# Patient Record
Sex: FEMALE | Race: BLACK OR AFRICAN AMERICAN | NOT HISPANIC OR LATINO | Employment: OTHER | URBAN - METROPOLITAN AREA
[De-identification: names, ages, dates, MRNs, and addresses within clinical notes are randomized per-mention and may not be internally consistent; named-entity substitution may affect disease eponyms.]

---

## 2017-01-13 ENCOUNTER — PATIENT OUTREACH (OUTPATIENT)
Dept: OTHER | Facility: OTHER | Age: 57
End: 2017-01-13
Payer: MEDICARE

## 2017-01-13 NOTE — PROGRESS NOTES
Last 5 Patient Entered Readings                                                               Current 30 Day Average: 120/69     Recent Readings 1/3/2017 1/3/2017 12/26/2016 12/26/2016 12/12/2016    Systolic BP (mmHg) 129 129 112 112 118    Diastolic BP (mmHg) 68 68 71 71 68    Pulse - 72 - 70 -          Follow up with Ms. Omid Rascon completed. Patient is maintaining a low sodium diet and continuing her exercise regime. She reports that overall, she is doing well and feeling ok. She is having a biopsy done on Feb 1 for a mass that is in her head and because of that, her MD took her off of the Amlodipine for the time being (her readings shot up in the office at her last visit so they took her off the meds and since then she has been fine). She is also having labs done and then seeing her kidney MD on Feb 9 to have a check up. She is in [place] right now and having trouble getting her cuff to connect to her phone (she has a cuff that stays in [place] and one in [place] because she lives in both cities) so I suggested that she change the batteries in her cuff because her bluetooth is on but informed me that it was taking   a while for her cuff to even turn on. She is going to try that but if it still does not work, then she will just have to wait until she gets home and take her readings on the cuff in [place]. Will continue to monitor. Patient did not have any further questions or concerns. I will follow up in a few weeks to see how she is doing and progressing.

## 2017-01-17 ENCOUNTER — PATIENT MESSAGE (OUTPATIENT)
Dept: FAMILY MEDICINE | Facility: CLINIC | Age: 57
End: 2017-01-17

## 2017-02-02 ENCOUNTER — PATIENT MESSAGE (OUTPATIENT)
Dept: NEPHROLOGY | Facility: CLINIC | Age: 57
End: 2017-02-02

## 2017-02-03 DIAGNOSIS — N18.2 CHRONIC KIDNEY DISEASE, STAGE II (MILD): Primary | ICD-10-CM

## 2017-02-13 ENCOUNTER — PATIENT OUTREACH (OUTPATIENT)
Dept: OTHER | Facility: OTHER | Age: 57
End: 2017-02-13
Payer: MEDICARE

## 2017-02-15 ENCOUNTER — PATIENT OUTREACH (OUTPATIENT)
Dept: OTHER | Facility: OTHER | Age: 57
End: 2017-02-15
Payer: MEDICARE

## 2017-02-15 NOTE — PROGRESS NOTES
Last 5 Patient Entered Readings                                                               Current 30 Day Average: 116/71     Recent Readings 2/8/2017 2/8/2017 2/8/2017 2/8/2017 2/8/2017    Systolic BP (mmHg) 120 120 112 114 121    Diastolic BP (mmHg) 78 78 77 71 73    Pulse 68 - 71 70 -        LVM to follow up with Ms. Omid Meeksgarcía Rascon. Inquired about how her low sodium diet and exercise is going. Overall, her readings are looking good and she is back taking her readings weekly again. Advised pt to call or message back if she has any questions or concerns.

## 2017-02-16 DIAGNOSIS — D47.2 MGUS (MONOCLONAL GAMMOPATHY OF UNKNOWN SIGNIFICANCE): Primary | ICD-10-CM

## 2017-02-17 NOTE — PROGRESS NOTES
Ms. Omid Rascon is doing well. She hasn't taken amlodipine since December. She is using herbal supplements instead (garlic and eggplant).    Last 5 Patient Entered Readings                                                               Current 30 Day Average: 115/70     Recent Readings 2/15/2017 2/15/2017 2/8/2017 2/8/2017 2/8/2017    Systolic BP (mmHg) 113 113 120 120 112    Diastolic BP (mmHg) 70 70 78 78 77    Pulse - 69 68 - 71      BP at goal  Continue monitoring

## 2017-02-23 ENCOUNTER — TELEPHONE (OUTPATIENT)
Dept: NEUROSURGERY | Facility: CLINIC | Age: 57
End: 2017-02-23

## 2017-02-23 NOTE — TELEPHONE ENCOUNTER
I contacted the pt at both listed numbers and LM asking the pt to give our office a call regarding her scheduled appt with . Should the pt call back this is a [facility] pt and will need to f/u with her or a PA

## 2017-02-24 ENCOUNTER — TELEPHONE (OUTPATIENT)
Dept: NEUROSURGERY | Facility: CLINIC | Age: 57
End: 2017-02-24

## 2017-02-24 NOTE — TELEPHONE ENCOUNTER
Advised patient appointment with Dr. Alan on Monday 2/27/17 cancelled as she is a previous patient of Dr. Keith's and needs to follow-up with her, not Dr. Alan. Appointment with  scheduled Thursday 3/9/17 at 3:15pm. Appointment slip mailed to address on file. Understanding verbalized.

## 2017-02-24 NOTE — TELEPHONE ENCOUNTER
----- Message from Mk Roche sent at 2/24/2017  8:42 AM CST -----  Contact: PT  Returning [place] call, [phone]

## 2017-02-27 ENCOUNTER — OFFICE VISIT (OUTPATIENT)
Dept: HEMATOLOGY/ONCOLOGY | Facility: CLINIC | Age: 57
End: 2017-02-27
Payer: MEDICARE

## 2017-02-27 ENCOUNTER — LAB VISIT (OUTPATIENT)
Dept: LAB | Facility: HOSPITAL | Age: 57
End: 2017-02-27
Attending: INTERNAL MEDICINE
Payer: MEDICARE

## 2017-02-27 ENCOUNTER — PATIENT MESSAGE (OUTPATIENT)
Dept: NEPHROLOGY | Facility: CLINIC | Age: 57
End: 2017-02-27

## 2017-02-27 DIAGNOSIS — N28.1 RENAL CYST: ICD-10-CM

## 2017-02-27 DIAGNOSIS — I10 ESSENTIAL HYPERTENSION: ICD-10-CM

## 2017-02-27 DIAGNOSIS — D47.2 MGUS (MONOCLONAL GAMMOPATHY OF UNKNOWN SIGNIFICANCE): Primary | ICD-10-CM

## 2017-02-27 DIAGNOSIS — N18.2 CKD (CHRONIC KIDNEY DISEASE), STAGE II: ICD-10-CM

## 2017-02-27 DIAGNOSIS — D47.2 MONOCLONAL GAMMOPATHY: ICD-10-CM

## 2017-02-27 LAB
ALBUMIN SERPL BCP-MCNC: 3.9 G/DL
ANION GAP SERPL CALC-SCNC: 9 MMOL/L
BUN SERPL-MCNC: 21 MG/DL
CALCIUM SERPL-MCNC: 9 MG/DL
CHLORIDE SERPL-SCNC: 106 MMOL/L
CO2 SERPL-SCNC: 25 MMOL/L
CREAT SERPL-MCNC: 1.1 MG/DL
EST. GFR  (AFRICAN AMERICAN): >60 ML/MIN/1.73 M^2
EST. GFR  (NON AFRICAN AMERICAN): 55.9 ML/MIN/1.73 M^2
GLUCOSE SERPL-MCNC: 93 MG/DL
PHOSPHATE SERPL-MCNC: 4 MG/DL
POTASSIUM SERPL-SCNC: 3.6 MMOL/L
SODIUM SERPL-SCNC: 140 MMOL/L

## 2017-02-27 PROCEDURE — 99499 UNLISTED E&M SERVICE: CPT | Mod: S$GLB,,, | Performed by: INTERNAL MEDICINE

## 2017-02-27 NOTE — PROGRESS NOTES
Patient not seen due incomplete requested labs and need to reschedule appt.  Appointment rescheduled for 3/6/17.

## 2017-03-09 ENCOUNTER — OFFICE VISIT (OUTPATIENT)
Dept: HEMATOLOGY/ONCOLOGY | Facility: CLINIC | Age: 57
End: 2017-03-09
Payer: MEDICARE

## 2017-03-09 VITALS
WEIGHT: 167.56 LBS | BODY MASS INDEX: 27.88 KG/M2 | OXYGEN SATURATION: 100 % | SYSTOLIC BLOOD PRESSURE: 195 MMHG | RESPIRATION RATE: 15 BRPM | DIASTOLIC BLOOD PRESSURE: 104 MMHG | HEART RATE: 75 BPM | TEMPERATURE: 98 F

## 2017-03-09 DIAGNOSIS — M54.89 MIDLINE BACK PAIN, UNSPECIFIED BACK LOCATION, UNSPECIFIED CHRONICITY: ICD-10-CM

## 2017-03-09 DIAGNOSIS — D58.2 HEMOGLOBIN C TRAIT: ICD-10-CM

## 2017-03-09 DIAGNOSIS — G93.89 BRAIN MASS: Primary | ICD-10-CM

## 2017-03-09 PROCEDURE — 99215 OFFICE O/P EST HI 40 MIN: CPT | Mod: S$GLB,,, | Performed by: INTERNAL MEDICINE

## 2017-03-09 PROCEDURE — 1160F RVW MEDS BY RX/DR IN RCRD: CPT | Mod: S$GLB,,, | Performed by: INTERNAL MEDICINE

## 2017-03-09 PROCEDURE — 3080F DIAST BP >= 90 MM HG: CPT | Mod: S$GLB,,, | Performed by: INTERNAL MEDICINE

## 2017-03-09 PROCEDURE — 99999 PR PBB SHADOW E&M-EST. PATIENT-LVL III: CPT | Mod: PBBFAC,,, | Performed by: INTERNAL MEDICINE

## 2017-03-09 PROCEDURE — 3077F SYST BP >= 140 MM HG: CPT | Mod: S$GLB,,, | Performed by: INTERNAL MEDICINE

## 2017-03-09 NOTE — PROGRESS NOTES
Subjective:       Patient ID: Omid Rascon is a 56 y.o. female.    Chief Complaint: No chief complaint on file.    HPI Comments: Mrs. Rascon is a 56 year old black female from [place] with history fo hypertension, hgb C trait, hearing impairment, CKD2 and history of back injury who returns for follow up regarding her history of a right frontal head ba first detected January 2016 and later associated with an MGUS that was detected 11/10/16. Patient was last seen in clinic 12/15/17. Given the possibility of plasmacytoma, which could be treated with focal radiation. In addition, patient had new right preauricularnon tender ~1 cm masses on palpation. So it was recommended she have repeat imaging and be re evaulated by neurosurgery for possible biopsy. Patient stated that she would be out of town for the holidays and that she would proceed with MRI brain but unless there was an emergent need to be seen, she would follow up in February 2017.   MRI of brain 12/23/17 showed the following:   Right frontal soft tissue lesion with transosseous extension to the adjacent frontal calvarium and persistent thick pachymeningeal enhancement overlying the right frontal lobe and extending along the interhemispheric fissure.  When compared to the MRI dated 03/21/2016, the enhancing component along the interhemispheric fissure has decreased in conspicuity with the remainder of the lesion unchanged. Nonenhancing well-circumscribed nodular lesions within the right preauricular region, nonspecific but concerning for pathologic lymph nodes.  Patient had blood work 2/27/17 which showed non monoclonal gammopathy. Stable CBC and stable kidney function.   She denies headaches or changes in vision. Has chronic back pain that has recently been exacerbated. Denies neurological deficits. Pain is causing great distress.  She denies any history of fractures or hypercalcemia. She denies fevers, chills, night sweats, weight loss. She is  very tearful about her blood pressure is not well controlled. She has an appointment with neurosurgery later today.       Remote History:  Mrs. Rascon was evaluated by Dr. Keith of neurosurgery in January 2016 for a right scalp mass. She had imaging studies 1/28/16 that included head CT and brain MRI. Right frontal T1 hypointense calvarial lesion with prominence of the overlying soft tissues. Additionally, there is a mildly enhancing dural lesion along the anterior falx without evidence for intra-axial enhancement or parenchymal lesion. Lucencies in the right frontal calvarium and hyperattenuating lesion along the anterior cerebral falx correspond to lesions seen on MRI.   She was referred to Dr. Ken for oncologic work up. CT of neck chest abdomen and pelvis were negative for malignancy in February 2016. Patient had repeat brain and and calvarium imaging in March of 2016 prior to anticipated biopsy. Lesions appeared unchanged.   Surgery for biopsy was postponed due to patient's uncontrolled blood pressure.   She has been seen by nephrology, Dr. Rosa to address her refractory hypertension as well as her early stage CKD. Serum free light cahin, SPEP and KEVIN drawn 11/10/16. FLC showed mildly elevated kappa = 2.29 with babak ratio. SPEP was normal but KVEIN detected an IgG lambda specific monoclonal protein at the beta/gamma junction. This had been seen on KEVIN 5/16/16 as well.   She reports longstanding history of anemia she attributes to Hgb C trait. She has received two blood transfusions in the distant past. She has a history of heavy menstrual bleeding which required a uterine lining ablation.        Review of Systems   Constitutional: Negative for chills, diaphoresis, fatigue and fever.   HENT: Negative for congestion and trouble swallowing.    Respiratory: Negative for cough and shortness of breath.    Cardiovascular: Negative for chest pain and leg swelling.   Gastrointestinal: Negative for abdominal  distention and abdominal pain.   Endocrine: Negative for polyphagia and polyuria.   Genitourinary: Negative for dysuria and hematuria.   Musculoskeletal: Positive for back pain. Negative for gait problem.   Skin: Negative for color change and pallor.   Neurological: Negative for seizures and headaches.   Hematological: Negative for adenopathy. Does not bruise/bleed easily.   Psychiatric/Behavioral: Positive for dysphoric mood.       Objective:      Physical Exam   Constitutional: She is oriented to person, place, and time. She appears well-developed and well-nourished.   HENT:   Mouth/Throat: Oropharynx is clear and moist. No oropharyngeal exudate.   Eyes: Conjunctivae are normal. Pupils are equal, round, and reactive to light. No scleral icterus.   Neck: Normal range of motion.   Cardiovascular: Normal rate and regular rhythm.    Pulmonary/Chest: Effort normal and breath sounds normal.   Abdominal: Soft. Bowel sounds are normal.   Lymphadenopathy:     She has no cervical adenopathy.   Neurological: She is alert and oriented to person, place, and time. No cranial nerve deficit.   Skin: Skin is warm and dry.   Psychiatric: Her behavior is normal.   tearful       Assessment:       1. Brain mass    2. Midline back pain, unspecified back location, unspecified chronicity    3. Hemoglobin C trait        Plan:   Mrs. Rascon has a mass in her right frontal scalp that extends through the calvarium. She did have a monoclonal gammopathy of undetermined significance detected in November 2016. Given this clinical picture, it mass was highly suspicious for a plasmacytoma. MGUS however is no longer detected on most recent labs. Mass is still palpable but has not grown and perhaps may have shrunk in size by estimation. She continues to have preauricular nodules but they have not grown either.   We would continue to recommend a biopsy rather than a resection. If plasmacytoma noted, can offer focal radiation.   Will recommend spine  MRI given significant back pain located in cervical, thoracic, and lumbar spine. Will follow up study. If normal and no biopsy of mass is planned per neurosurgery, will follow up in about 4 months or sooner as need.  Case discussed with Dr. Chand.  Call questions answered to satisfaction.     Rocio Harrell MD   Pager [phone]    Correction MRI brain 12/23/16 not 12/23/17

## 2017-03-10 ENCOUNTER — OFFICE VISIT (OUTPATIENT)
Dept: NEUROSURGERY | Facility: CLINIC | Age: 57
End: 2017-03-10
Payer: MEDICARE

## 2017-03-10 VITALS
BODY MASS INDEX: 29 KG/M2 | SYSTOLIC BLOOD PRESSURE: 185 MMHG | WEIGHT: 169.88 LBS | HEIGHT: 64 IN | HEART RATE: 78 BPM | DIASTOLIC BLOOD PRESSURE: 109 MMHG

## 2017-03-10 DIAGNOSIS — M89.9 FRONTAL SKULL LESION: Primary | ICD-10-CM

## 2017-03-10 PROCEDURE — 99999 PR PBB SHADOW E&M-EST. PATIENT-LVL III: CPT | Mod: PBBFAC,,, | Performed by: NEUROLOGICAL SURGERY

## 2017-03-10 PROCEDURE — 3080F DIAST BP >= 90 MM HG: CPT | Mod: S$GLB,,, | Performed by: NEUROLOGICAL SURGERY

## 2017-03-10 PROCEDURE — 99214 OFFICE O/P EST MOD 30 MIN: CPT | Mod: S$GLB,,, | Performed by: NEUROLOGICAL SURGERY

## 2017-03-10 PROCEDURE — 1160F RVW MEDS BY RX/DR IN RCRD: CPT | Mod: S$GLB,,, | Performed by: NEUROLOGICAL SURGERY

## 2017-03-10 PROCEDURE — 3077F SYST BP >= 140 MM HG: CPT | Mod: S$GLB,,, | Performed by: NEUROLOGICAL SURGERY

## 2017-03-15 ENCOUNTER — PATIENT OUTREACH (OUTPATIENT)
Dept: OTHER | Facility: OTHER | Age: 57
End: 2017-03-15
Payer: MEDICARE

## 2017-03-15 ENCOUNTER — OFFICE VISIT (OUTPATIENT)
Dept: NEPHROLOGY | Facility: CLINIC | Age: 57
End: 2017-03-15
Payer: MEDICARE

## 2017-03-15 VITALS
OXYGEN SATURATION: 99 % | DIASTOLIC BLOOD PRESSURE: 110 MMHG | SYSTOLIC BLOOD PRESSURE: 170 MMHG | WEIGHT: 167.13 LBS | HEART RATE: 89 BPM | BODY MASS INDEX: 28.53 KG/M2 | HEIGHT: 64 IN

## 2017-03-15 DIAGNOSIS — D47.2 MONOCLONAL GAMMOPATHY: ICD-10-CM

## 2017-03-15 DIAGNOSIS — N18.2 CKD (CHRONIC KIDNEY DISEASE), STAGE II: Primary | Chronic | ICD-10-CM

## 2017-03-15 DIAGNOSIS — N28.1 RENAL CYST: ICD-10-CM

## 2017-03-15 DIAGNOSIS — I10 ESSENTIAL HYPERTENSION: Chronic | ICD-10-CM

## 2017-03-15 PROCEDURE — 99213 OFFICE O/P EST LOW 20 MIN: CPT | Mod: S$GLB,,, | Performed by: INTERNAL MEDICINE

## 2017-03-15 PROCEDURE — 99999 PR PBB SHADOW E&M-EST. PATIENT-LVL III: CPT | Mod: PBBFAC,,, | Performed by: INTERNAL MEDICINE

## 2017-03-15 PROCEDURE — 3077F SYST BP >= 140 MM HG: CPT | Mod: S$GLB,,, | Performed by: INTERNAL MEDICINE

## 2017-03-15 PROCEDURE — 99499 UNLISTED E&M SERVICE: CPT | Mod: S$GLB,,, | Performed by: INTERNAL MEDICINE

## 2017-03-15 PROCEDURE — 1160F RVW MEDS BY RX/DR IN RCRD: CPT | Mod: S$GLB,,, | Performed by: INTERNAL MEDICINE

## 2017-03-15 PROCEDURE — 3080F DIAST BP >= 90 MM HG: CPT | Mod: S$GLB,,, | Performed by: INTERNAL MEDICINE

## 2017-03-15 NOTE — PROGRESS NOTES
Subjective:       Patient ID: Omid Rascon is a 56 y.o. Black or  female who presents for a follow up evaluation of uncontrolled HTN  The patient has a history of HTN possibly 2013, depression since lower back injury. She was recently diagnosed with a frontal skull lesion and in that context it was found that she has difficult to control BP. She can not take lisinopril/amlodipine because of vague side effects and has a history of non-adherence with her medication. Supposedly she gets a rash from every pill she ever took except from norvasc. She is convinced that a variety of her antihypertensive medication is causing her to have high blood pressure rather than lowering it. She is also convinced that her lisinopril caused her frontal head tumor. Allergic to sulfur drugs. She is still very upset about her high BP and thinks it is caused by Lead exposure in the past and there is no way to talk that out of her. She states to use a low salt diet.  The norvasc disappeared from her chart.   The patient tells me that her blood pressure at home runs in the 130's systolic.     HPI  Review of Systems   Constitutional: Negative for activity change, appetite change, chills, fatigue, fever and unexpected weight change.   HENT: Negative.  Negative for nosebleeds.    Eyes: Negative.    Respiratory: Negative.  Negative for cough, chest tightness and shortness of breath.    Cardiovascular: Negative.  Negative for chest pain and leg swelling.   Gastrointestinal: Negative for abdominal pain, anal bleeding, diarrhea, nausea and vomiting.   Genitourinary: Negative for decreased urine volume, difficulty urinating, dysuria, flank pain, frequency, hematuria, pelvic pain, urgency and vaginal bleeding.   Musculoskeletal: Negative.  Negative for joint swelling and myalgias.   Skin: Negative.  Negative for rash.   Neurological: Negative.    Psychiatric/Behavioral: Negative.    All other systems reviewed and are  negative.      Objective:      Physical Exam   Constitutional: She is oriented to person, place, and time. She appears well-developed and well-nourished. No distress.   HENT:   Head: Normocephalic and atraumatic.   Right Ear: External ear normal.   Left Ear: External ear normal.   Nose: Nose normal.   Mouth/Throat: Oropharynx is clear and moist.   Eyes: Conjunctivae and EOM are normal. Pupils are equal, round, and reactive to light.   Neck: Normal range of motion. Neck supple. No thyromegaly present.   Cardiovascular: Normal rate, regular rhythm and intact distal pulses.    Murmur ( distant systolic flow murmur) heard.  Pulmonary/Chest: Effort normal and breath sounds normal. No respiratory distress. She has no wheezes. She has no rales. She exhibits no tenderness.   Abdominal: Soft. Normal appearance and bowel sounds are normal. She exhibits no distension. There is no tenderness. There is no rebound and no guarding.   Musculoskeletal: Normal range of motion. She exhibits no edema or tenderness.   Neurological: She is alert and oriented to person, place, and time. She has normal reflexes.   Skin: Skin is warm, dry and intact. She is not diaphoretic.   Psychiatric: She has a normal mood and affect. Her behavior is normal. Judgment and thought content normal.   Nursing note and vitals reviewed.      Assessment:       1. CKD (chronic kidney disease), stage II    2. Renal cyst    3. Monoclonal gammopathy    4. Essential hypertension        Plan:       1. CKD2: Recently increased creatinine from 0.9 (basline) to 1.1. Not clear why and if this was when she was on.  Her renal ultrasound is not normal as it demonstrated signs of chronic kidney disease and a right sided cyst with septation.   In her routine work up a faint IgG lambda specific monoclonal band is present.  - repeat renal US for her complex cyst.           Lab Results   Component Value Date    CREATININE 1.1 02/27/2017     Protein Creatinine  Ratios:    Prot/Creat Ratio, Ur   Date Value Ref Range Status   02/27/2017 0.09 0.00 - 0.20 Final   10/24/2016 0.11 0.00 - 0.20 Final   06/23/2016 0.25 (H) 0.00 - 0.20 Final     ·   ·   Acid-Base:   Lab Results   Component Value Date     02/27/2017    K 3.6 02/27/2017    CO2 25 02/27/2017     2. HTN: Blood pressures are controlled at home as she has ambulatory blood pressure monitoring with Mrs [facility].  She could have a form of white coat hypertension or stress induced hypertension.  Continue relaxation techniques and follow up with ambulatory blood pressure measuring.    She does not want any more blood pressure medication.   She will call me back if she needs the Amlodipine!       Is not taking the medication below because of multiple adverse reactions  Amlodipine/HCTZ - swelling and rash??  Lisinopril and Benicar - rah all over the body  Spironolactone - lost her voice              The patient was educated in practicing a low salt diet.  Avoid high salt foods (olives, pickles, smoked meats, salted potato chips, etc.).   Do not add salt to your food at the table.   Use only small amounts of salt when cooking.     3. Frontal scalp mass: was seen by hem/onc and they repeatedly recommended a biopsy and also an spine MRI given significant back pain located in cervical, thoracic, and lumbar spine.     Follow up with primary care and hem/onc.

## 2017-03-15 NOTE — PATIENT INSTRUCTIONS
Please see me back in my clinic in 6 month with blood work.   Please get ultrasound in [place] to follow up on your cysts.

## 2017-03-15 NOTE — PROGRESS NOTES
Last 5 Patient Entered Readings                                                               Current 30 Day Average: 113/70     Recent Readings 3/1/2017 3/1/2017 2/15/2017 2/15/2017 2/8/2017    Systolic BP (mmHg) 113 113 113 113 120    Diastolic BP (mmHg) 71 71 70 70 78    Pulse - 70 - 69 68        LVM to follow up with Mrs. Omid Rascon. Inquired about how her low sodium diet and exercise is going. Overall, readings are looking good but reminded her that a requirement of the program is for her to take readings at least once per week if not more. Requested that she get a few readings in this week so we can see how her BP readings are running. Advised pt to call or message back if she has any questions or concerns.    Patient called back and informed me that her phone was broken but finally got a new one and will start taking readings a few times per week. She did have some high readings in the MD office this past week so I want her to take some readings so we can see how she is doing. She has been in some high stress situations with medical issues etc so I advised her to take some time to relax and mediate. I will send her some meditation resources ([email or web address]).

## 2017-03-15 NOTE — MR AVS SNAPSHOT
[facility]  [address]  [facility] [address]  Phone: [phone]  Fax: [phone]                  Omid Rascon   3/15/2017 4:30 PM   Office Visit    Description:  Female : 1960   Provider:  Dereck Rosa MD   Department:  [facility]           Diagnoses this Visit        Comments    CKD (chronic kidney disease), stage II    -  Primary     Renal cyst         Monoclonal gammopathy         Essential hypertension                To Do List           Future Appointments        Provider Department Dept Phone    3/16/2017 9:00 AM Ti Hendrickson MD [facility] [phone]      Goals (5 Years of Data)              Today    Today    3/10/17    Blood Pressure < 140/90   170/110    170/110    Blood Pressure <= 140/90   170/110  170/110  170/110    Notes - Note created  2016 12:55 PM by Janelle Guillen, PharmD    It is important to consistently maintain a controlled blood pressure.      Exercise at least 150 minutes per week.           Take at least one BP reading per week at various times of the day           Notes - Note created  3/15/2017  9:54 AM by Sabina Kessler    Make sure to take at least one BP reading per week.        [facility]     [facility] -  Assistance  Registered nurses in the [facility] provide clinical advisement, health education, appointment booking, and other advisory services.  Call for this free service at [phone].             Medications           Message regarding Medications     Verify the changes and/or additions to your medication regime listed below are the same as discussed with your clinician today.  If any of these changes or additions are incorrect, please notify your healthcare provider.             Verify that the below list of medications is an accurate representation of the medications you are currently taking.  If none reported, the list may be blank. If  "incorrect, please contact your healthcare provider. Carry this list with you in case of emergency.           Current Medications     cetirizine (ZYRTEC) 10 MG tablet Take 1 tablet (10 mg total) by mouth daily as needed for Allergies or Rhinitis.           Clinical Reference Information           Your Vitals Were     BP Pulse Height Weight SpO2 BMI    170/110 89 5' 4" (1.626 m) 75.8 kg (167 lb 1.7 oz) 99% 28.68 kg/m2      Blood Pressure          Most Recent Value    BP  (!)  170/110      Allergies as of 3/15/2017     Lactose    Sulfa (Sulfonamide Antibiotics)    Latex, Natural Rubber    Shellfish Containing Products    Strawberries [Strawberry]      Immunizations Administered on Date of Encounter - 3/15/2017     None      Orders Placed During Today's Visit     Future Labs/Procedures Expected by Expires    Aldosterone  3/15/2017 5/14/2018    Renal function panel  3/15/2017 5/14/2018    Renin  3/15/2017 5/14/2018    Sodium, urine, random  3/15/2017 3/17/2017    Urinalysis  3/15/2017 3/15/2018    US Retroperitoneal Complete (Kidney and  3/15/2017 3/15/2018      Instructions    Please see me back in my clinic in 6 month with blood work.   Please get ultrasound in [place] to follow up on your cysts.       Language Assistance Services     ATTENTION: Language assistance services are available, free of charge. Please call [phone].      ATENCIÓN: Si habla brownañol, tiene a horne disposición servicios gratuitos de asistencia lingüística. Llame al [phone].     CHÚ Ý: N?u b?n nói Ti?ng Vi?t, có các d?ch v? h? tr? ngôn ng? mi?n phí dành cho b?n. G?i s? [phone].         [facility] complies with applicable Federal civil rights laws and does not discriminate on the basis of race, color, national origin, age, disability, or sex.        "

## 2017-03-16 ENCOUNTER — OFFICE VISIT (OUTPATIENT)
Dept: FAMILY MEDICINE | Facility: CLINIC | Age: 57
End: 2017-03-16
Payer: MEDICARE

## 2017-03-16 ENCOUNTER — LAB VISIT (OUTPATIENT)
Dept: LAB | Facility: HOSPITAL | Age: 57
End: 2017-03-16
Attending: INTERNAL MEDICINE
Payer: MEDICARE

## 2017-03-16 VITALS
BODY MASS INDEX: 28.64 KG/M2 | SYSTOLIC BLOOD PRESSURE: 120 MMHG | OXYGEN SATURATION: 98 % | HEART RATE: 78 BPM | DIASTOLIC BLOOD PRESSURE: 77 MMHG | WEIGHT: 167.75 LBS | HEIGHT: 64 IN | TEMPERATURE: 98 F

## 2017-03-16 DIAGNOSIS — H91.93 BILATERAL HEARING LOSS, UNSPECIFIED HEARING LOSS TYPE: ICD-10-CM

## 2017-03-16 DIAGNOSIS — M54.50 CHRONIC BILATERAL LOW BACK PAIN WITHOUT SCIATICA: ICD-10-CM

## 2017-03-16 DIAGNOSIS — I77.1 TORTUOUS AORTA: Chronic | ICD-10-CM

## 2017-03-16 DIAGNOSIS — I10 ESSENTIAL HYPERTENSION: Chronic | ICD-10-CM

## 2017-03-16 DIAGNOSIS — N18.2 CKD (CHRONIC KIDNEY DISEASE), STAGE II: Chronic | ICD-10-CM

## 2017-03-16 DIAGNOSIS — G89.29 CHRONIC BILATERAL LOW BACK PAIN WITHOUT SCIATICA: ICD-10-CM

## 2017-03-16 DIAGNOSIS — D47.2 MONOCLONAL GAMMOPATHY: ICD-10-CM

## 2017-03-16 DIAGNOSIS — N28.1 RENAL CYST: ICD-10-CM

## 2017-03-16 DIAGNOSIS — I10 WHITE COAT SYNDROME WITH HYPERTENSION: ICD-10-CM

## 2017-03-16 LAB
BILIRUB UR QL STRIP: NEGATIVE
CLARITY UR REFRACT.AUTO: CLEAR
COLOR UR AUTO: ABNORMAL
GLUCOSE UR QL STRIP: NEGATIVE
HGB UR QL STRIP: ABNORMAL
KETONES UR QL STRIP: NEGATIVE
LEUKOCYTE ESTERASE UR QL STRIP: NEGATIVE
MICROSCOPIC COMMENT: NORMAL
NITRITE UR QL STRIP: NEGATIVE
PH UR STRIP: 6 [PH] (ref 5–8)
PROT UR QL STRIP: NEGATIVE
RBC #/AREA URNS AUTO: 2 /HPF (ref 0–4)
SODIUM UR-SCNC: 111 MMOL/L
SP GR UR STRIP: 1.01 (ref 1–1.03)
SQUAMOUS #/AREA URNS AUTO: 0 /HPF
URN SPEC COLLECT METH UR: ABNORMAL
UROBILINOGEN UR STRIP-ACNC: NEGATIVE EU/DL
WBC #/AREA URNS AUTO: 0 /HPF (ref 0–5)

## 2017-03-16 PROCEDURE — 3074F SYST BP LT 130 MM HG: CPT | Mod: S$GLB,,, | Performed by: INTERNAL MEDICINE

## 2017-03-16 PROCEDURE — 3078F DIAST BP <80 MM HG: CPT | Mod: S$GLB,,, | Performed by: INTERNAL MEDICINE

## 2017-03-16 PROCEDURE — 81001 URINALYSIS AUTO W/SCOPE: CPT

## 2017-03-16 PROCEDURE — 1160F RVW MEDS BY RX/DR IN RCRD: CPT | Mod: S$GLB,,, | Performed by: INTERNAL MEDICINE

## 2017-03-16 PROCEDURE — 84300 ASSAY OF URINE SODIUM: CPT

## 2017-03-16 PROCEDURE — 99499 UNLISTED E&M SERVICE: CPT | Mod: S$GLB,,, | Performed by: INTERNAL MEDICINE

## 2017-03-16 PROCEDURE — 99214 OFFICE O/P EST MOD 30 MIN: CPT | Mod: S$GLB,,, | Performed by: INTERNAL MEDICINE

## 2017-03-16 PROCEDURE — 99999 PR PBB SHADOW E&M-EST. PATIENT-LVL III: CPT | Mod: PBBFAC,,, | Performed by: INTERNAL MEDICINE

## 2017-03-16 RX ORDER — MELOXICAM 15 MG/1
15 TABLET ORAL DAILY PRN
Qty: 90 TABLET | Refills: 0 | Status: SHIPPED | OUTPATIENT
Start: 2017-03-16 | End: 2019-01-07 | Stop reason: SDUPTHER

## 2017-03-16 NOTE — PROGRESS NOTES
HISTORY OF PRESENT ILLNESS:  Ms. Rascon is a 56-year-old female who is seeing me   today in followup.  Her last Neurosurgery Clinic appointment was on 03/07/2016.    In brief, at that time, she complained of a several-month history of a right   frontal skull lesion that she noticed after Thanksgiving of 2015 just below her   hairline.  She thought that it was steadily increasing in size.  She complained   of pain over the right side of her face in the V1 distribution.  Imaging studies   showed lucencies in the right frontal calvarium extending to the inner table of   the skull.  An MRI with and without contrast of the brain showed the right   frontal skull lesion, but there was also some expansion and enhancement of the   anterior falx measuring approximately 2.2 x 0.6 cm without any surrounding mass   effect or edema.  I had sent the patient to Hematology Oncology for metastatic   workup.  After all imaging studies of the chest, abdomen and pelvis came back as   negative, the patient was sent back to me for biopsy.  I had scheduled the   patient several times for biopsy, but she was canceled due to medical reasons.    She was rereferred back to me by Hematology/Oncology and she is here today to   see me in followup.  Currently, the patient feels well.  She denies headaches.    She denies any further numbness in her face.  She actually thinks that the right   frontal skull lesion has gotten smaller.  She has been worked up for a   plasmacytoma, but these results are inconclusive.  She was referred back to me   for reconsideration of a biopsy.    PHYSICAL EXAMINATION:  She remains neurologically intact.  She is awake, alert   and oriented to person, place and time.  Her pupils are equal, round and   reactive to light.  Extraocular movements are intact.  Face is symmetric.    Tongue midline.  Facial sensation intact to light touch bilaterally throughout   all distributions.  She has no pronator drift.  She has 5/5  "motor strength   throughout bilateral upper and lower extremities.  She has no dysmetria.    She has an updated MRI with and without contrast of the brain available for   review.  This again identifies the right frontal soft tissue and skull lesion.    When compared to the previous MRI from March 2016, the skull portion of the   lesion is relatively stable in size perhaps somewhat and smaller; however, the   intracranial component of the enhancing falx portion has significantly decreased   in size.  I personally reviewed this MRI of the brain.    IMPRESSION AND PLAN:  Ms. Rascon is a 56-year-old female with a known right   frontal skull lesion.  She was referred back to me by Hematology/Oncology for   biopsy.  I described a craniotomy for biopsy in detail to the patient.  I   explained the risks and benefits and pros and cons.  The patient is adamantly   opposed to a craniotomy.  Depending on the results of the biopsy,   Hematology/Oncology would consider focal radiation.  I explained this to the   patient; however, she states "when it is my time, it is my time", she is   interested in a needle biopsy; however, I do not perform needle biopsies of the   skull.  I will refer her back to Hematology/Oncology for consideration for   needle biopsy.  At this point, if the patient is unwilling to proceed with   craniotomy, there is no role for neurosurgical intervention or followup at this   time.  If the patient changes her mind, she knows she can call my office at any   point.  Otherwise, I will be in touch with the Hematology Oncology team for   coordination of this patient's care.      ANY/NINA  dd: 03/16/2017 08:41:17 (CDT)  td: 03/16/2017 19:33:01 (CDT)  Doc ID   #[id number]  Job ID #[id number]    CC:       Dictation #: [id number]  "

## 2017-03-16 NOTE — ASSESSMENT & PLAN NOTE
Will restart meloxicam PRN for severe symptoms.  Counseled on avoiding daily use due to renal function.  Low back home exercises also provided for the patient.

## 2017-03-16 NOTE — ASSESSMENT & PLAN NOTE
Patient with persistently elevated blood pressures during OV's.  She has been enrolled in the digital hypertension program which has shown excellent average BPs at home.  Will hold off of medications at this time and continue to work on diet control

## 2017-03-16 NOTE — PROGRESS NOTES
Chief Complaint  Chief Complaint   Patient presents with    Hypertension     f/u       HPI  Omid Rascon is a 56 y.o. female with medical diagnoses as listed in the medical history and problem list that presents for HTN follow up.  Pt is known to me with her last appointment 11/10/2016.  At the last visit she was still deciding on a PCP but has elected to be followed by me.  She continues to have beliefs regarding medications causing elevated blood pressure and some beliefs regarding lead exposure causing elevated blood pressures that multiple providers have been unsuccessful counseling her on.  She is being followed by nephrology and Heme/Onc and I have reviewed their most recent notes from earlier this month.  It appears that she may have been seen by neurosurgery regarding the frontal mass and preauricular lesions that Heme/onc felt needed to be biopsied but I cannot immediately see the neurosurgery note in Epic today???  She is having some low back pain which has been present for many years.  Was using meloxicam for severe symptoms.  Previously went to PT with some good results.        PAST MEDICAL HISTORY:  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       PAST SURGICAL HISTORY:  History reviewed. No pertinent surgical history.    SOCIAL HISTORY:  Social History     Social History    Marital status:      Spouse name: N/A    Number of children: N/A    Years of education: N/A     Occupational History    Not on file.     Social History Main Topics    Smoking status: Never Smoker    Smokeless tobacco: Never Used    Alcohol use No    Drug use: No    Sexual activity: Yes     Partners: Male     Other Topics Concern    Not on file     Social History Narrative    No  narrative on file       FAMILY HISTORY:  Family History   Problem Relation Age of Onset    Stroke Father     Heart failure Mother     No Known Problems Sister     No Known Problems Brother     No Known Problems Brother     No Known Problems Brother     No Known Problems Sister     No Known Problems Sister     No Known Problems Sister     No Known Problems Brother     No Known Problems Sister     Diabetes Sister     No Known Problems Maternal Aunt     No Known Problems Maternal Uncle     No Known Problems Paternal Aunt     No Known Problems Paternal Uncle     No Known Problems Maternal Grandmother     No Known Problems Maternal Grandfather     No Known Problems Paternal Grandmother     No Known Problems Paternal Grandfather     Amblyopia Neg Hx     Blindness Neg Hx     Cancer Neg Hx     Cataracts Neg Hx     Glaucoma Neg Hx     Hypertension Neg Hx     Macular degeneration Neg Hx     Retinal detachment Neg Hx     Strabismus Neg Hx     Thyroid disease Neg Hx        ALLERGIES AND MEDICATIONS: updated and reviewed.  Review of patient's allergies indicates:   Allergen Reactions    Lactose     Sulfa (sulfonamide antibiotics) Swelling    Latex, natural rubber Rash    Shellfish containing products Rash    Strawberries [strawberry] Rash     Current Outpatient Prescriptions   Medication Sig Dispense Refill    cetirizine (ZYRTEC) 10 MG tablet Take 1 tablet (10 mg total) by mouth daily as needed for Allergies or Rhinitis. 30 tablet 11    meloxicam (MOBIC) 15 MG tablet Take 1 tablet (15 mg total) by mouth daily as needed (low back pain). Change to daily as needed for pain if possible 90 tablet 0     No current facility-administered medications for this visit.          ROS  Review of Systems   Constitutional: Negative for chills, fever and unexpected weight change.   HENT: Positive for hearing loss. Negative for congestion, ear pain, rhinorrhea, sore throat and trouble swallowing.    Eyes:  "Negative for discharge, redness and visual disturbance.   Respiratory: Negative for cough, chest tightness, shortness of breath and wheezing.    Cardiovascular: Negative for chest pain, palpitations and leg swelling.   Gastrointestinal: Negative for abdominal pain, constipation, diarrhea, nausea and vomiting.   Endocrine: Negative for polydipsia, polyphagia and polyuria.   Genitourinary: Negative for decreased urine volume, dysuria and hematuria.   Musculoskeletal: Positive for back pain. Negative for arthralgias, joint swelling and myalgias.   Skin: Negative for color change and rash.   Neurological: Negative for dizziness, weakness, light-headedness and headaches.   Psychiatric/Behavioral: Negative for decreased concentration, dysphoric mood, sleep disturbance and suicidal ideas.           Physical Exam  Vitals:    03/16/17 0848 03/16/17 0856   BP: (!) 178/118 120/77   BP Location: Left arm    Patient Position: Sitting    BP Method: Manual    Pulse: 78    Temp: 97.7 °F (36.5 °C)    TempSrc: Oral    SpO2: 98%    Weight: 76.1 kg (167 lb 12.3 oz)    Height: 5' 4" (1.626 m)     Body mass index is 28.8 kg/(m^2).  Weight: 76.1 kg (167 lb 12.3 oz)   Height: 5' 4" (162.6 cm)   General appearance: alert, appears stated age, cooperative and no distress  Head: Normocephalic, without obvious abnormality, atraumatic  Eyes: PERRL EOMI conjunctiva clear  Neck: no adenopathy, no carotid bruit, no JVD, supple, symmetrical, trachea midline and thyroid not enlarged, symmetric, no tenderness/mass/nodules  Back: no tenderness to percussion or palpation, SLR negative bilaterally  Lungs: clear to auscultation bilaterally  Heart: regular rate and rhythm, S1, S2 normal, no murmur, click, rub or gallop  Abdomen: soft, non-tender; bowel sounds normal; no masses,  no organomegaly  Extremities: extremities normal, atraumatic, no cyanosis or edema  Pulses: 2+ and symmetric  Neurologic: Mental status: Alert, oriented, thought content " appropriate  Sensory: normal  Motor:grossly normal  Coordination: normal  Gait: Normal   Symmetric facial movements palate elevated symmetrically tongue midline       Health Maintenance       Date Due Completion Date    Pneumococcal PCV13 (High Risk) (1 - PCV13 Required) 4/30/1960 ---    TETANUS VACCINE 4/30/1977 ---    Pneumococcal PPSV23 (High Risk) (1) 4/30/1977 ---    Colonoscopy 4/30/2009 ---    Influenza Vaccine 8/1/2016 1/14/2016 (Declined)    Override on 1/14/2016: Declined    Override on 3/28/2014: Not Clinically Appropriate    Pap Smear 3/10/2018 3/10/2015    Mammogram 5/26/2018 5/26/2016    Lipid Panel 3/29/2019 3/29/2014            Assessment & Plan  Problem List Items Addressed This Visit        Neuro    Chronic bilateral low back pain without sciatica (Chronic)    Current Assessment & Plan     Will restart meloxicam PRN for severe symptoms.  Counseled on avoiding daily use due to renal function.  Low back home exercises also provided for the patient.          Relevant Medications    meloxicam (MOBIC) 15 MG tablet    Bilateral hearing loss (Chronic)    Current Assessment & Plan     Reports she was told she needed a cochlear implant.  Unchanged.  Monitor.             Cardiac    White coat syndrome with hypertension (Chronic)    Current Assessment & Plan     Patient with persistently elevated blood pressures during OV's.  She has been enrolled in the digital hypertension program which has shown excellent average BPs at home.  Will hold off of medications at this time and continue to work on diet control         Relevant Orders    Lipid panel    Tortuous aorta (Chronic)    Overview     Stable, asymptomatic chronic condition.  Will continue to maximize risk factor reduction and adjust medication as needed.                  Health Maintenance reviewed, will add FLP to lab draw pt is having done for renal.    Follow-up: Return for Routine Physical Nov 2017.

## 2017-03-16 NOTE — MR AVS SNAPSHOT
[facility]  [address]  [place] [address]  Phone: [phone]  Fax: [phone]                  Omid Rsacon   3/16/2017 9:00 AM   Office Visit    Description:  Female : 1960   Provider:  Ti Hendrickson MD   [facility]  [facility]           Reason for Visit     Hypertension           Diagnoses this Visit        Comments    White coat syndrome with hypertension         Tortuous aorta         Chronic bilateral low back pain without sciatica         Bilateral hearing loss, unspecified hearing loss type                To Do List           Goals (5 Years of Data)              Today    Today    3/15/17    Blood Pressure < 140/90   120/77  120/77  120/77    Blood Pressure <= 140/90   120/77  120/77  120/77    Notes - Note created  2016 12:55 PM by Janelle Guillen, PharmD    It is important to consistently maintain a controlled blood pressure.      Exercise at least 150 minutes per week.           Take at least one BP reading per week at various times of the day           Notes - Note created  3/15/2017  9:54 AM by Sabina Kessler    Make sure to take at least one BP reading per week.        Follow-Up and Disposition     Return for Routine Physical 2017.       These Medications        Disp Refills Start End    meloxicam (MOBIC) 15 MG tablet 90 tablet 0 3/16/2017     Take 1 tablet (15 mg total) by mouth daily as needed (low back pain). Change to daily as needed for pain if possible - Oral    Pharmacy: [facility] [address] [place] [address] [address] AT [facility] #: [phone]         [facility]     [facility] On Call Nurse Care Line -  Assistance  Registered nurses in the [facility] On Call Center provide clinical advisement, health education, appointment booking, and other advisory services.  Call for this free service at [phone].             Medications           Message regarding Medications   "   Verify the changes and/or additions to your medication regime listed below are the same as discussed with your clinician today.  If any of these changes or additions are incorrect, please notify your healthcare provider.        START taking these NEW medications        Refills    meloxicam (MOBIC) 15 MG tablet 0    Sig: Take 1 tablet (15 mg total) by mouth daily as needed (low back pain). Change to daily as needed for pain if possible    Class: Normal    Route: Oral           Verify that the below list of medications is an accurate representation of the medications you are currently taking.  If none reported, the list may be blank. If incorrect, please contact your healthcare provider. Carry this list with you in case of emergency.           Current Medications     cetirizine (ZYRTEC) 10 MG tablet Take 1 tablet (10 mg total) by mouth daily as needed for Allergies or Rhinitis.    meloxicam (MOBIC) 15 MG tablet Take 1 tablet (15 mg total) by mouth daily as needed (low back pain). Change to daily as needed for pain if possible           Clinical Reference Information           Your Vitals Were     BP Pulse Temp Height Weight SpO2    120/77 78 97.7 °F (36.5 °C) (Oral) 5' 4" (1.626 m) 76.1 kg (167 lb 12.3 oz) 98%    BMI                28.8 kg/m2          Blood Pressure          Most Recent Value    BP  120/77      Allergies as of 3/16/2017     Lactose    Sulfa (Sulfonamide Antibiotics)    Latex, Natural Rubber    Shellfish Containing Products    Strawberries [Strawberry]      Immunizations Administered on Date of Encounter - 3/16/2017     None      Orders Placed During Today's Visit     Future Labs/Procedures Expected by Expires    Lipid panel  3/16/2017 3/16/2018      Instructions    Us the meloxicam for severe symptoms.      We will check the cholesterol with your next lab draw       Language Assistance Services     ATTENTION: Language assistance services are available, free of charge. Please call [phone].  "     ATENCIÓN: Si habla español, tiene a horne disposición servicios gratuitos de asistencia lingüística. Madyson al [phone].     CHÚ Ý: N?u b?n nói Ti?ng Vi?t, có các d?ch v? h? tr? ngôn ng? mi?n phí dành cho b?n. G?i s? [phone].         [facility] complies with applicable Federal civil rights laws and does not discriminate on the basis of race, color, national origin, age, disability, or sex.

## 2017-03-17 ENCOUNTER — PATIENT MESSAGE (OUTPATIENT)
Dept: NEPHROLOGY | Facility: CLINIC | Age: 57
End: 2017-03-17

## 2017-04-07 ENCOUNTER — PATIENT MESSAGE (OUTPATIENT)
Dept: ADMINISTRATIVE | Facility: OTHER | Age: 57
End: 2017-04-07

## 2017-04-10 ENCOUNTER — OFFICE VISIT (OUTPATIENT)
Dept: FAMILY MEDICINE | Facility: CLINIC | Age: 57
End: 2017-04-10
Payer: MEDICARE

## 2017-04-10 VITALS
HEART RATE: 83 BPM | HEIGHT: 64 IN | TEMPERATURE: 98 F | WEIGHT: 167.13 LBS | SYSTOLIC BLOOD PRESSURE: 120 MMHG | DIASTOLIC BLOOD PRESSURE: 69 MMHG | BODY MASS INDEX: 28.53 KG/M2 | OXYGEN SATURATION: 98 %

## 2017-04-10 DIAGNOSIS — R29.818 POOR MOTOR CONTROL OF TRUNK: ICD-10-CM

## 2017-04-10 DIAGNOSIS — M54.50 CHRONIC BILATERAL LOW BACK PAIN WITHOUT SCIATICA: Chronic | ICD-10-CM

## 2017-04-10 DIAGNOSIS — Z23 NEED FOR TD VACCINE: ICD-10-CM

## 2017-04-10 DIAGNOSIS — Z74.09 DECREASED FUNCTIONAL MOBILITY AND ENDURANCE: ICD-10-CM

## 2017-04-10 DIAGNOSIS — H91.93 BILATERAL HEARING LOSS, UNSPECIFIED HEARING LOSS TYPE: Chronic | ICD-10-CM

## 2017-04-10 DIAGNOSIS — I10 WHITE COAT SYNDROME WITH HYPERTENSION: Primary | Chronic | ICD-10-CM

## 2017-04-10 DIAGNOSIS — N18.2 CKD (CHRONIC KIDNEY DISEASE), STAGE II: Chronic | ICD-10-CM

## 2017-04-10 DIAGNOSIS — G89.29 CHRONIC BILATERAL LOW BACK PAIN WITHOUT SCIATICA: Chronic | ICD-10-CM

## 2017-04-10 DIAGNOSIS — Z23 NEED FOR PROPHYLACTIC VACCINATION AND INOCULATION AGAINST INFLUENZA: ICD-10-CM

## 2017-04-10 DIAGNOSIS — F33.0 MILD RECURRENT MAJOR DEPRESSION: Chronic | ICD-10-CM

## 2017-04-10 PROCEDURE — 3078F DIAST BP <80 MM HG: CPT | Mod: S$GLB,,, | Performed by: FAMILY MEDICINE

## 2017-04-10 PROCEDURE — 90714 TD VACC NO PRESV 7 YRS+ IM: CPT | Mod: S$GLB,,, | Performed by: FAMILY MEDICINE

## 2017-04-10 PROCEDURE — 99999 PR PBB SHADOW E&M-EST. PATIENT-LVL III: CPT | Mod: PBBFAC,,, | Performed by: FAMILY MEDICINE

## 2017-04-10 PROCEDURE — 90471 IMMUNIZATION ADMIN: CPT | Mod: S$GLB,,, | Performed by: FAMILY MEDICINE

## 2017-04-10 PROCEDURE — 99215 OFFICE O/P EST HI 40 MIN: CPT | Mod: 25,S$GLB,, | Performed by: FAMILY MEDICINE

## 2017-04-10 PROCEDURE — 3074F SYST BP LT 130 MM HG: CPT | Mod: S$GLB,,, | Performed by: FAMILY MEDICINE

## 2017-04-10 PROCEDURE — 1160F RVW MEDS BY RX/DR IN RCRD: CPT | Mod: S$GLB,,, | Performed by: FAMILY MEDICINE

## 2017-04-10 PROCEDURE — 99499 UNLISTED E&M SERVICE: CPT | Mod: S$GLB,,, | Performed by: FAMILY MEDICINE

## 2017-04-10 NOTE — PROGRESS NOTES
Routine Office Visit    Patient Name: Omid Rascon    : 1960  MRN: [id number]    Subjective:  Omid is a 56 y.o. female who presents today for     1. Established patient - new to me - states that her insurance card has my name as pcp but she wanted to meet the doctor on her card before making her decision to change or keep Dr. Hendrickson as her primary.   2. Paperwork for lone repayment forgiveness - pt has been on disability since . 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 She has chronic back pain and is scheduled for an MRI by her neurologist. She stats that she wants to be able to teach, but is unable to stand or sit for long periods of time. She is unable to lift more than 2 pounds. She states she is able to move and has less pain because she has been in physical therapy. She is unable to work therefore unable to pay her student loans.       Answers for HPI/ROS submitted by the patient on 2017   neck pain: No,   unexpected weight change: No  rhinorrhea: No  trouble swallowing: No  visual disturbance: No  chest tightness: No  polyuria: No  difficulty urinating: No  menstrual problem: No  joint swelling: No  arthralgias: No  confusion: No  dysphoric mood: No    Review of Systems   Constitutional: Negative for chills and fever.   HENT: Positive for hearing loss. Negative for congestion.    Eyes: Negative for blurred vision and discharge.   Respiratory: Negative for cough and wheezing.    Cardiovascular: Negative for chest pain and palpitations.   Gastrointestinal: Negative for abdominal pain, blood in stool, constipation, diarrhea, heartburn, nausea and vomiting.   Genitourinary: Negative for dysuria and hematuria.   Musculoskeletal: Negative for myalgias.   Skin: Negative for itching and rash.   Neurological: Negative for dizziness, weakness and headaches.   Endo/Heme/Allergies: Negative for polydipsia.   Psychiatric/Behavioral: Negative for depression.       Active Problem List  Patient  Active Problem List   Diagnosis    White coat syndrome with hypertension    Hemoglobin C trait    Weakness of trunk musculature    Mild recurrent major depression    Decreased functional mobility and endurance    Poor motor control of trunk    Frontal skull lesion    Tortuous aorta    Renal cyst    CKD (chronic kidney disease), stage II    Monoclonal gammopathy    Chronic bilateral low back pain without sciatica    Bilateral hearing loss       Past Surgical History  History reviewed. No pertinent surgical history.    Family History  Family History   Problem Relation Age of Onset    Stroke Father     Heart failure Mother     No Known Problems Sister     No Known Problems Brother     No Known Problems Brother     No Known Problems Brother     No Known Problems Sister     No Known Problems Sister     No Known Problems Sister     No Known Problems Brother     No Known Problems Sister     Diabetes Sister     No Known Problems Maternal Aunt     No Known Problems Maternal Uncle     No Known Problems Paternal Aunt     No Known Problems Paternal Uncle     No Known Problems Maternal Grandmother     No Known Problems Maternal Grandfather     No Known Problems Paternal Grandmother     No Known Problems Paternal Grandfather     Amblyopia Neg Hx     Blindness Neg Hx     Cancer Neg Hx     Cataracts Neg Hx     Glaucoma Neg Hx     Hypertension Neg Hx     Macular degeneration Neg Hx     Retinal detachment Neg Hx     Strabismus Neg Hx     Thyroid disease Neg Hx        Social History  Social History     Social History    Marital status:      Spouse name: N/A    Number of children: N/A    Years of education: N/A     Occupational History    Not on file.     Social History Main Topics    Smoking status: Never Smoker    Smokeless tobacco: Never Used    Alcohol use No    Drug use: No    Sexual activity: Yes     Partners: Male     Other Topics Concern    Not on file     Social  "History Narrative       Medications and Allergies  Reviewed and updated.   Current Outpatient Prescriptions   Medication Sig    cetirizine (ZYRTEC) 10 MG tablet Take 1 tablet (10 mg total) by mouth daily as needed for Allergies or Rhinitis.    meloxicam (MOBIC) 15 MG tablet Take 1 tablet (15 mg total) by mouth daily as needed (low back pain). Change to daily as needed for pain if possible     No current facility-administered medications for this visit.        Physical Exam  /69  Pulse 83  Temp 98.2 °F (36.8 °C) (Oral)   Ht 5' 4" (1.626 m)  Wt 75.8 kg (167 lb 1.7 oz)  SpO2 98%  BMI 28.68 kg/m2  Physical Exam   Constitutional: She is oriented to person, place, and time. She appears well-developed and well-nourished.   HENT:   Head: Normocephalic and atraumatic.   Eyes: Conjunctivae and EOM are normal. Pupils are equal, round, and reactive to light.   Neck: Normal range of motion. Neck supple.   Cardiovascular: Normal rate, regular rhythm and normal heart sounds.  Exam reveals no gallop and no friction rub.    No murmur heard.  Pulmonary/Chest: Breath sounds normal. No respiratory distress.   Abdominal: Soft. Bowel sounds are normal. She exhibits no distension. There is no tenderness.   Musculoskeletal: Normal range of motion.   Lymphadenopathy:     She has no cervical adenopathy.   Neurological: She is alert and oriented to person, place, and time.   Skin: Skin is warm.   Psychiatric: She has a normal mood and affect.         Assessment/Plan:  Omid Rascon is a 56 y.o. female who presents today for :    White coat syndrome with hypertension  Patient is on digital hypertension program  bp wnl     Mild recurrent major depression  The current medical regimen is effective;  continue present plan and medications.    CKD (chronic kidney disease), stage II  Noted in chart - sees nephrologist   Scheduled for ultrasound for renal cyst     Chronic bilateral low back pain without sciatica / Decreased " functional mobility and endurance / Poor motor control of trunk  Sees neurologist   Scheduled for MRI      Bilateral hearing loss, unspecified hearing loss type  Noted in chart   Pt states it makes it difficult for her to work     Need for prophylactic vaccination and inoculation against influenza  -     Cancel: Influenza - Quadrivalent (3 years & older) (PF)    Need for TD vaccine  -     Td Vaccine- Preservative Free      Total time over 45 minutes with over 50% counseling.             Return in about 6 months (around 10/10/2017), or if symptoms worsen or fail to improve.

## 2017-04-10 NOTE — MR AVS SNAPSHOT
[facility]  [address]  [place] [address]  Phone: [phone]  Fax: [phone]                  Omid Rascon   4/10/2017 8:00 AM   Office Visit    Description:  Female : 1960   Provider:  Sakshi Mishra MD   Department:  [facility]           Reason for Visit     Establish Care     Hypertension           Diagnoses this Visit        Comments    White coat syndrome with hypertension    -  Primary     Mild recurrent major depression         CKD (chronic kidney disease), stage II         Chronic bilateral low back pain without sciatica         Bilateral hearing loss, unspecified hearing loss type         Need for prophylactic vaccination and inoculation against influenza         Need for TD vaccine                To Do List           Future Appointments        Provider Department Dept Phone    2017 8:00 AM [facility] ALL [facility] [phone]      Goals (5 Years of Data)              Today    17    Blood Pressure < 140/90   170/120        Blood Pressure <= 140/90   170/120  170/120  170/120    Notes - Note created  2016 12:55 PM by Janelle Guillen, PharmD    It is important to consistently maintain a controlled blood pressure.      Exercise at least 150 minutes per week.           Take at least one BP reading per week at various times of the day           Notes - Note created  3/15/2017  9:54 AM by Sabina Kessler    Make sure to take at least one BP reading per week.        [facility]     [facility] Nurse Care Line - 24/7 Assistance  Unless otherwise directed by your provider, please contact [facility], our nurse care line that is available for 24/7 assistance.     Registered nurses in the [facility] On Call Center provide: appointment scheduling, clinical advisement, health education, and other advisory services.  Call: [phone] (toll free)               Medications           Message  "regarding Medications     Verify the changes and/or additions to your medication regime listed below are the same as discussed with your clinician today.  If any of these changes or additions are incorrect, please notify your healthcare provider.             Verify that the below list of medications is an accurate representation of the medications you are currently taking.  If none reported, the list may be blank. If incorrect, please contact your healthcare provider. Carry this list with you in case of emergency.           Current Medications     cetirizine (ZYRTEC) 10 MG tablet Take 1 tablet (10 mg total) by mouth daily as needed for Allergies or Rhinitis.    meloxicam (MOBIC) 15 MG tablet Take 1 tablet (15 mg total) by mouth daily as needed (low back pain). Change to daily as needed for pain if possible           Clinical Reference Information           Your Vitals Were     BP Pulse Temp Height Weight SpO2    170/120 (BP Location: Right arm, Patient Position: Sitting, BP Method: Manual) 83 98.2 °F (36.8 °C) (Oral) 5' 4" (1.626 m) 75.8 kg (167 lb 1.7 oz) 98%    BMI                28.68 kg/m2          Blood Pressure          Most Recent Value    BP  (!)  170/120      Allergies as of 4/10/2017     Lactose    Sulfa (Sulfonamide Antibiotics)    Latex, Natural Rubber    Shellfish Containing Products    Strawberries [Strawberry]      Immunizations Administered on Date of Encounter - 4/10/2017     Name Date Dose VIS Date Route    TD  Incomplete 0.5 mL 2/24/2015 Intramuscular    influenza - Quadrivalent - PF (ADULT)  Incomplete 0.5 mL 8/7/2015 Intramuscular      Orders Placed During Today's Visit      Normal Orders This Visit    Influenza - Quadrivalent (3 years & older) (PF)     Td Vaccine- Preservative Free       Language Assistance Services     ATTENTION: Language assistance services are available, free of charge. Please call [phone].      ATENCIÓN: Si habla español, tiene a horne disposición servicios gratuitos de " asistencia lingüística. Madyson campbell [phone].     JAZMYNE Ý: N?u b?n nói Ti?ng Vi?t, có các d?ch v? h? tr? ngôn ng? mi?n phí dành cho b?n. G?i s? [phone].         [facility] complies with applicable Federal civil rights laws and does not discriminate on the basis of race, color, national origin, age, disability, or sex.

## 2017-04-12 ENCOUNTER — PATIENT OUTREACH (OUTPATIENT)
Dept: OTHER | Facility: OTHER | Age: 57
End: 2017-04-12
Payer: MEDICARE

## 2017-04-12 NOTE — PROGRESS NOTES
Last 5 Patient Entered Readings                                                               Current 30 Day Average: 121/73     Recent Readings 4/8/2017 4/8/2017 4/4/2017 4/4/2017 4/1/2017    Systolic BP (mmHg) 124 124 110 110 120    Diastolic BP (mmHg) 73 73 65 65 69    Pulse - 72 - 73 -        LVM to follow up with Ms. Omid Meeksgarcía Rascon. Inquired about how her low sodium diet and exercise is going. Overall, her readings are looking good and she continues to take readings weekly. Advised pt to call or message back if she has any questions or concerns.

## 2017-05-01 ENCOUNTER — HOSPITAL ENCOUNTER (OUTPATIENT)
Dept: RADIOLOGY | Facility: HOSPITAL | Age: 57
Discharge: HOME | End: 2017-05-01
Attending: INTERNAL MEDICINE
Payer: MEDICARE

## 2017-05-01 DIAGNOSIS — N28.1 RENAL CYST: ICD-10-CM

## 2017-05-01 DIAGNOSIS — N18.2 CKD (CHRONIC KIDNEY DISEASE), STAGE II: Chronic | ICD-10-CM

## 2017-05-01 DIAGNOSIS — D47.2 MONOCLONAL GAMMOPATHY: ICD-10-CM

## 2017-05-01 DIAGNOSIS — I10 ESSENTIAL HYPERTENSION: Chronic | ICD-10-CM

## 2017-05-01 PROCEDURE — 76770 US EXAM ABDO BACK WALL COMP: CPT | Mod: 26,,, | Performed by: RADIOLOGY

## 2017-05-01 PROCEDURE — 76770 US EXAM ABDO BACK WALL COMP: CPT | Mod: TC

## 2017-05-02 ENCOUNTER — TELEPHONE (OUTPATIENT)
Dept: NEPHROLOGY | Facility: CLINIC | Age: 57
End: 2017-05-02

## 2017-05-02 DIAGNOSIS — N28.1 RENAL CYST: Primary | ICD-10-CM

## 2017-05-02 NOTE — TELEPHONE ENCOUNTER
Patient with increased renal cysts (Bosniak II) - will send to urology for further evaluation!  I left a message on her answering machine about the findings and the plan.

## 2017-05-03 ENCOUNTER — OFFICE VISIT (OUTPATIENT)
Dept: OPTOMETRY | Facility: CLINIC | Age: 57
End: 2017-05-03
Payer: MEDICARE

## 2017-05-03 DIAGNOSIS — H43.391 VITREOUS FLOATERS OF RIGHT EYE: Primary | ICD-10-CM

## 2017-05-03 DIAGNOSIS — Z13.5 SCREENING FOR OTHER EYE CONDITIONS: ICD-10-CM

## 2017-05-03 DIAGNOSIS — Z13.5 SCREENING FOR EYE CONDITION: ICD-10-CM

## 2017-05-03 DIAGNOSIS — H52.203 HYPEROPIA WITH ASTIGMATISM AND PRESBYOPIA, BILATERAL: ICD-10-CM

## 2017-05-03 DIAGNOSIS — H52.03 HYPEROPIA WITH ASTIGMATISM AND PRESBYOPIA, BILATERAL: ICD-10-CM

## 2017-05-03 DIAGNOSIS — H52.4 HYPEROPIA WITH ASTIGMATISM AND PRESBYOPIA, BILATERAL: ICD-10-CM

## 2017-05-03 DIAGNOSIS — I10 ESSENTIAL HYPERTENSION: ICD-10-CM

## 2017-05-03 PROCEDURE — 99999 PR PBB SHADOW E&M-EST. PATIENT-LVL III: CPT | Mod: PBBFAC,,, | Performed by: OPTOMETRIST

## 2017-05-03 PROCEDURE — 92014 COMPRE OPH EXAM EST PT 1/>: CPT | Mod: S$GLB,,, | Performed by: OPTOMETRIST

## 2017-05-03 PROCEDURE — 99499 UNLISTED E&M SERVICE: CPT | Mod: S$GLB,,, | Performed by: OPTOMETRIST

## 2017-05-03 PROCEDURE — 92015 DETERMINE REFRACTIVE STATE: CPT | Mod: S$GLB,,, | Performed by: OPTOMETRIST

## 2017-05-03 NOTE — PATIENT INSTRUCTIONS
Trace nuclear sclerosis of lens of both eyes, consistent with age.  Central anterior vitreous floater(s) in the right eye.    No evidence of retinal tear/hole/break.  Hyperopia with astigmatism in each eye, with satisfactory correctable VA in each eye.  Presbyopia consistent with age.  New spectacle lens Rx issued for use as desired.  Ms. Rascon perceives no need for spectacle lens correction for distant viewing at this time.  Uses OTC reading glasses for reading.  Okay to continue to use OTC reading glasses if happy with near vision with those glasses and if happy with unaided distance VA.  Recheck in one year - or prior if notes increase in awareness of floaters, or flashes of light, or any decrease in vision in the interim.

## 2017-05-03 NOTE — MR AVS SNAPSHOT
[facility]  [address]  [facility] [address]  Phone: [phone]  Fax: [phone]                  Omid Rascon   5/3/2017 8:00 AM   Office Visit    Description:  Female : 1960   Provider:  Yusuf Bowden OD   Department:  [facility]           Reason for Visit     Concerns About Ocular Health                To Do List           Goals (5 Years of Data)              17    Blood Pressure < 140/90           Blood Pressure <= 140/90   110/67  110/67  110/67    Notes - Note created  2016 12:55 PM by Janelle Guillen PharmD    It is important to consistently maintain a controlled blood pressure.      Exercise at least 150 minutes per week.           Take at least one BP reading per week at various times of the day           Notes - Note created  3/15/2017  9:54 AM by Sabina Kessler    Make sure to take at least one BP reading per week.        [facility] On Call     [facility] On Call Nurse Care Line -  Assistance  Unless otherwise directed by your provider, please contact [facility] On-Call, our nurse care line that is available for  assistance.     Registered nurses in the [facility] provide: appointment scheduling, clinical advisement, health education, and other advisory services.  Call: [phone] (toll free)               Medications           Message regarding Medications     Verify the changes and/or additions to your medication regime listed below are the same as discussed with your clinician today.  If any of these changes or additions are incorrect, please notify your healthcare provider.             Verify that the below list of medications is an accurate representation of the medications you are currently taking.  If none reported, the list may be blank. If incorrect, please contact your healthcare provider. Carry this list with you in case of emergency.           Current Medications      cetirizine (ZYRTEC) 10 MG tablet Take 1 tablet (10 mg total) by mouth daily as needed for Allergies or Rhinitis.    meloxicam (MOBIC) 15 MG tablet Take 1 tablet (15 mg total) by mouth daily as needed (low back pain). Change to daily as needed for pain if possible           Clinical Reference Information           Allergies as of 5/3/2017     Lactose    Sulfa (Sulfonamide Antibiotics)    Latex, Natural Rubber    Shellfish Containing Products    Strawberries [Strawberry]      Immunizations Administered on Date of Encounter - 5/3/2017     None      Instructions    Trace nuclear sclerosis of lens of both eyes, consistent with age.  Central anterior vitreous floater(s) in the right eye.    No evidence of retinal tear/hole/break.  Hyperopia with astigmatism in each eye, with satisfactory correctable VA in each eye.  Presbyopia consistent with age.  New spectacle lens Rx issued for use as desired.  Ms. Rascon perceives no need for spectacle lens correction for distant viewing at this time.  Uses OTC reading glasses for reading.  Okay to continue to use OTC reading glasses if happy with near vision with those glasses and if happy with unaided distance VA.  Recheck in one year - or prior if notes increase in awareness of floaters, or flashes of light, or any decrease in vision in the interim.       Language Assistance Services     ATTENTION: Language assistance services are available, free of charge. Please call [phone].      ATENCIÓN: Si madina dasilvakamila, tiene a horne disposición servicios gratuitos de asistencia lingüística. Llame al [phone].     JAZMYNE Ý: N?u b?n nói Ti?ng Vi?t, có các d?ch v? h? tr? ngôn ng? mi?n phí dành cho b?n. G?i s? [phone].         [facility] complies with applicable Federal civil rights laws and does not discriminate on the basis of race, color, national origin, age, disability, or sex.

## 2017-05-03 NOTE — PROGRESS NOTES
"HPI     Concerns About Ocular Health    Additional comments: Eye exam and refraction.  Noted black spot/floater   in the right eye approx one week ago.  No change in appearance since   presented.            Comments   Patient's age: 57 y.o. female  Approximate date of last eye examination:  05/24/2016  Name of last eye doctor seen: Dr. Bowden   City/State: [facility]   Wears glasses? Yes     If yes, wears  Full-time or part-time?  Part-time mostly reading   Present glasses are: Bifocal, SV Distance, SV Reading?  Progressive lens  Approximate age of present glasses:  2015  Got new glasses following last exam, or subsequently?:  Yes   Any problem with VA with glasses? No  Wears CLs?:  No  Headaches?  No  Eye pain/discomfort?  No                                                                                     Flashes?  No  Floaters?  Yes, OD within the past 2 weeks   Diplopia/Double vision?  No  Patient's Ocular History:         Any eye surgeries? No         Any eye injury?  No         Any treatment for eye disease?  No  Family history of eye disease?  No  Significant patient medical history:         1. Diabetes?  No   2. HBP?  Yes, blood pressure is uncontrolled                ! OTC eyedrops currently using:  None   ! Prescription eye meds currently using:  None   ! Any history of allergy/adverse reaction to any eye meds used   previously?  No    ! Any history of allergy/adverse reaction to eyedrops used during prior   eye exam(s)? No    ! Any history of allergy/adverse reaction to Novacaine or similar meds?   No   ! Any history of allergy/adverse reaction to Epinephrine or similar meds?   No    ! Patient okay with use of anesthetic eyedrops to check eye pressure?    Yes        ! Patient okay with use of eyedrops to dilate pupils today?  Yes   !  Allergies/Medications/Medical History/Family History reviewed today?    Yes      PD =   66/62  Desired reading distance =  16"                                                 "                  Last edited by Yusuf Bowden, OD on 5/3/2017  8:46 AM. (History)            Assessment /Plan     For exam results, see Encounter Report.    1. Vitreous floaters of right eye     2. Essential hypertension     3. Screening for eye condition     4. Hyperopia with astigmatism and presbyopia, bilateral     5. Screening for other eye conditions                  Trace nuclear sclerosis of lens of both eyes, consistent with age.  Central anterior vitreous floater(s) in the right eye.    No evidence of retinal tear/hole/break.  Hyperopia with astigmatism in each eye, with satisfactory correctable VA in each eye.  Presbyopia consistent with age.  New spectacle lens Rx issued for use as desired.  Ms. Rascon perceives no need for spectacle lens correction for distant viewing at this time.  Uses OTC reading glasses for reading.  Okay to continue to use OTC reading glasses if happy with near vision with those glasses and if happy with unaided distance VA.  Recheck in one year - or prior if notes increase in awareness of floaters, or flashes of light, or any decrease in vision in the interim.

## 2017-05-10 ENCOUNTER — PATIENT MESSAGE (OUTPATIENT)
Dept: ADMINISTRATIVE | Facility: OTHER | Age: 57
End: 2017-05-10

## 2017-05-15 ENCOUNTER — PATIENT OUTREACH (OUTPATIENT)
Dept: OTHER | Facility: OTHER | Age: 57
End: 2017-05-15
Payer: MEDICARE

## 2017-05-15 NOTE — PROGRESS NOTES
Last 5 Patient Entered Readings                                                               Current 30 Day Average: 121/70     Recent Readings 5/10/2017 5/10/2017 5/10/2017 5/10/2017 5/6/2017    Systolic BP (mmHg) 123 123 118 118 118    Diastolic BP (mmHg) 72 72 69 69 75    Pulse - 70 - 74 -        LVM to follow up with Mrs. Omid Rascon. Inquired about how her low sodium diet and exercise is going.  Overall, her BP readings are looking great and are well controlled. She continues to take readings weekly. Advised pt to call or message back if she has any questions or concerns.

## 2017-06-08 ENCOUNTER — PATIENT MESSAGE (OUTPATIENT)
Dept: FAMILY MEDICINE | Facility: CLINIC | Age: 57
End: 2017-06-08

## 2017-06-09 ENCOUNTER — PATIENT OUTREACH (OUTPATIENT)
Dept: OTHER | Facility: OTHER | Age: 57
End: 2017-06-09
Payer: MEDICARE

## 2017-06-09 NOTE — PROGRESS NOTES
Last 5 Patient Entered Readings                                                               Current 30 Day Average: 118/71     Recent Readings 6/8/2017 6/8/2017 6/6/2017 6/6/2017 6/4/2017    Systolic BP (mmHg) 115 115 115 115 122    Diastolic BP (mmHg) 72 72 69 69 75    Pulse - 74 - 70 -        LVM to follow up with Mrs. Omid Rascon. Inquired about how her low sodium diet and exercise is going. Patients readings are well controlled and she does a great job with taking readings regularly. Advised pt to call or message back if she has any questions or concerns.

## 2017-06-11 ENCOUNTER — PATIENT MESSAGE (OUTPATIENT)
Dept: NEPHROLOGY | Facility: CLINIC | Age: 57
End: 2017-06-11

## 2017-06-27 ENCOUNTER — PATIENT OUTREACH (OUTPATIENT)
Dept: OTHER | Facility: OTHER | Age: 57
End: 2017-06-27

## 2017-06-27 NOTE — PROGRESS NOTES
Called patient to review readings. LVM.   BP at goal  Continue monitoring      Last 5 Patient Entered Readings                                                               Current 30 Day Average: 118/71     Recent Readings 6/26/2017 6/26/2017 6/22/2017 6/22/2017 6/14/2017    Systolic BP (mmHg) 122 122 121 124 114    Diastolic BP (mmHg) 71 71 69 73 71    Pulse - 71 - 71 -

## 2017-07-11 NOTE — PROGRESS NOTES
Ms. Rascon is doing well. She remains off BP medications. She's in good spirits. She has reviewed lifestyle modification information sent by  and is appreciative of follow up.       Last 5 Patient Entered Redings Current 30 Day Average: 118/72     Recent Readings 7/5/2017 7/5/2017 7/1/2017 7/1/2017 6/26/2017    Systolic BP (mmHg) 117 112 114 114 122    Diastolic BP (mmHg) 77 70 71 71 71    Pulse - 69 - 70 -          BP at goal, off meds  Continue monitoring

## 2017-07-13 ENCOUNTER — PATIENT OUTREACH (OUTPATIENT)
Dept: OTHER | Facility: OTHER | Age: 57
End: 2017-07-13

## 2017-08-07 ENCOUNTER — OFFICE VISIT (OUTPATIENT)
Dept: HEMATOLOGY/ONCOLOGY | Facility: CLINIC | Age: 57
End: 2017-08-07
Payer: MEDICARE

## 2017-08-07 ENCOUNTER — OFFICE VISIT (OUTPATIENT)
Dept: UROLOGY | Facility: CLINIC | Age: 57
End: 2017-08-07
Payer: MEDICARE

## 2017-08-07 ENCOUNTER — LAB VISIT (OUTPATIENT)
Dept: LAB | Facility: HOSPITAL | Age: 57
End: 2017-08-07
Attending: INTERNAL MEDICINE
Payer: MEDICARE

## 2017-08-07 VITALS
SYSTOLIC BLOOD PRESSURE: 169 MMHG | WEIGHT: 164.44 LBS | HEIGHT: 64 IN | BODY MASS INDEX: 28.07 KG/M2 | DIASTOLIC BLOOD PRESSURE: 112 MMHG | RESPIRATION RATE: 16 BRPM | HEART RATE: 86 BPM | OXYGEN SATURATION: 98 % | TEMPERATURE: 98 F

## 2017-08-07 VITALS
HEART RATE: 85 BPM | SYSTOLIC BLOOD PRESSURE: 173 MMHG | RESPIRATION RATE: 16 BRPM | WEIGHT: 164.44 LBS | HEIGHT: 64 IN | BODY MASS INDEX: 28.07 KG/M2 | DIASTOLIC BLOOD PRESSURE: 109 MMHG

## 2017-08-07 DIAGNOSIS — D47.2 MGUS (MONOCLONAL GAMMOPATHY OF UNKNOWN SIGNIFICANCE): ICD-10-CM

## 2017-08-07 DIAGNOSIS — N28.1 RENAL CYST: Primary | ICD-10-CM

## 2017-08-07 DIAGNOSIS — I10 ESSENTIAL HYPERTENSION: ICD-10-CM

## 2017-08-07 DIAGNOSIS — N18.2 CKD (CHRONIC KIDNEY DISEASE), STAGE II: Chronic | ICD-10-CM

## 2017-08-07 DIAGNOSIS — D47.2 MGUS (MONOCLONAL GAMMOPATHY OF UNKNOWN SIGNIFICANCE): Primary | ICD-10-CM

## 2017-08-07 LAB
ALBUMIN SERPL BCP-MCNC: 4 G/DL
ALP SERPL-CCNC: 60 U/L
ALT SERPL W/O P-5'-P-CCNC: 17 U/L
ANION GAP SERPL CALC-SCNC: 8 MMOL/L
AST SERPL-CCNC: 19 U/L
BASOPHILS # BLD AUTO: 0.02 K/UL
BASOPHILS NFR BLD: 0.5 %
BILIRUB SERPL-MCNC: 0.5 MG/DL
BUN SERPL-MCNC: 20 MG/DL
CALCIUM SERPL-MCNC: 9.5 MG/DL
CHLORIDE SERPL-SCNC: 108 MMOL/L
CO2 SERPL-SCNC: 26 MMOL/L
CREAT SERPL-MCNC: 1.2 MG/DL
DIFFERENTIAL METHOD: ABNORMAL
EOSINOPHIL # BLD AUTO: 0 K/UL
EOSINOPHIL NFR BLD: 0.8 %
ERYTHROCYTE [DISTWIDTH] IN BLOOD BY AUTOMATED COUNT: 14.1 %
EST. GFR  (AFRICAN AMERICAN): 57.6 ML/MIN/1.73 M^2
EST. GFR  (NON AFRICAN AMERICAN): 49.9 ML/MIN/1.73 M^2
GLUCOSE SERPL-MCNC: 81 MG/DL
HCT VFR BLD AUTO: 32.9 %
HGB BLD-MCNC: 11.5 G/DL
LYMPHOCYTES # BLD AUTO: 1.6 K/UL
LYMPHOCYTES NFR BLD: 42.4 %
MCH RBC QN AUTO: 26.5 PG
MCHC RBC AUTO-ENTMCNC: 35 G/DL
MCV RBC AUTO: 76 FL
MONOCYTES # BLD AUTO: 0.4 K/UL
MONOCYTES NFR BLD: 9.9 %
NEUTROPHILS # BLD AUTO: 1.7 K/UL
NEUTROPHILS NFR BLD: 46.4 %
PLATELET # BLD AUTO: 203 K/UL
PMV BLD AUTO: 9.4 FL
POTASSIUM SERPL-SCNC: 3.6 MMOL/L
PROT SERPL-MCNC: 8.2 G/DL
RBC # BLD AUTO: 4.34 M/UL
SODIUM SERPL-SCNC: 142 MMOL/L
WBC # BLD AUTO: 3.73 K/UL

## 2017-08-07 PROCEDURE — 99215 OFFICE O/P EST HI 40 MIN: CPT | Mod: S$GLB,,, | Performed by: INTERNAL MEDICINE

## 2017-08-07 PROCEDURE — 86334 IMMUNOFIX E-PHORESIS SERUM: CPT | Mod: 26,,, | Performed by: PATHOLOGY

## 2017-08-07 PROCEDURE — 80053 COMPREHEN METABOLIC PANEL: CPT

## 2017-08-07 PROCEDURE — 84165 PROTEIN E-PHORESIS SERUM: CPT

## 2017-08-07 PROCEDURE — 99999 PR PBB SHADOW E&M-EST. PATIENT-LVL III: CPT | Mod: PBBFAC,,, | Performed by: UROLOGY

## 2017-08-07 PROCEDURE — 99999 PR PBB SHADOW E&M-EST. PATIENT-LVL III: CPT | Mod: PBBFAC,,, | Performed by: INTERNAL MEDICINE

## 2017-08-07 PROCEDURE — 86334 IMMUNOFIX E-PHORESIS SERUM: CPT

## 2017-08-07 PROCEDURE — 3077F SYST BP >= 140 MM HG: CPT | Mod: S$GLB,,, | Performed by: INTERNAL MEDICINE

## 2017-08-07 PROCEDURE — 85025 COMPLETE CBC W/AUTO DIFF WBC: CPT

## 2017-08-07 PROCEDURE — 36415 COLL VENOUS BLD VENIPUNCTURE: CPT

## 2017-08-07 PROCEDURE — 84165 PROTEIN E-PHORESIS SERUM: CPT | Mod: 26,,, | Performed by: PATHOLOGY

## 2017-08-07 PROCEDURE — 99203 OFFICE O/P NEW LOW 30 MIN: CPT | Mod: S$GLB,,, | Performed by: UROLOGY

## 2017-08-07 PROCEDURE — 99499 UNLISTED E&M SERVICE: CPT | Mod: S$GLB,,, | Performed by: UROLOGY

## 2017-08-07 PROCEDURE — 83520 IMMUNOASSAY QUANT NOS NONAB: CPT

## 2017-08-07 PROCEDURE — 3080F DIAST BP >= 90 MM HG: CPT | Mod: S$GLB,,, | Performed by: INTERNAL MEDICINE

## 2017-08-07 NOTE — PROGRESS NOTES
Subjective:       Patient ID: Omid Rascon is a 57 y.o. female.    Chief Complaint: Frontal skull lesion     Mrs. Rascon is a 56 year old black female from the [place] with history fo hypertension, hgb C trait, hearing impairment, CKD2 and history of back injury who returns for follow up regarding her history of a right frontal head ba first detected January 2016 and later associated with an MGUS that was detected 11/10/16. Patient was last seen in clinic 12/15/16. Given the possibility of plasmacytoma, which could be treated with focal radiation. In addition, patient had new right preauricularnon tender ~1 cm masses on palpation. So it was recommended she have repeat imaging and be re evaulated by neurosurgery for possible biopsy. MRI of brain 12/23/17 showed the following:   Right frontal soft tissue lesion with transosseous extension to the adjacent frontal calvarium and persistent thick pachymeningeal enhancement overlying the right frontal lobe and extending along the interhemispheric fissure.  When compared to the MRI dated 03/21/2016, the enhancing component along the interhemispheric fissure has decreased in conspicuity with the remainder of the lesion unchanged. Nonenhancing well-circumscribed nodular lesions within the right preauricular region, nonspecific but concerning for pathologic lymph nodes.  Patient had blood work 2/27/17 which showed no monoclonal gammopathy. .   She saw neurosurgery 3/10/17 for evaluation of possible craniotomy for biopsy. Patient refused procedure.  She denies headaches or changes in vision. Her right frontal calvarial mass has resolved since April 2017 as has her right pre-auricular lesion. She attributes this resolution to discontinuing her medications for BP control. She states these medications were not helping her BP control and likely had an adverse effect on her.        Remote History:  Mrs. Rascon was evaluated by Dr. Keith of neurosurgery in January 2016 for  a right scalp mass. She had imaging studies 1/28/16 that included head CT and brain MRI. Right frontal T1 hypointense calvarial lesion with prominence of the overlying soft tissues. Additionally, there is a mildly enhancing dural lesion along the anterior falx without evidence for intra-axial enhancement or parenchymal lesion. Lucencies in the right frontal calvarium and hyperattenuating lesion along the anterior cerebral falx correspond to lesions seen on MRI.   She was referred to Dr. Ken for oncologic work up. CT of neck chest abdomen and pelvis were negative for malignancy in February 2016. Patient had repeat brain and and calvarium imaging in March of 2016 prior to anticipated biopsy. Lesions appeared unchanged.   Surgery for biopsy was postponed due to patient's uncontrolled blood pressure.   She has been seen by nephrology, Dr. Rosa to address her refractory hypertension as well as her early stage CKD. Serum free light cahin, SPEP and KEVIN drawn 11/10/16. FLC showed mildly elevated kappa = 2.29 with babak ratio. SPEP was normal but KEVIN detected an IgG lambda specific monoclonal protein at the beta/gamma junction. This had been seen on KEVIN 5/16/16 as well.   She reports longstanding history of anemia she attributes to Hgb C trait. She has received two blood transfusions in the distant past. She has a history of heavy menstrual bleeding which required a uterine lining ablation.      Review of Systems   Constitutional: Negative for fatigue and fever.   HENT: Negative for congestion and trouble swallowing.    Eyes: Negative for photophobia and visual disturbance.   Respiratory: Negative for cough and shortness of breath.    Cardiovascular: Negative for chest pain and leg swelling.   Gastrointestinal: Negative for abdominal distention and abdominal pain.   Genitourinary: Negative for dysuria and urgency.   Musculoskeletal: Positive for back pain. Negative for neck pain.   Neurological: Negative for  light-headedness and headaches.   Hematological: Negative for adenopathy. Does not bruise/bleed easily.   Psychiatric/Behavioral: Negative for agitation and behavioral problems.       Objective:      Physical Exam   Constitutional: She is oriented to person, place, and time. She appears well-developed.   HENT:   Mouth/Throat: Oropharynx is clear and moist. No oropharyngeal exudate.   Eyes: Conjunctivae are normal. Pupils are equal, round, and reactive to light.   Cardiovascular: Normal rate and regular rhythm.    Pulmonary/Chest: Effort normal and breath sounds normal.   Abdominal: Soft. Bowel sounds are normal.   Musculoskeletal: Normal range of motion. She exhibits no edema.   Lymphadenopathy:     She has no cervical adenopathy.   Neurological: She is alert and oriented to person, place, and time.   Skin: Skin is warm and dry.   Psychiatric: She has a normal mood and affect. Her behavior is normal.       Assessment:       1. MGUS (monoclonal gammopathy of unknown significance)        Plan:   Ms. Gonzales has had resolution of her right frontal calvarial mass. Her right pre-auricular dmitry mass has resolved as well. Will obtain serum biological studies to determine if indeed MGUS has persistently resolved as well.   If so, patient may follow up again at the end of the year per her request and then as needed.   Patient seen with Dr. Mannie Harrell MD   Pager [phone]

## 2017-08-07 NOTE — PROGRESS NOTES
Subjective:       Patient ID: Omid Rascon is a 57 y.o. female.    Chief Complaint:  speciality services (kidney cyst)      History of Present Illness  HPI  Patient is a 57 y.o. female who is new to our clinic and referred by their nephrologist, Dr. Rosa for evaluation of a renal cyst.  She admits to white coat hypertension which is why her blood pressure, which was reviewed today,is elevated.  She also has lower back pain and has been on meloxicam.  Onset was >5 years ago.    Patient has not had any weight loss, in fact weight gain. Has back pain but no other bone pain.    Ultrasound was independently reviewed today and reveals 4.7 right renal cyst, small/thin septation.  CT scan from 2/2016 was independently reviewed today and reveals 3.7cm right renal cyst.          Review of Systems  Review of Systems  All other systems reviewed and negative except pertinent positives noted in HPI.     Objective:     Physical Exam   Constitutional: She is oriented to person, place, and time. She appears well-developed and well-nourished. She is cooperative.  Non-toxic appearance.   HENT:   Head: Normocephalic.   Cardiovascular: Normal rate, intact distal pulses and normal pulses.    Pulmonary/Chest: Effort normal. No accessory muscle usage. No tachypnea. No respiratory distress.   Abdominal: Soft. Normal appearance. She exhibits no distension, no fluid wave, no ascites and no mass. There is no tenderness. There is no rebound and no CVA tenderness. No hernia.   Liver/spleen non-palpable   Musculoskeletal: Normal range of motion.   Neurological: She is alert and oriented to person, place, and time. She has normal strength.   Skin: No bruising and no rash noted.     Psychiatric: She has a normal mood and affect. Her speech is normal and behavior is normal. Thought content normal.       Lab Review  Lab Results   Component Value Date    COLORU Straw 03/16/2017    SPECGRAV 1.015 03/16/2017    PHUR 6.0 03/16/2017     NITRITE Negative 03/16/2017    KETONESU Negative 03/16/2017    UROBILINOGEN Negative 03/16/2017         Assessment:        1. Renal cyst    2. CKD (chronic kidney disease), stage II    3. Essential hypertension            Plan:     Renal cyst    CKD (chronic kidney disease), stage II    Essential hypertension      -films reviewed.  -minimally complex right renal cyst--Day 2. No concern for malignant potential.  -no further f/u or f/u imaging necessary.   -defer HTN management to nephrology

## 2017-08-07 NOTE — LETTER
August 7, 2017      Dereck Rosa MD  [address] [facility] [address]           [facility]  [address]  [place] [address]  Phone: [phone]          Patient: Omid Rascon   MR Number: [id number]   YOB: 1960   Date of Visit: 8/7/2017       Dear Dr. Dereck Rosa:    Thank you for referring Omid Rascon to me for evaluation. Attached you will find relevant portions of my assessment and plan of care.    If you have questions, please do not hesitate to call me. I look forward to following Omid Rascon along with you.    Sincerely,    Anthony Bliss MD    Enclosure  CC:  No Recipients    If you would like to receive this communication electronically, please contact [email or web address] or [phone] to request more information on [employer] access.    For providers and/or their staff who would like to refer a patient to [facility], please contact us through our one-stop-shop provider referral line, [facility], at [phone].    If you feel you have received this communication in error or would no longer like to receive these types of communications, please e-mail [email or web address]

## 2017-08-08 LAB
ALBUMIN SERPL ELPH-MCNC: 4.05 G/DL
ALPHA1 GLOB SERPL ELPH-MCNC: 0.3 G/DL
ALPHA2 GLOB SERPL ELPH-MCNC: 0.75 G/DL
B-GLOBULIN SERPL ELPH-MCNC: 0.77 G/DL
GAMMA GLOB SERPL ELPH-MCNC: 1.83 G/DL
INTERPRETATION SERPL IFE-IMP: NORMAL
KAPPA LC SER QL IA: 3.42 MG/DL
KAPPA LC/LAMBDA SER IA: 1.97
LAMBDA LC SER QL IA: 1.74 MG/DL
PATHOLOGIST INTERPRETATION IFE: NORMAL
PATHOLOGIST INTERPRETATION SPE: NORMAL
PROT SERPL-MCNC: 7.7 G/DL

## 2017-08-15 ENCOUNTER — PATIENT OUTREACH (OUTPATIENT)
Dept: OTHER | Facility: OTHER | Age: 57
End: 2017-08-15

## 2017-08-15 NOTE — PROGRESS NOTES
Last 5 Patient Entered Redings Current 30 Day Average: 118/74     Recent Readings 8/9/2017 8/9/2017 8/2/2017 8/2/2017 8/2/2017    Systolic BP (mmHg) 112 112 121 127 114    Diastolic BP (mmHg) 68 68 79 75 68    Pulse - 66 - 65 69          LVM to follow up with Mrs. Omid Rascon. Inquired about how her low sodium diet and exercise is going. Advised pt to call or message back if she has any questions or concerns.    Patients BP readings are overall controlled? yes    Patient takes weekly BP readings? yes    Reminded patient about what to do incase of a medical emergency (call 911, call [facility] on call or go to the ER).     Provided patient with my contact information.

## 2017-09-11 ENCOUNTER — PATIENT OUTREACH (OUTPATIENT)
Dept: OTHER | Facility: OTHER | Age: 57
End: 2017-09-11

## 2017-09-11 NOTE — PROGRESS NOTES
Last 5 Patient Entered Redings Current 30 Day Average: 117/71     Recent Readings 9/7/2017 9/7/2017 8/31/2017 8/31/2017 8/23/2017    Systolic BP (mmHg) 123 123 120 117 116    Diastolic BP (mmHg) 71 71 71 78 73    Pulse - 70 - 68 -          Hypertension Digital Medicine (HDMP) Health  Follow Up    LVM to follow up with Mrs. Omid Meeksgarcía Rascon.    Per 30 day average, blood pressure is well controlled 117/71 mmHg. Encouraged adherence to low sodium diet and physical activity guidelines. Advised patient to call or message with questions or concerns. WCB in 3-4 weeks.

## 2017-10-05 ENCOUNTER — PATIENT OUTREACH (OUTPATIENT)
Dept: OTHER | Facility: OTHER | Age: 57
End: 2017-10-05

## 2017-10-05 NOTE — PROGRESS NOTES
Last 5 Patient Entered Redings Current 30 Day Average: 121/69     Recent Readings 9/30/2017 9/30/2017 9/25/2017 9/25/2017 9/15/2017    Systolic BP (mmHg) 122 122 119 119 118    Diastolic BP (mmHg) 69 69 66 66 73    Pulse - 70 - 65 -          Hypertension Digital Medicine Program (HDMP): Health  Follow Up    Lifestyle Modifications:    1.Low sodium diet: yes    2.Physical activity: yes     3.Hypotension/Hypertension symptoms: no   Frequency/Alleviating factors/Precipitating factors, etc.     4.Patient has been compliant with the medication regimen.     Patient has been under some stress dealing with some issues that happened to her home along with dealing with the insurance company. She stated that she has not bee on top of taking her readings as often as she used to but will make an effort to get better with that. Other then that, she is doing well.     Follow up with Mrs. Omid Meeksgarcía Rascon completed. No further questions or concerns. I will follow up in a few weeks to assess progress.

## 2017-11-06 ENCOUNTER — PATIENT MESSAGE (OUTPATIENT)
Dept: ADMINISTRATIVE | Facility: OTHER | Age: 57
End: 2017-11-06

## 2017-11-21 ENCOUNTER — PATIENT OUTREACH (OUTPATIENT)
Dept: OTHER | Facility: OTHER | Age: 57
End: 2017-11-21

## 2017-11-21 NOTE — PROGRESS NOTES
Called patient to review readings. LVM.   BP at goal, off BP medications  Continue monitoring      Last 5 Patient Entered Readings Current 30 Day Average: 118/71     Recent Readings 11/18/2017 11/18/2017 11/13/2017 11/13/2017 11/6/2017    Systolic BP (mmHg) 117 117 114 114 122    Diastolic BP (mmHg) 74 74 72 72 65    Pulse - 69 - 66 71

## 2017-12-11 ENCOUNTER — LAB VISIT (OUTPATIENT)
Dept: LAB | Facility: HOSPITAL | Age: 57
End: 2017-12-11
Attending: INTERNAL MEDICINE
Payer: MEDICARE

## 2017-12-11 DIAGNOSIS — N28.1 RENAL CYST: ICD-10-CM

## 2017-12-11 DIAGNOSIS — D47.2 MONOCLONAL GAMMOPATHY: ICD-10-CM

## 2017-12-11 DIAGNOSIS — I10 ESSENTIAL HYPERTENSION: Chronic | ICD-10-CM

## 2017-12-11 DIAGNOSIS — N18.2 CKD (CHRONIC KIDNEY DISEASE), STAGE II: Chronic | ICD-10-CM

## 2017-12-11 LAB
ALBUMIN SERPL BCP-MCNC: 3.8 G/DL
ANION GAP SERPL CALC-SCNC: 6 MMOL/L
BUN SERPL-MCNC: 14 MG/DL
CALCIUM SERPL-MCNC: 8.5 MG/DL
CHLORIDE SERPL-SCNC: 105 MMOL/L
CO2 SERPL-SCNC: 28 MMOL/L
CREAT SERPL-MCNC: 1 MG/DL
EST. GFR  (AFRICAN AMERICAN): >60 ML/MIN/1.73 M^2
EST. GFR  (NON AFRICAN AMERICAN): >60 ML/MIN/1.73 M^2
GLUCOSE SERPL-MCNC: 111 MG/DL
PHOSPHATE SERPL-MCNC: 3.5 MG/DL
POTASSIUM SERPL-SCNC: 3.8 MMOL/L
SODIUM SERPL-SCNC: 139 MMOL/L

## 2017-12-11 PROCEDURE — 82088 ASSAY OF ALDOSTERONE: CPT

## 2017-12-11 PROCEDURE — 36415 COLL VENOUS BLD VENIPUNCTURE: CPT | Mod: PO

## 2017-12-11 PROCEDURE — 80069 RENAL FUNCTION PANEL: CPT

## 2017-12-11 PROCEDURE — 84244 ASSAY OF RENIN: CPT

## 2017-12-13 LAB — RENIN PLAS-CCNC: <0.6 NG/ML/H

## 2017-12-14 ENCOUNTER — OFFICE VISIT (OUTPATIENT)
Dept: NEPHROLOGY | Facility: CLINIC | Age: 57
End: 2017-12-14
Payer: MEDICARE

## 2017-12-14 VITALS
WEIGHT: 171.94 LBS | BODY MASS INDEX: 29.35 KG/M2 | DIASTOLIC BLOOD PRESSURE: 110 MMHG | OXYGEN SATURATION: 99 % | SYSTOLIC BLOOD PRESSURE: 150 MMHG | HEIGHT: 64 IN | HEART RATE: 78 BPM

## 2017-12-14 DIAGNOSIS — I10 WHITE COAT SYNDROME WITH DIAGNOSIS OF HYPERTENSION: Chronic | ICD-10-CM

## 2017-12-14 DIAGNOSIS — N28.1 RENAL CYST: Primary | ICD-10-CM

## 2017-12-14 LAB — ALDOST SERPL-MCNC: 20.3 NG/DL

## 2017-12-14 PROCEDURE — 99213 OFFICE O/P EST LOW 20 MIN: CPT | Mod: S$GLB,,, | Performed by: INTERNAL MEDICINE

## 2017-12-14 PROCEDURE — 99999 PR PBB SHADOW E&M-EST. PATIENT-LVL III: CPT | Mod: PBBFAC,,, | Performed by: INTERNAL MEDICINE

## 2017-12-14 PROCEDURE — 99499 UNLISTED E&M SERVICE: CPT | Mod: S$GLB,,, | Performed by: INTERNAL MEDICINE

## 2017-12-14 NOTE — PROGRESS NOTES
Subjective:       Patient ID: Omid Rascon is a 57 y.o. Black or  female who presents for a follow up evaluation of uncontrolled HTN  The patient has a history of HTN possibly 2013, depression since lower back injury. She was diagnosed with a frontal skull lesion and in that context it was found that she has difficult to control BP. She can not take lisinopril/amlodipine because of vague side effects and has a history of non-adherence with her medication. Supposedly she gets a rash from every pill she ever took except from norvasc. She is convinced that a variety of her antihypertensive medication is causing her to have high blood pressure rather than lowering it. She is also convinced that her lisinopril caused her frontal head tumor. Allergic to sulfur drugs. She is still very upset about her high BP and thinks it is caused by Lead exposure in the past and there is no way to talk that out of her. She had abulatory blood pressure monitoring done in the past and was diagnosed with white coat hypertension. She states to use a low salt diet. She also has a frontal bone lesion in her [place] and was seen in the past by neurosurgery, refusing biopsy, her bone lesion has improved/dissapeared. She is not on any BP meds anymore, instead taking Meloxicam for pain as needed.     The patient tells me that her blood pressure at home runs in the 130's systolic.     HPI  Review of Systems   Constitutional: Negative for activity change, appetite change, chills, fatigue, fever and unexpected weight change.   HENT: Negative.  Negative for nosebleeds.    Eyes: Negative.    Respiratory: Negative.  Negative for cough, chest tightness and shortness of breath.    Cardiovascular: Negative.  Negative for chest pain and leg swelling.   Gastrointestinal: Negative for abdominal pain, anal bleeding, diarrhea, nausea and vomiting.   Genitourinary: Negative for decreased urine volume, difficulty urinating, dysuria, flank  pain, frequency, hematuria, pelvic pain, urgency and vaginal bleeding.   Musculoskeletal: Negative.  Negative for joint swelling and myalgias.   Skin: Negative.  Negative for rash.   Neurological: Negative.    Psychiatric/Behavioral: Negative.    All other systems reviewed and are negative.      Objective:      Physical Exam   Constitutional: She is oriented to person, place, and time. She appears well-developed and well-nourished. No distress.   HENT:   Head: Normocephalic and atraumatic.   Right Ear: External ear normal.   Left Ear: External ear normal.   Nose: Nose normal.   Mouth/Throat: Oropharynx is clear and moist.   Eyes: Conjunctivae and EOM are normal. Pupils are equal, round, and reactive to light.   Neck: Normal range of motion. Neck supple. No thyromegaly present.   Cardiovascular: Normal rate, regular rhythm and intact distal pulses.    Murmur ( distant systolic flow murmur) heard.  Pulmonary/Chest: Effort normal and breath sounds normal. No respiratory distress. She has no wheezes. She has no rales. She exhibits no tenderness.   Abdominal: Soft. Normal appearance and bowel sounds are normal. She exhibits no distension. There is no tenderness. There is no rebound and no guarding.   Musculoskeletal: Normal range of motion. She exhibits no edema or tenderness.   Neurological: She is alert and oriented to person, place, and time. She has normal reflexes.   Skin: Skin is warm, dry and intact. She is not diaphoretic.   Psychiatric: She has a normal mood and affect. Her behavior is normal. Judgment and thought content normal.   Nursing note and vitals reviewed.      Assessment:       1. Renal cyst    2. White coat syndrome with diagnosis of hypertension        Plan:       1. CKD2: creatinine 1.0mg/dl. Not clear why and if this was when she was on.  Her renal ultrasound is not normal as it demonstrated signs of chronic kidney disease and a right sided cyst with septation.   In her routine work up a faint IgG  lambda specific monoclonal band was present and she is followed by hematology - seemed to have dissapeared in her last KEVIN.  - repeat renal US for her complex cyst in 6 month.           Lab Results   Component Value Date    CREATININE 1.0 12/11/2017     Protein Creatinine Ratios:    Prot/Creat Ratio, Ur   Date Value Ref Range Status   02/27/2017 0.09 0.00 - 0.20 Final   10/24/2016 0.11 0.00 - 0.20 Final   06/23/2016 0.25 (H) 0.00 - 0.20 Final     ·   ·   Acid-Base:   Lab Results   Component Value Date     12/11/2017    K 3.8 12/11/2017    CO2 28 12/11/2017     2. HTN: Blood pressures are controlled at home as she has ambulatory blood pressure monitoring with Mrs Mcclelland.  She could have a form of white coat hypertension or stress induced hypertension.  Continue relaxation techniques and follow up with ambulatory blood pressure measuring.  - she again reports normal blood pressure readings when she measures it at home with systolic < 120 mmHG.    She does not want any more blood pressure medication.   She will call me back if she needs the Amlodipine!     Is not taking the medication below because of multiple adverse reactions  Amlodipine/HCTZ - swelling and rash??  Lisinopril and Benicar - rah all over the body  Spironolactone - lost her voice         The patient was educated in practicing a low salt diet.  Avoid high salt foods (olives, pickles, smoked meats, salted potato chips, etc.).   Do not add salt to your food at the table.   Use only small amounts of salt when cooking.     3. Frontal scalp mass: was seen by hem/onc and they repeatedly recommended a biopsy and also an spine MRI given significant back pain located in cervical, thoracic, and lumbar spine - improved      Follow up with primary care and hem/onc.    RTC in 6 month with renal US

## 2017-12-20 ENCOUNTER — PATIENT OUTREACH (OUTPATIENT)
Dept: OTHER | Facility: OTHER | Age: 57
End: 2017-12-20

## 2017-12-20 DIAGNOSIS — D47.2 MGUS (MONOCLONAL GAMMOPATHY OF UNKNOWN SIGNIFICANCE): Primary | ICD-10-CM

## 2017-12-20 NOTE — PROGRESS NOTES
Last 5 Patient Entered Readings Current 30 Day Average: 116/70       Units 12/11/2017 12/3/2017 11/25/2017 11/18/2017 11/13/2017    Time -  7:59 PM 10:20 PM 10:12 PM  8:59 PM  3:36 PM    Systolic Blood Pressure - 117 112 120 117 114    Diastolic Blood Pressure - 67 72 70 74 72    Pulse bpm 68 69 69 69 66          Hypertension Digital Medicine Program (HDMP): Health  Follow Up    Lifestyle Modifications:    1.Low sodium diet: yes : Patient informed me that she continues maintaining a low sodium diet and doing well with that. She mentioned that she is trying to loose some weight and requested some other resources to help get her carb intake lower. I will send her some information on that. I also suggested that she speak with her PCP and see if she can get an appointment with a dietitian to help her get a more focused meal plan.     2.Physical activity: yes : Patient walks 4 miles a day. She is going to start going back to [place] so she can use the pool to swim and do water aerobics.     3.Hypotension/Hypertension symptoms: no   Frequency/Alleviating factors/Precipitating factors, etc.     4.Patient has been compliant with the medication regimen.     Follow up with Mrs. Omid Meeksgarcía Rascon completed. No further questions or concerns. I will follow up in a few weeks to assess progress.

## 2017-12-29 ENCOUNTER — LAB VISIT (OUTPATIENT)
Dept: LAB | Facility: HOSPITAL | Age: 57
End: 2017-12-29
Attending: INTERNAL MEDICINE
Payer: MEDICARE

## 2017-12-29 ENCOUNTER — OFFICE VISIT (OUTPATIENT)
Dept: HEMATOLOGY/ONCOLOGY | Facility: CLINIC | Age: 57
End: 2017-12-29
Payer: MEDICARE

## 2017-12-29 VITALS
BODY MASS INDEX: 29.02 KG/M2 | HEIGHT: 64 IN | TEMPERATURE: 98 F | OXYGEN SATURATION: 99 % | WEIGHT: 170 LBS | HEART RATE: 75 BPM | RESPIRATION RATE: 18 BRPM | SYSTOLIC BLOOD PRESSURE: 188 MMHG | DIASTOLIC BLOOD PRESSURE: 106 MMHG

## 2017-12-29 DIAGNOSIS — D47.2 MGUS (MONOCLONAL GAMMOPATHY OF UNKNOWN SIGNIFICANCE): Primary | ICD-10-CM

## 2017-12-29 DIAGNOSIS — M54.9 BACK PAIN, UNSPECIFIED BACK LOCATION, UNSPECIFIED BACK PAIN LATERALITY, UNSPECIFIED CHRONICITY: ICD-10-CM

## 2017-12-29 DIAGNOSIS — D64.9 ANEMIA, UNSPECIFIED TYPE: ICD-10-CM

## 2017-12-29 DIAGNOSIS — D47.2 MGUS (MONOCLONAL GAMMOPATHY OF UNKNOWN SIGNIFICANCE): ICD-10-CM

## 2017-12-29 LAB
BASOPHILS # BLD AUTO: 0.04 K/UL
BASOPHILS NFR BLD: 0.9 %
DIFFERENTIAL METHOD: ABNORMAL
EOSINOPHIL # BLD AUTO: 0.1 K/UL
EOSINOPHIL NFR BLD: 1.6 %
ERYTHROCYTE [DISTWIDTH] IN BLOOD BY AUTOMATED COUNT: 14.6 %
FERRITIN SERPL-MCNC: 71 NG/ML
HCT VFR BLD AUTO: 33.6 %
HGB BLD-MCNC: 11.4 G/DL
IMM GRANULOCYTES # BLD AUTO: 0.02 K/UL
IMM GRANULOCYTES NFR BLD AUTO: 0.5 %
IRON SERPL-MCNC: 77 UG/DL
LYMPHOCYTES # BLD AUTO: 1.7 K/UL
LYMPHOCYTES NFR BLD: 37.7 %
MCH RBC QN AUTO: 26.1 PG
MCHC RBC AUTO-ENTMCNC: 33.9 G/DL
MCV RBC AUTO: 77 FL
MONOCYTES # BLD AUTO: 0.4 K/UL
MONOCYTES NFR BLD: 9.8 %
NEUTROPHILS # BLD AUTO: 2.2 K/UL
NEUTROPHILS NFR BLD: 49.5 %
NRBC BLD-RTO: 0 /100 WBC
PLATELET # BLD AUTO: 195 K/UL
PMV BLD AUTO: 10 FL
RBC # BLD AUTO: 4.37 M/UL
SATURATED IRON: 21 %
TOTAL IRON BINDING CAPACITY: 363 UG/DL
TRANSFERRIN SERPL-MCNC: 245 MG/DL
WBC # BLD AUTO: 4.38 K/UL

## 2017-12-29 PROCEDURE — 84165 PROTEIN E-PHORESIS SERUM: CPT | Mod: 26,,, | Performed by: PATHOLOGY

## 2017-12-29 PROCEDURE — 99999 PR PBB SHADOW E&M-EST. PATIENT-LVL III: CPT | Mod: PBBFAC,GC,,

## 2017-12-29 PROCEDURE — 36415 COLL VENOUS BLD VENIPUNCTURE: CPT

## 2017-12-29 PROCEDURE — 83540 ASSAY OF IRON: CPT

## 2017-12-29 PROCEDURE — 82728 ASSAY OF FERRITIN: CPT

## 2017-12-29 PROCEDURE — 86334 IMMUNOFIX E-PHORESIS SERUM: CPT

## 2017-12-29 PROCEDURE — 83520 IMMUNOASSAY QUANT NOS NONAB: CPT

## 2017-12-29 PROCEDURE — 84165 PROTEIN E-PHORESIS SERUM: CPT

## 2017-12-29 PROCEDURE — 99214 OFFICE O/P EST MOD 30 MIN: CPT | Mod: GC,S$GLB,, | Performed by: INTERNAL MEDICINE

## 2017-12-29 PROCEDURE — 86334 IMMUNOFIX E-PHORESIS SERUM: CPT | Mod: 26,,, | Performed by: PATHOLOGY

## 2017-12-29 PROCEDURE — 85025 COMPLETE CBC W/AUTO DIFF WBC: CPT

## 2017-12-29 NOTE — PROGRESS NOTES
Subjective:       Patient ID: Omid Rascon is a 57 y.o. female.    Chief Complaint: No chief complaint on file.    Mrs. Rascon is a 56 year old black female from the [place] with history fo hypertension, hgb C trait, hearing impairment, CKD2 and history of back injury who returns for follow up regarding her history of a right frontal head ba first detected January 2016 and later associated with an MGUS that was detected 11/10/16. MGUS was no longer detected in 2/27/17.  Her right frontal calvarial mass resolved spontaneously by April 2017. On last visit in August 2017, patient had a mild elevation in serum kappa light chain 3.42 mg/dL with ratio 1.97. She presents today to follow up with lab work. She complains of more fatigue and more prominent back pain. No headache, changes in vision. No weight loss or night sweats, masses or lymphadenoapthy. Hgb slightly lower than prior at 11.4 g/dL. Has had chronically low MCV and MCH 77 and 26.1 respectively. She has normal plt and WBC. Cr on 12/11/17 was 1.0. SPEP and light chains are pending today.      Remote History:  Mrs. Rascon was evaluated by Dr. Keith of neurosurgery in January 2016 for a right scalp mass. She had imaging studies 1/28/16 that included head CT and brain MRI. Right frontal T1 hypointense calvarial lesion with prominence of the overlying soft tissues. Additionally, there is a mildly enhancing dural lesion along the anterior falx without evidence for intra-axial enhancement or parenchymal lesion. Lucencies in the right frontal calvarium and hyperattenuating lesion along the anterior cerebral falx correspond to lesions seen on MRI.   She was referred to Dr. Ken for oncologic work up. CT of neck chest abdomen and pelvis were negative for malignancy in February 2016. Patient had repeat brain and and calvarium imaging in March of 2016 prior to anticipated biopsy. Lesions appeared unchanged.   Surgery for biopsy was postponed due to patient's  uncontrolled blood pressure.   She has been seen by nephrology, Dr. Rosa to address her refractory hypertension as well as her early stage CKD. Serum free light cahin, SPEP and KEVIN drawn 11/10/16. FLC showed mildly elevated kappa = 2.29 with babak ratio. SPEP was normal but KEVIN detected an IgG lambda specific monoclonal protein at the beta/gamma junction. This had been seen on KEVIN 5/16/16 as well.   She reports longstanding history of anemia she attributes to Hgb C trait. She has received two blood transfusions in the distant past. She has a history of heavy menstrual bleeding which required a uterine lining ablation.      Review of Systems   Constitutional: Negative for appetite change and unexpected weight change.   HENT: Negative for congestion and trouble swallowing.    Eyes: Negative for visual disturbance.   Respiratory: Positive for cough. Negative for shortness of breath.    Cardiovascular: Negative for chest pain.   Gastrointestinal: Negative for abdominal pain and diarrhea.   Genitourinary: Negative for frequency and urgency.   Musculoskeletal: Positive for back pain.   Skin: Negative for rash.   Neurological: Negative for headaches.   Hematological: Negative for adenopathy.   Psychiatric/Behavioral: The patient is not nervous/anxious.        Objective:      Physical Exam   Constitutional: She is oriented to person, place, and time. She appears well-developed and well-nourished.   HENT:   Mouth/Throat: Oropharynx is clear and moist. No oropharyngeal exudate.   Eyes: Conjunctivae are normal. Pupils are equal, round, and reactive to light.   Cardiovascular: Normal rate and regular rhythm.    Pulmonary/Chest: Effort normal and breath sounds normal.   Abdominal: Soft. Bowel sounds are normal.   Musculoskeletal: Normal range of motion.   Lymphadenopathy:     She has no cervical adenopathy.   Neurological: She is alert and oriented to person, place, and time.   Skin: Skin is warm and dry.   Psychiatric: She  has a normal mood and affect. Her behavior is normal.       Assessment:       1. MGUS (monoclonal gammopathy of unknown significance)    2. Anemia, unspecified type    3. Back pain, unspecified back location, unspecified back pain laterality, unspecified chronicity        Plan:   MGUS - Follow up pending studies (SPEP, KEVIN and light chains). Will check MRI of spine and skull x-ray  Anemia - microcytosis and hypochromia are chronic and likely related to underlying hemoglobinopathy however not consistent with hgb C. Will check for possible iron deficiency and add on iron studies to today's labs. If normal, may check a hgb electrophoresis to characterize possible hemoglobinopathy.    Back pain - obtain imaging studies.     Follow up in two weeks with lab and imaging results. Thanks   Patient agrees with plan. All questions answered to satisfaction.   Patient seen with Dr. Suraj Harrell MD   Pager [phone]    ATTENDING NOTE, ONCOLOGY INPATIENT TEAM    As above.  Patient seen and examined, chart reviewed.  Appears comfortable, in NAD.  Lungs are clear to auscultation.  Abdomen is soft, nontender.  Labs reviewed.    PLAN  Check ferritin, free light chains, and SPEP; proceed with a skull x ray and a spine MRI, and RTC in 1-2 weeks to see me and Dr. Harrell.  Her multiple questions were answered to her satisfaction.      Vasu Campbell MD

## 2017-12-29 NOTE — PROGRESS NOTES
ATTENDING NOTE, ONCOLOGY CLINIC      Patient was seen interviewed, and examined with Dr. Don.  Please refer to my note of same day for our assessment and plan.

## 2017-12-29 NOTE — Clinical Note
Can we please get a skull x ray and MRI of cervical, thoracic, and lumbar spine (I had ordered it in 3/2017 but never scheduled - let me know if you can or cant see it)

## 2018-01-02 DIAGNOSIS — D64.9 ANEMIA, UNSPECIFIED TYPE: Primary | ICD-10-CM

## 2018-01-02 LAB
ALBUMIN SERPL ELPH-MCNC: 4.18 G/DL
ALPHA1 GLOB SERPL ELPH-MCNC: 0.3 G/DL
ALPHA2 GLOB SERPL ELPH-MCNC: 0.7 G/DL
B-GLOBULIN SERPL ELPH-MCNC: 0.81 G/DL
GAMMA GLOB SERPL ELPH-MCNC: 1.92 G/DL
INTERPRETATION SERPL IFE-IMP: NORMAL
KAPPA LC SER QL IA: 2.95 MG/DL
KAPPA LC/LAMBDA SER IA: 1.77
LAMBDA LC SER QL IA: 1.67 MG/DL
PATHOLOGIST INTERPRETATION IFE: NORMAL
PATHOLOGIST INTERPRETATION SPE: NORMAL
PROT SERPL-MCNC: 7.9 G/DL

## 2018-01-05 ENCOUNTER — OFFICE VISIT (OUTPATIENT)
Dept: FAMILY MEDICINE | Facility: CLINIC | Age: 58
End: 2018-01-05
Payer: MEDICARE

## 2018-01-05 VITALS
BODY MASS INDEX: 29.77 KG/M2 | TEMPERATURE: 97 F | DIASTOLIC BLOOD PRESSURE: 80 MMHG | HEIGHT: 64 IN | HEART RATE: 98 BPM | WEIGHT: 174.38 LBS | SYSTOLIC BLOOD PRESSURE: 160 MMHG | OXYGEN SATURATION: 98 %

## 2018-01-05 DIAGNOSIS — M54.50 CHRONIC BILATERAL LOW BACK PAIN WITHOUT SCIATICA: Chronic | ICD-10-CM

## 2018-01-05 DIAGNOSIS — N18.2 CKD (CHRONIC KIDNEY DISEASE), STAGE II: Chronic | ICD-10-CM

## 2018-01-05 DIAGNOSIS — I77.1 TORTUOUS AORTA: Chronic | ICD-10-CM

## 2018-01-05 DIAGNOSIS — Z00.00 ROUTINE MEDICAL EXAM: Primary | ICD-10-CM

## 2018-01-05 DIAGNOSIS — I10 WHITE COAT SYNDROME WITH DIAGNOSIS OF HYPERTENSION: Chronic | ICD-10-CM

## 2018-01-05 DIAGNOSIS — M54.2 NECK PAIN: ICD-10-CM

## 2018-01-05 DIAGNOSIS — G89.29 CHRONIC BILATERAL LOW BACK PAIN WITHOUT SCIATICA: Chronic | ICD-10-CM

## 2018-01-05 DIAGNOSIS — H91.93 BILATERAL HEARING LOSS, UNSPECIFIED HEARING LOSS TYPE: Chronic | ICD-10-CM

## 2018-01-05 PROCEDURE — 99499 UNLISTED E&M SERVICE: CPT | Mod: S$GLB,,, | Performed by: INTERNAL MEDICINE

## 2018-01-05 PROCEDURE — 99999 PR PBB SHADOW E&M-EST. PATIENT-LVL III: CPT | Mod: PBBFAC,,, | Performed by: INTERNAL MEDICINE

## 2018-01-05 PROCEDURE — 99396 PREV VISIT EST AGE 40-64: CPT | Mod: S$GLB,,, | Performed by: INTERNAL MEDICINE

## 2018-01-05 NOTE — PROGRESS NOTES
Assessment & Plan  Problem List Items Addressed This Visit        ENT    Bilateral hearing loss (Chronic)    Current Assessment & Plan     Reports she was told she needed a cochlear implant.  Unchanged.  Monitor.             Cardiac/Vascular    White coat syndrome with diagnosis of hypertension (Chronic)    Overview     In Digital HTN program with excellent control.          Current Assessment & Plan     Monitor. Home readings reviewed and look great.          Tortuous aorta (Chronic)    Overview     Stable, asymptomatic chronic condition.  Will continue to maximize risk factor reduction and adjust medication as needed.             Renal/    CKD (chronic kidney disease), stage II (Chronic)    Current Assessment & Plan     Followed by renal.  Monitor             Orthopedic    Chronic bilateral low back pain without sciatica (Chronic)    Current Assessment & Plan     Previously responded very well to PT.  I counseled her that I will also monitor her MRI that was ordered by Heme/Onc.  We discussed that I do not order back braces due to the potential to worsen low back pain.  If she doesn't respond to PT, she may need to be referred to the back and spine clinic.          Relevant Orders    Ambulatory Referral to Physical/Occupational Therapy      Other Visit Diagnoses     Routine medical exam    -  Primary  -  Discussed healthy diet, regular exercise, necessary labs, age appropriate cancer screening, and routine vaccinations.       Neck pain    -  Referred to PT.    Relevant Orders    Ambulatory Referral to Physical/Occupational Therapy            Health Maintenance reviewed, declines flu vaccination.    Follow-up: Return in about 1 year (around 1/5/2019) for Routine Physical.    ______________________________________________________________________    Chief Complaint  Chief Complaint   Patient presents with    Back Pain    Annual Exam       HPI  Omid Macias Abiodun is a 57 y.o. female with multiple medical  diagnoses as listed in the medical history and problem list that presents for routine physical.  Pt is known to me with his last appointment 04/2017.      She is c/o back pain today.  Previously she responded very well to PT. She has had MRI C/T/L spine ordered by her Heme/Onc provider.        PAST MEDICAL HISTORY:  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       PAST SURGICAL HISTORY:  Past Surgical History:   Procedure Laterality Date    NO PAST SURGERIES         SOCIAL HISTORY:  Social History     Social History    Marital status:      Spouse name: N/A    Number of children: N/A    Years of education: N/A     Occupational History    Not on file.     Social History Main Topics    Smoking status: Never Smoker    Smokeless tobacco: Never Used    Alcohol use No    Drug use: No    Sexual activity: Yes     Partners: Male     Other Topics Concern    Not on file     Social History Narrative    No narrative on file       FAMILY HISTORY:  Family History   Problem Relation Age of Onset    Stroke Father     Heart failure Mother     No Known Problems Sister     No Known Problems Brother     No Known Problems Brother     No Known Problems Brother     No Known Problems Sister     No Known Problems Sister     No Known Problems Sister     No Known Problems Brother     No Known Problems Sister     Diabetes Sister     No Known Problems Maternal Aunt     No Known Problems Maternal Uncle     No Known Problems Paternal Aunt     No Known Problems Paternal Uncle     No Known Problems Maternal Grandmother     No Known Problems Maternal Grandfather     No Known Problems Paternal Grandmother     No Known Problems Paternal Grandfather     Amblyopia Neg Hx     Blindness Neg  Hx     Cancer Neg Hx     Cataracts Neg Hx     Glaucoma Neg Hx     Hypertension Neg Hx     Macular degeneration Neg Hx     Retinal detachment Neg Hx     Strabismus Neg Hx     Thyroid disease Neg Hx        ALLERGIES AND MEDICATIONS: updated and reviewed.  Review of patient's allergies indicates:   Allergen Reactions    Lactose     Sulfa (sulfonamide antibiotics) Swelling    Latex, natural rubber Rash    Shellfish containing products Rash    Strawberries [strawberry] Rash     Current Outpatient Prescriptions   Medication Sig Dispense Refill    cetirizine (ZYRTEC) 10 MG tablet Take 1 tablet (10 mg total) by mouth daily as needed for Allergies or Rhinitis. 30 tablet 11    meloxicam (MOBIC) 15 MG tablet Take 1 tablet (15 mg total) by mouth daily as needed (low back pain). Change to daily as needed for pain if possible 90 tablet 0     No current facility-administered medications for this visit.          ROS  Review of Systems   Constitutional: Negative for chills, fever and unexpected weight change.   HENT: Negative for congestion, ear pain, hearing loss, rhinorrhea, sore throat and trouble swallowing.    Eyes: Negative for discharge, redness and visual disturbance.   Respiratory: Negative for cough, chest tightness, shortness of breath and wheezing.    Cardiovascular: Negative for chest pain, palpitations and leg swelling.   Gastrointestinal: Negative for abdominal pain, constipation, diarrhea, nausea and vomiting.   Endocrine: Negative for polydipsia, polyphagia and polyuria.   Genitourinary: Negative for decreased urine volume, dysuria, hematuria and pelvic pain.   Musculoskeletal: Positive for back pain. Negative for arthralgias, joint swelling and myalgias.   Skin: Negative for color change and rash.   Neurological: Positive for weakness (with standing from squating position). Negative for dizziness, light-headedness, numbness and headaches.   Psychiatric/Behavioral: Negative for decreased  "concentration, dysphoric mood, sleep disturbance and suicidal ideas.           Physical Exam  Vitals:    01/05/18 0814   BP: (!) 160/80   Pulse: 98   Temp: 97.3 °F (36.3 °C)   SpO2: 98%   Weight: 79.1 kg (174 lb 6.1 oz)   Height: 5' 4" (1.626 m)    Body mass index is 29.93 kg/m².  Weight: 79.1 kg (174 lb 6.1 oz)   Height: 5' 4" (162.6 cm)   Physical Exam   Constitutional: She is oriented to person, place, and time. She appears well-developed and well-nourished. No distress.   HENT:   Head: Normocephalic and atraumatic.   Right Ear: Tympanic membrane, external ear and ear canal normal.   Left Ear: Tympanic membrane, external ear and ear canal normal.   Nose: Nose normal. Right sinus exhibits no maxillary sinus tenderness and no frontal sinus tenderness. Left sinus exhibits no maxillary sinus tenderness and no frontal sinus tenderness.   Mouth/Throat: Uvula is midline, oropharynx is clear and moist and mucous membranes are normal. No tonsillar exudate.   Eyes: Conjunctivae, EOM and lids are normal. Pupils are equal, round, and reactive to light. No scleral icterus.   Neck: Full passive range of motion without pain. Neck supple. No JVD present. No spinous process tenderness and no muscular tenderness present. Carotid bruit is not present. No thyromegaly present.   Cardiovascular: Normal rate, regular rhythm, S1 normal, S2 normal and intact distal pulses.  Exam reveals no S3, no S4 and no friction rub.    No murmur heard.  Pulmonary/Chest: Effort normal and breath sounds normal. She has no wheezes. She has no rhonchi. She has no rales.   Abdominal: Soft. Bowel sounds are normal. She exhibits no distension. There is no hepatosplenomegaly. There is no tenderness. There is no rebound and no CVA tenderness.   Musculoskeletal: Normal range of motion. She exhibits no edema or tenderness.   Lymphadenopathy:        Head (right side): No submental and no submandibular adenopathy present.        Head (left side): No submental and " no submandibular adenopathy present.     She has no cervical adenopathy.   Neurological: She is alert and oriented to person, place, and time. Coordination normal.   Motor grossly intact.  Sensory grossly intact.  Symmetric facial movements palate elevated symmetrically tongue midline     Skin: Skin is warm and dry. No rash noted. No cyanosis. Nails show no clubbing.   Psychiatric: She has a normal mood and affect. Her speech is normal and behavior is normal. Thought content normal. Cognition and memory are normal.         Health Maintenance       Date Due Completion Date    Pneumococcal PCV13 (High Risk) (1 - PCV13 Required) 04/30/1960 ---    Pneumococcal PPSV23 (High Risk) (1) 04/30/1977 ---    Influenza Vaccine 08/01/2017 1/14/2016 (Declined)    Override on 1/14/2016: Declined    Override on 3/28/2014: Not Clinically Appropriate    Pap Smear with HPV Cotest 03/10/2018 3/10/2015    Mammogram 05/26/2018 5/26/2016    Lipid Panel 03/29/2019 3/29/2014    Colonoscopy 08/01/2020 8/1/2010 (Done)    Override on 8/1/2010: Done (reportedly normal per patient)    TETANUS VACCINE 04/10/2027 4/10/2017

## 2018-01-05 NOTE — ASSESSMENT & PLAN NOTE
Previously responded very well to PT.  I counseled her that I will also monitor her MRI that was ordered by Heme/Onc.  We discussed that I do not order back braces due to the potential to worsen low back pain.  If she doesn't respond to PT, she may need to be referred to the back and spine clinic.

## 2018-01-08 ENCOUNTER — HOSPITAL ENCOUNTER (OUTPATIENT)
Dept: RADIOLOGY | Facility: HOSPITAL | Age: 58
Discharge: HOME | End: 2018-01-08
Attending: INTERNAL MEDICINE
Payer: MEDICARE

## 2018-01-08 DIAGNOSIS — M54.89 MIDLINE BACK PAIN, UNSPECIFIED BACK LOCATION, UNSPECIFIED CHRONICITY: ICD-10-CM

## 2018-01-08 DIAGNOSIS — D47.2 MGUS (MONOCLONAL GAMMOPATHY OF UNKNOWN SIGNIFICANCE): ICD-10-CM

## 2018-01-08 PROCEDURE — 72157 MRI CHEST SPINE W/O & W/DYE: CPT | Mod: TC

## 2018-01-08 PROCEDURE — 72158 MRI LUMBAR SPINE W/O & W/DYE: CPT | Mod: TC

## 2018-01-08 PROCEDURE — A9585 GADOBUTROL INJECTION: HCPCS | Performed by: INTERNAL MEDICINE

## 2018-01-08 PROCEDURE — 70260 X-RAY EXAM OF SKULL: CPT | Mod: 26,,, | Performed by: RADIOLOGY

## 2018-01-08 PROCEDURE — 25500020 PHARM REV CODE 255: Performed by: INTERNAL MEDICINE

## 2018-01-08 PROCEDURE — 72158 MRI LUMBAR SPINE W/O & W/DYE: CPT | Mod: 26,,, | Performed by: RADIOLOGY

## 2018-01-08 PROCEDURE — 72156 MRI NECK SPINE W/O & W/DYE: CPT | Mod: 26,,, | Performed by: RADIOLOGY

## 2018-01-08 PROCEDURE — 72157 MRI CHEST SPINE W/O & W/DYE: CPT | Mod: 26,,, | Performed by: RADIOLOGY

## 2018-01-08 PROCEDURE — 70260 X-RAY EXAM OF SKULL: CPT | Mod: TC

## 2018-01-08 PROCEDURE — 72156 MRI NECK SPINE W/O & W/DYE: CPT | Mod: TC

## 2018-01-08 RX ORDER — GADOBUTROL 604.72 MG/ML
9 INJECTION INTRAVENOUS
Status: COMPLETED | OUTPATIENT
Start: 2018-01-08 | End: 2018-01-08

## 2018-01-08 RX ADMIN — GADOBUTROL 9 ML: 604.72 INJECTION INTRAVENOUS at 05:01

## 2018-01-16 ENCOUNTER — CLINICAL SUPPORT (OUTPATIENT)
Dept: REHABILITATION | Facility: HOSPITAL | Age: 58
End: 2018-01-16
Attending: INTERNAL MEDICINE
Payer: MEDICARE

## 2018-01-16 DIAGNOSIS — M62.81 WEAKNESS OF TRUNK MUSCULATURE: ICD-10-CM

## 2018-01-16 DIAGNOSIS — R29.818 POOR MOTOR CONTROL OF TRUNK: ICD-10-CM

## 2018-01-16 DIAGNOSIS — G89.29 CHRONIC BILATERAL LOW BACK PAIN WITHOUT SCIATICA: Primary | Chronic | ICD-10-CM

## 2018-01-16 DIAGNOSIS — M54.50 CHRONIC BILATERAL LOW BACK PAIN WITHOUT SCIATICA: Primary | Chronic | ICD-10-CM

## 2018-01-16 DIAGNOSIS — Z74.09 DECREASED FUNCTIONAL MOBILITY AND ENDURANCE: ICD-10-CM

## 2018-01-16 PROCEDURE — G8978 MOBILITY CURRENT STATUS: HCPCS | Mod: CL,PN

## 2018-01-16 PROCEDURE — 97161 PT EVAL LOW COMPLEX 20 MIN: CPT | Mod: PN

## 2018-01-16 PROCEDURE — G8979 MOBILITY GOAL STATUS: HCPCS | Mod: CK,PN

## 2018-01-16 NOTE — PROGRESS NOTES
See Full Physical Therapy Evaluation in Plan of Care                                                      Physical Therapy Initial Evaluation     Name: Omid Macias [facility] Number: [id number]    Diagnosis:   Encounter Diagnoses   Name Primary?    Chronic bilateral low back pain without sciatica Yes    Poor motor control of trunk     Weakness of trunk musculature     Decreased functional mobility and endurance      Physician: Ti Hendrickson MD  Treatment Orders: PT Eval and Treat    TREATMENT     Time In: 800  Time Out: 900    PT Evaluation Completed? Yes  Discussed Plan of Care with patient: Yes    Omid received 0 minutes of therapeutic exercise & instruction including:    Omid received 0 minutes of manual therapy including:      Written Home Exercises Provided: Not This Session    Assessment     Pt's spiritual, cultural and educational needs considered and pt agreeable to plan of care and goals as stated below:     Short Term GOALS: 4 weeks. Pt agrees with goals set.  1. Patient demonstrates independence with HEP.   2. Patient demonstrates independence with Postural Awareness.   3. Patient demonstrates independence with body mechanics.   4. Patient will report pain of 6/10 at worst, on 0-10 pain scale, with all activity  5. Patient demonstrates increased cervical active ROM by 10 degrees in all planes to improve tolerance to functional activities pain free.   6. Patient demonstrates increased lumbar active ROM by 10 degrees to improve tolerance to functional activities pain free.     Long Term GOALS: 8 weeks. Pt agrees with goals set.  1. Patient demonstrates increased cervical ROM to WFL to improve tolerance to functional activities pain free.   2. Patient demonstrates increased strength BLE's to 4+/5 or greater to improve tolerance to functional activities pain free.   3. Patient demonstrates improved overall function per FOTO Lumbar Survey to 50% Limitation or less.   4. Patient will report  pain of 3/10 at worst, on 0-10 pain scale, with all activity  5. Patient demonstrates increased lumbar ROM to WFL to improve tolerance to functional activities pain free  6. Patient demonstrates ability to perform functional squat with proper movement pattern, 5 times consecutively, thighs to 90 degrees parallel, to improve tolerance to picking objects off floor    Functional Limitations Reports - G Codes  Category: Mobility  Tool: FOTO Lumbar Survey  Score: 62% Limitation   Modifier  Impairment Limitation Restriction    CH  0 % impaired, limited or restricted    CI  @ least 1% but less than 20% impaired, limited or restricted    CJ  @ least 20%<40% impaired, limited or restricted    CK  @ least 40%<60% impaired, limited or restricted    CL  @ least 60% <80% impaired, limited or restricted    CM  @ least 80%<100% impaired limited or restricted    CN  100% impaired, limited or restricted     Current/: CL = 60-80% Limitation   Goal/ : CK = 40-60% Limitation    Functional Limitations Reports - G Codes  Category: Mobility  Tool: FOTO Cervical Survey  Score: 58% Limitation   Modifier  Impairment Limitation Restriction    CH  0 % impaired, limited or restricted    CI  @ least 1% but less than 20% impaired, limited or restricted    CJ  @ least 20%<40% impaired, limited or restricted    CK  @ least 40%<60% impaired, limited or restricted    CL  @ least 60% <80% impaired, limited or restricted    CM  @ least 80%<100% impaired limited or restricted    CN  100% impaired, limited or restricted     Current/: CL = 60-80% Limitation   Goal/ : CK = 40-60% Limitation    PLAN     Certification Period: 1/16/18 - 4/16/18    Outpatient physical therapy 1-2 times weekly to include: pt ed, hep, therapeutic exercises, neuromuscular re-education/ balance exercises, manual therapy, joint mobilizations, aquatic therapy and modalities prn. Cont PT for 10-12 weeks. Pt may be seen by PTA as part of the rehabilitation team.      Therapist: Mendez Sorto, PT  1/16/2018    I have seen the patient, reviewed the therapist's plan of care, and I agree with the plan of care.      I certify the need for these services furnished under this plan of treatment and while under my care.     ___________________ ________ Physician/Referring Practitioner            ___________________________ Date of Signature

## 2018-01-16 NOTE — PLAN OF CARE
Physical Therapy Initial Evaluation     Name: Omid Macias [facility] Number: [id number]    Diagnosis:   Encounter Diagnoses   Name Primary?    Chronic bilateral low back pain without sciatica Yes    Poor motor control of trunk     Weakness of trunk musculature     Decreased functional mobility and endurance      Physician: Ti Hendrickson MD  Treatment Orders: PT Eval and Treat  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     Current Outpatient Prescriptions   Medication Sig    cetirizine (ZYRTEC) 10 MG tablet Take 1 tablet (10 mg total) by mouth daily as needed for Allergies or Rhinitis.    meloxicam (MOBIC) 15 MG tablet Take 1 tablet (15 mg total) by mouth daily as needed (low back pain). Change to daily as needed for pain if possible     No current facility-administered medications for this visit.      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 She now reports  upper back, lower back, and R arm pain. She previously was walking with cane due to LE weakness and lower back pain. Low back was resolved following PT episode at this clinic with [facility] in 2015. Back pain would intermittently reoccur during lifting and heavy household activities. Significant low back pain reoccurred in November 2017 (~3 months ago) following increased stairs/walking on vacation to [place]. Believes upper back pain returned due to  "lifting/cooking during holidays. Denies pain radiating to posterior legs, pain isolated to lumbar region. Neck pain and upper back/R shoulder pain can radiate to mid-scapular and thoracic.  She reports maintaining functional squat position to pick objects.  Pt denies numbness/tingling. She gets most relief lying supine or standing, can't sit for long periods. Denies surgeries or injections to cervical/lumbar spine.     MRI: 1/8/18  "Lumbar degenerative changes contributing to mild bilateral neural foraminal narrowing at L4-L5 and L5-S1.  No spinal canal stenosis."    "Cervical spine alignment is within normal limits.  No spondylolisthesis.  Vertebral body heights are well-maintained without evidence for fracture."    Pain Scale: Vasilca rates pain on a scale of 0-10 to be 8 at worst in lumbar; 3 currently; 2 at best .  Onset: gradual  Radicular symptoms:  None  Aggravating factors:   Lifting, reaching overhead, prolonged sitting  Easing factors:  Standing, lying supine  Prior Therapy: Yes for Lumbar pain in 2015 at this clinic  Home Environment (Steps/Adaptations): 1-story house, 1 step threshold  Functional Deficits Leading to Referral: Difficulty walking, sitting, lifting, stairs  Prior functional status: Independent  Bowel/Bladder Change: none  DME owned/used: [employer]  Occupation:  Disability ~2009                        Pts goals: Return to work (/online teaching), improve ability to lifting, improve tolerance to sitting, reduce pain    Objective     Posture Alignment: sway-back posture, forward head, rounded shoulders    UE ROM:  L UE: WFL  R UE: ~80%    CERVICAL SPINE AROM:   Flexion: 20 p! R cervical   Extension: 10 p! R cervical   Left Sidebend: 40   Right Sidebend: 20    Left Rotation: 10   Right Rotation: 20     SEGMENTAL MOBILITY: Mild joint hypomobility cervical spine C2-7    UPPER EXTREMITY STRENGTH:   Left Right   Shoulder Flexion 4/5 4-/5   Shoulder Abduction 4/5 3+/5 "     Shoulder Internal Rotation 4/5 3+/5   Shoulder External Rotation 4/5 3+/5   Elbow Flexion 4/5 3+/5   Elbow Extension 4/5 3+/5   Wrist Flexion 4/5 4/5   Wrist Extension 4/5 4/5     LUMBAR SPINE AROM:   Flexion: 10/0 - 10   Extension: 15/5 - 10   Left Sidebend: 15   Right Sidebend: 8   Left Rotation: Decreased   Right Rotation: Decreased     SEGMENTAL MOBILITY: Joint Mobility WFL    LOWER EXTREMITY STRENGTH:   Left Right   Quadriceps 4-/5 4-/5   Hamstrings 3+/5 3+/5     Iliopsoas 3+/5 3+/5   PGM 3+/5 3+/5   Hip IR 3+/5 3+/5   Hip ER 3+/5 3+/5   Hip Ext 3-/5 3-/5   Ankle DF 4-/5 4-/5   Ankle PF 4-/5 4-/5       Dermatomes: Sensation: Light Touch: Intact  Saddle Sensation: Intact  Myotomes: Intact  Flexibility: Decreased B HS, Hip Flexor Flexibility    Special Tests:   Left Right   Slump Negative Negative   SLR Negative Negative   Crossed SLR Negative Negative   Sacral Thrust Negative Negative   GERBER Negative Negative     GAIT: Omid ambulates with no assistive device with independently.     GAIT DEVIATIONS: Omid displays decreased stride length, decreased gait speed, decreased heel strike    Pt/family was provided educational information, including: role of PT, goals for PT, scheduling - pt verbalized understanding. Discussed insurance limitations with pt.     TREATMENT     Time In: 800  Time Out: 900    PT Evaluation Completed? Yes  Discussed Plan of Care with patient: Yes    Omid received 0 minutes of therapeutic exercise & instruction including:    Omid received 0 minutes of manual therapy including:      Written Home Exercises Provided: Not This Session    Assessment     Patient presents to Physical Therapy Evaluation with diagnosis of Low Back and Cervical Pain, with signs and symptoms including: increased lumbar, thoracic, and cervical pain, decreased ROM in neck and low back, decreased strength, dysfunctional soft tissue, dysfunctional gait pattern, impaired movement pattern, and decreased  tolerance to functional activities. Pt with pain primarily located at R cervical spine, R mid-scapular and shoulder region, and bilateral low back region. Pt with no signs of neural tension in either UE/LE, negative signs of radiating LE pain with negative Slump and SLR test. Pt with significant ROM deficits in cervical and lumbar spine actively, however Pt approaches full cervical rotation passively in supine. Pt with significant strength deficits in bilateral UE/LE, with greater deficit in R UE compared to L UE due to pain. Pt wit high anxiety towards performing active lumbar flexion, with Pt unable to achieve greater than 10 degrees lumbar active flexion upon command, Pt noted to perform greater lumbar flexion during unprompted movement patterns throughout evaluation. Pt with good motivation to perform physical activity and responds fairly to cueing.      Pt prognosis is Good.  Pt will benefit from skilled outpatient physical therapy to address the above stated deficits, provide pt/family education and to maximize pt's level of independence.     Medical necessity is demonstrated by the following IMPAIRMENTS/PROBLEMS:  weakness, impaired endurance, impaired self care skills, impaired functional mobility, gait instability, impaired balance, decreased upper extremity function, decreased lower extremity function, decreased safety awareness, pain, abnormal tone, decreased ROM, impaired coordination, impaired joint extensibility and impaired muscle length      History  Co-morbidities and personal factors that may impact the plan of care Examination  Body Structures and Functions, activity limitations and participation restrictions that may impact the plan of care Clinical Presentation   Decision Making/ Complexity Score   Co-morbidities:       -CKD  -Depression  -Hearing Difficulty        Personal Factors:   -Transportation  -Long-term disability  -Fearful with motion Body Regions: BLE, trunk/core     Body Systems:  musculoskeletal (ROM, strength, endurance, flexibility, gait); neuromuscular (balance, posture, motor control, coordination)          Activity limitations: sitting, standing, walking, bending, squatting, lifting, carrying, reaching, pushing/pulling      Participation Restrictions: work activity, heavy household activities, cooking,          Stable, complicated   Low Complexity    FOTO Lumbar Survey  Score: 62% Limitation    FOTO Cervical Survey  Score: 58% Limitation       Pt's spiritual, cultural and educational needs considered and pt agreeable to plan of care and goals as stated below:     Short Term GOALS: 4 weeks. Pt agrees with goals set.  1. Patient demonstrates independence with HEP.   2. Patient demonstrates independence with Postural Awareness.   3. Patient demonstrates independence with body mechanics.   4. Patient will report pain of 6/10 at worst, on 0-10 pain scale, with all activity  5. Patient demonstrates increased cervical active ROM by 10 degrees in all planes to improve tolerance to functional activities pain free.   6. Patient demonstrates increased lumbar active ROM by 10 degrees to improve tolerance to functional activities pain free.     Long Term GOALS: 8 weeks. Pt agrees with goals set.  1. Patient demonstrates increased cervical ROM to WFL to improve tolerance to functional activities pain free.   2. Patient demonstrates increased strength BLE's to 4+/5 or greater to improve tolerance to functional activities pain free.   3. Patient demonstrates improved overall function per FOTO Lumbar Survey to 50% Limitation or less.   4. Patient will report pain of 3/10 at worst, on 0-10 pain scale, with all activity  5. Patient demonstrates increased lumbar ROM to WFL to improve tolerance to functional activities pain free  6. Patient demonstrates ability to perform functional squat with proper movement pattern, 5 times consecutively, thighs to 90 degrees parallel, to improve tolerance to picking  objects off floor    Functional Limitations Reports - G Codes  Category: Mobility  Tool: FOTO Lumbar Survey  Score: 62% Limitation   Modifier  Impairment Limitation Restriction    CH  0 % impaired, limited or restricted    CI  @ least 1% but less than 20% impaired, limited or restricted    CJ  @ least 20%<40% impaired, limited or restricted    CK  @ least 40%<60% impaired, limited or restricted    CL  @ least 60% <80% impaired, limited or restricted    CM  @ least 80%<100% impaired limited or restricted    CN  100% impaired, limited or restricted     Current/: CL = 60-80% Limitation   Goal/ : CK = 40-60% Limitation    Functional Limitations Reports - G Codes  Category: Mobility  Tool: FOTO Cervical Survey  Score: 58% Limitation   Modifier  Impairment Limitation Restriction    CH  0 % impaired, limited or restricted    CI  @ least 1% but less than 20% impaired, limited or restricted    CJ  @ least 20%<40% impaired, limited or restricted    CK  @ least 40%<60% impaired, limited or restricted    CL  @ least 60% <80% impaired, limited or restricted    CM  @ least 80%<100% impaired limited or restricted    CN  100% impaired, limited or restricted     Current/: CL = 60-80% Limitation   Goal/ : CK = 40-60% Limitation    PLAN     Certification Period: 1/16/18 - 4/16/18    Outpatient physical therapy 1-2 times weekly to include: pt ed, hep, therapeutic exercises, neuromuscular re-education/ balance exercises, manual therapy, joint mobilizations, aquatic therapy and modalities prn. Cont PT for 10-12 weeks. Pt may be seen by PTA as part of the rehabilitation team.     Therapist: Mendez Sorto, PT  1/16/2018    I have seen the patient, reviewed the therapist's plan of care, and I agree with the plan of care.      I certify the need for these services furnished under this plan of treatment and while under my care.     ___________________ ________ Physician/Referring Practitioner             ___________________________ Date of Signature

## 2018-01-19 ENCOUNTER — CLINICAL SUPPORT (OUTPATIENT)
Dept: REHABILITATION | Facility: HOSPITAL | Age: 58
End: 2018-01-19
Attending: INTERNAL MEDICINE
Payer: MEDICARE

## 2018-01-19 ENCOUNTER — OFFICE VISIT (OUTPATIENT)
Dept: HEMATOLOGY/ONCOLOGY | Facility: CLINIC | Age: 58
End: 2018-01-19
Payer: MEDICARE

## 2018-01-19 VITALS
BODY MASS INDEX: 28.66 KG/M2 | DIASTOLIC BLOOD PRESSURE: 112 MMHG | OXYGEN SATURATION: 99 % | WEIGHT: 172 LBS | RESPIRATION RATE: 18 BRPM | SYSTOLIC BLOOD PRESSURE: 178 MMHG | TEMPERATURE: 98 F | HEIGHT: 65 IN | HEART RATE: 80 BPM

## 2018-01-19 DIAGNOSIS — D64.9 ANEMIA, UNSPECIFIED TYPE: Primary | ICD-10-CM

## 2018-01-19 DIAGNOSIS — G89.29 CHRONIC BILATERAL LOW BACK PAIN WITHOUT SCIATICA: Primary | ICD-10-CM

## 2018-01-19 DIAGNOSIS — M54.50 CHRONIC BILATERAL LOW BACK PAIN WITHOUT SCIATICA: Primary | ICD-10-CM

## 2018-01-19 DIAGNOSIS — M62.81 WEAKNESS OF TRUNK MUSCULATURE: ICD-10-CM

## 2018-01-19 DIAGNOSIS — R29.818 POOR MOTOR CONTROL OF TRUNK: ICD-10-CM

## 2018-01-19 DIAGNOSIS — Z74.09 DECREASED FUNCTIONAL MOBILITY AND ENDURANCE: ICD-10-CM

## 2018-01-19 PROCEDURE — 99215 OFFICE O/P EST HI 40 MIN: CPT | Mod: GC,S$GLB,, | Performed by: INTERNAL MEDICINE

## 2018-01-19 PROCEDURE — 99999 PR PBB SHADOW E&M-EST. PATIENT-LVL III: CPT | Mod: PBBFAC,GC,,

## 2018-01-19 PROCEDURE — 97110 THERAPEUTIC EXERCISES: CPT | Mod: PN

## 2018-01-19 PROCEDURE — 3077F SYST BP >= 140 MM HG: CPT | Mod: CPTII,GC,S$GLB, | Performed by: INTERNAL MEDICINE

## 2018-01-19 PROCEDURE — 3080F DIAST BP >= 90 MM HG: CPT | Mod: CPTII,GC,S$GLB, | Performed by: INTERNAL MEDICINE

## 2018-01-19 NOTE — PROGRESS NOTES
Subjective:       Patient ID: Omid Rascon is a 57 y.o. female.    Chief Complaint: No chief complaint on file.    Mrs. Rascon is a 56 year old black female from [place] with history for hypertension, hgb C trait, hearing impairment, CKD2 and history of back injury who returns for follow up regarding her history of a right frontal head ba first detected January 2016 and later associated with an MGUS that was detected 11/10/16. MGUS was no longer detected in 2/27/17.  Her right frontal calvarial mass resolved spontaneously by April 2017. In August 2017, patient had a mild elevation in serum kappa light chain 3.42 mg/dL with ratio 1.97. Repeat 12/29/17 showed decrease in kappa light chain to 2.95 with ratio of 1.77.  On last visit she complained of more fatigue and more prominent back pain. No headache, changes in vision. No weight loss or night sweats, masses or lymphadenoapthy. Hgb slightly lower than prior at 11.4 g/dL. Has had chronically low MCV and MCH 77 and 26.1 respectively. She has normal plt and WBC. Cr on 12/11/17 was 1.0. Iron studies were within normal range. She had an MRI of spine 1/8/18 which showed focal area of marrow signal abnormality posterior to the T4 vertebral body, possibly a normal variant and a focal area of marrow signal abnormality posterior to the L4 vertebral body which is favored to represent a slightly atypical osseous hemangioma. She presents today to discuss results of studies. She started PT for back pain today.      Remote History:  Mrs. Rascon was evaluated by Dr. Keith of neurosurgery in January 2016 for a right scalp mass. She had imaging studies 1/28/16 that included head CT and brain MRI. Right frontal T1 hypointense calvarial lesion with prominence of the overlying soft tissues. Additionally, there is a mildly enhancing dural lesion along the anterior falx without evidence for intra-axial enhancement or parenchymal lesion. Lucencies in the right frontal  calvarium and hyperattenuating lesion along the anterior cerebral falx correspond to lesions seen on MRI.   She was referred to Dr. Ken for oncologic work up. CT of neck chest abdomen and pelvis were negative for malignancy in February 2016. Patient had repeat brain and and calvarium imaging in March of 2016 prior to anticipated biopsy. Lesions appeared unchanged.   Surgery for biopsy was postponed due to patient's uncontrolled blood pressure.   She has been seen by nephrology, Dr. Rosa to address her refractory hypertension as well as her early stage CKD. Serum free light cahin, SPEP and KEVIN drawn 11/10/16. FLC showed mildly elevated kappa = 2.29 with babak ratio. SPEP was normal but KEVIN detected an IgG lambda specific monoclonal protein at the beta/gamma junction. This had been seen on KEVIN 5/16/16 as well.   She reports longstanding history of anemia she attributes to Hgb C trait. She has received two blood transfusions in the distant past. She has a history of heavy menstrual bleeding which required a uterine lining ablation.      Review of Systems   Constitutional: Negative for appetite change and unexpected weight change.   HENT: Negative for congestion and trouble swallowing.    Eyes: Negative for visual disturbance.   Respiratory: Negative for cough and shortness of breath.    Cardiovascular: Negative for chest pain.   Gastrointestinal: Negative for abdominal pain and diarrhea.   Genitourinary: Negative for frequency and hematuria.   Musculoskeletal: Positive for back pain.   Skin: Negative for rash.   Neurological: Negative for numbness and headaches.   Hematological: Negative for adenopathy. Does not bruise/bleed easily.   Psychiatric/Behavioral: Negative for agitation. The patient is not nervous/anxious.        Objective:      Physical Exam   Constitutional: She is oriented to person, place, and time. She appears well-developed and well-nourished.   HENT:   Mouth/Throat: Oropharynx is clear and  moist. No oropharyngeal exudate.   Eyes: Conjunctivae are normal. Pupils are equal, round, and reactive to light.   Neck: Normal range of motion.   Cardiovascular: Normal rate and regular rhythm.    Pulmonary/Chest: Effort normal and breath sounds normal.   Abdominal: Soft. Bowel sounds are normal.   Musculoskeletal: Normal range of motion.   Lymphadenopathy:     She has no cervical adenopathy.   Neurological: She is alert and oriented to person, place, and time.   Skin: Skin is warm and dry.   Psychiatric: She has a normal mood and affect. Her behavior is normal.       Assessment:       1. Anemia, unspecified type        Plan:   Will evaluate history of hemoglobinopathy with hgb electrophoresis today. RTC in three months. Will follow counts and light chain trend.  No need for further imaging at this time. All questions answered to satisfaction.     Rocio Harrell MD   Pager [phone]

## 2018-01-19 NOTE — PROGRESS NOTES
"                                                    Physical Therapy Daily Note     Name: Omid Macias [facility] Number: [id number]  Diagnosis:   Encounter Diagnoses   Name Primary?    Chronic bilateral low back pain without sciatica Yes    Poor motor control of trunk     Weakness of trunk musculature     Decreased functional mobility and endurance      Physician: Ti Hendrickson MD    Visit #: 2 of 20  DIA: 12/31/18    Time In: 700  Time Out: 800  Total Treatment Time 1:1: 60 minutes (30 min 1:1 with PT)    Precaution: Impaired Hearing    Subjective     Pt reports: She is feeling the same since last session. Mild pain this morning.  Pain Scale: Omid rates pain on a scale of 0-10 to be 1 currently.    Objective     Omid received individual therapeutic exercises to develop strength, endurance, ROM, flexibility, posture and core stabilization for 60 minutes including:      Nustep 6'  Calf Stretrch c/ board 3x30"  Trunk Flexion c/ ball 3-way 3 min    LTR c/ ball 3x10  DKTC c/ ball 3x10  HS Str c/ strap 3x30"  Supine Hip Flexor Str c/ strap 3x30"  Piriformis Str 3x30"  TrA Brace 2x10  Bridging 2x10    Omid received the following manual therapy techniques: Joint mobilizations and Manual traction were applied to the: Lumbar Spine for 0 minutes including:    Written Home Exercises Provided: Yes - LTR, DKTC, Hamstring, Piriformis, TrA Brace, Bridging  Pt demo good understanding of the education provided. Omid demonstrated good return demonstration of activities.     PT/PTA face to face conference performed during this session    Assessment     Patient tolerated treatment session very well. Pt with no significant symptom exacerbation throughout entire treatment session, with easily achieved therapeutic effect during strength and flexibility training. Pt continues to demonstrate significant deficts in lumbar flexion during ROM/flexibility training in sitting/supine positoin. Pt with appropriate gluteal " strength and motor control to perform bridging activity with slow, controlled movement pattern.    This is a 57 y.o. female referred to outpatient physical therapy and presents with a medical diagnosis of Low Back Pain and demonstrates limitations as described in the problem list. Pt prognosis is Good. Pt will continue to benefit from skilled outpatient physical therapy to address the deficits listed in the problem list, provide pt/family education and to maximize pt's level of independence in the home and community environment.     Goals as follows:  Short Term GOALS: 4 weeks. Pt agrees with goals set.  1. Patient demonstrates independence with HEP.   2. Patient demonstrates independence with Postural Awareness.   3. Patient demonstrates independence with body mechanics.   4. Patient will report pain of 6/10 at worst, on 0-10 pain scale, with all activity  5. Patient demonstrates increased cervical active ROM by 10 degrees in all planes to improve tolerance to functional activities pain free.   6. Patient demonstrates increased lumbar active ROM by 10 degrees to improve tolerance to functional activities pain free.      Plan     Certification Period: 1/16/18 - 4/16/18    Continue with established Plan of Care towards PT goals.    Therapist: Mendez Sorto, PT  1/19/2018

## 2018-01-23 ENCOUNTER — CLINICAL SUPPORT (OUTPATIENT)
Dept: REHABILITATION | Facility: HOSPITAL | Age: 58
End: 2018-01-23
Attending: INTERNAL MEDICINE
Payer: MEDICARE

## 2018-01-23 DIAGNOSIS — M54.50 CHRONIC BILATERAL LOW BACK PAIN WITHOUT SCIATICA: Primary | ICD-10-CM

## 2018-01-23 DIAGNOSIS — R29.818 POOR MOTOR CONTROL OF TRUNK: ICD-10-CM

## 2018-01-23 DIAGNOSIS — M62.81 WEAKNESS OF TRUNK MUSCULATURE: ICD-10-CM

## 2018-01-23 DIAGNOSIS — Z74.09 DECREASED FUNCTIONAL MOBILITY AND ENDURANCE: ICD-10-CM

## 2018-01-23 DIAGNOSIS — G89.29 CHRONIC BILATERAL LOW BACK PAIN WITHOUT SCIATICA: Primary | ICD-10-CM

## 2018-01-23 PROCEDURE — 97110 THERAPEUTIC EXERCISES: CPT | Mod: PN

## 2018-01-23 NOTE — PROGRESS NOTES
"                                                    Physical Therapy Daily Note     Name: Omid Macias [facility] Number: [id number]  Diagnosis:   Encounter Diagnoses   Name Primary?    Chronic bilateral low back pain without sciatica Yes    Poor motor control of trunk     Weakness of trunk musculature     Decreased functional mobility and endurance      Physician: Ti Hendrickson MD    Visit #: 3 of 20  DIA: 12/31/18    Time In: 700  Time Out: 800  Total Treatment Time 1:1: 60 minutes (60 min 1:1 with PT)    Precaution: Impaired Hearing    Subjective     Pt reports: She is feeling more symptoms in upper back today.  Pain Scale: Omid rates pain on a scale of 0-10 to be 1 currently.    Objective     Omid received individual therapeutic exercises to develop strength, endurance, ROM, flexibility, posture and core stabilization for 60 minutes including:    Nustep 6'  Calf Stretch c/ board 3x30"  Trunk Flexion c/ ball 3-way 3 min  +Corner Stretch 3 x 30"  +Upper Trap Str 3 x 30"  +Scap Retract 15x    LTR c/ ball 3x10  DKTC c/ ball 3x10  HS Str c/ strap 3x30"  Supine Hip Flexor Str c/ strap 3x30"  Piriformis Str 3x30"  TrA Brace 2x10  Bridging 2x10    Omid received the following manual therapy techniques: Joint mobilizations and Manual traction were applied to the: Lumbar Spine for 0 minutes including:    Written Home Exercises Provided: Yes - LTR, DKTC, Hamstring, Piriformis, TrA Brace, Bridging  Pt demo good understanding of the education provided. Omid demonstrated good return demonstration of activities.     PT/PTA face to face conference performed during this session    Assessment     Patient tolerated treatment session very well. Pt with significant difficulty performing proper scapular retraction, with utilization of verbal, visual, and tactile cueing to decreased excessive upper trap activation, mild improvements at conclusion of session. Pt demonstrates improved cervical range of motion this " session due to improved tissue extensibility, decreased joint stiffness, and motor control.     This is a 57 y.o. female referred to outpatient physical therapy and presents with a medical diagnosis of Low Back Pain and demonstrates limitations as described in the problem list. Pt prognosis is Good. Pt will continue to benefit from skilled outpatient physical therapy to address the deficits listed in the problem list, provide pt/family education and to maximize pt's level of independence in the home and community environment.     Goals as follows:  Short Term GOALS: 4 weeks. Pt agrees with goals set.  1. Patient demonstrates independence with HEP.   2. Patient demonstrates independence with Postural Awareness.   3. Patient demonstrates independence with body mechanics.   4. Patient will report pain of 6/10 at worst, on 0-10 pain scale, with all activity  5. Patient demonstrates increased cervical active ROM by 10 degrees in all planes to improve tolerance to functional activities pain free.   6. Patient demonstrates increased lumbar active ROM by 10 degrees to improve tolerance to functional activities pain free.      Plan     Certification Period: 1/16/18 - 4/16/18    Continue with established Plan of Care towards PT goals.    Therapist: Mendez Sorto, PT  1/23/2018

## 2018-01-26 ENCOUNTER — CLINICAL SUPPORT (OUTPATIENT)
Dept: REHABILITATION | Facility: HOSPITAL | Age: 58
End: 2018-01-26
Attending: INTERNAL MEDICINE
Payer: MEDICARE

## 2018-01-26 DIAGNOSIS — M62.81 WEAKNESS OF TRUNK MUSCULATURE: ICD-10-CM

## 2018-01-26 DIAGNOSIS — G89.29 CHRONIC BILATERAL LOW BACK PAIN WITHOUT SCIATICA: Primary | ICD-10-CM

## 2018-01-26 DIAGNOSIS — Z74.09 DECREASED FUNCTIONAL MOBILITY AND ENDURANCE: ICD-10-CM

## 2018-01-26 DIAGNOSIS — R29.818 POOR MOTOR CONTROL OF TRUNK: ICD-10-CM

## 2018-01-26 DIAGNOSIS — M54.50 CHRONIC BILATERAL LOW BACK PAIN WITHOUT SCIATICA: Primary | ICD-10-CM

## 2018-01-26 PROCEDURE — 97110 THERAPEUTIC EXERCISES: CPT | Mod: PN

## 2018-01-26 NOTE — PROGRESS NOTES
"                                                    Physical Therapy Daily Note     Name: [facility]mid [facility] Number[facility] [id number]  Diagnosis:   Encounter Diagnoses   Name Primary?    Chronic bilateral low back pain without sciatica Yes    Poor motor control of trunk     Weakness of trunk musculature     Decreased functional mobility and endurance      Physician: Ti Hendrickson MD    Visit #: 4 of 20  DIA: 12/31/18    Time In: 705  Time Out: 800  Total Treatment Time 1:1: 60 minutes (60 min 1:1 with PT)    Precaution: Impaired Hearing    Subjective     Pt reports: She is feeling "so-so". Pain comes and goes, but lumbar pain is negligible currently.  Pain Scale: Omid rates pain on a scale of 0-10 to be 3 currently.    Objective     Omid received individual therapeutic exercises to develop strength, endurance, ROM, flexibility, posture and core stabilization for 60 minutes including:    Nustep 5'  Calf Stretch c/ board 3x30"  Trunk Flexion c/ ball 3-way 3 min  Corner Stretch 3 x 30"  Upper Trap Str 2 x 30"  Scap Retract 15x  +Shld Row c/ RTB 2x10  +Shuttle BLE Squat 2 black-cords 3x10    LTR c/ ball and UE reach 2x10 ea  DKTC c/ ball and BUE reach 3x10  HS Str c/ strap 3x30"  Supine Hip Flexor Str c/ strap 2x30"  Piriformis Str 2x30"  TrA Brace 2x10  Bridging 2x10    Omid received the following manual therapy techniques: Joint mobilizations and Manual traction were applied to the: Lumbar Spine for 0 minutes including:    Written Home Exercises Provided: Yes - LTR, DKTC, Hamstring, Piriformis, TrA Brace, Bridging  Pt demo good understanding of the education provided. Omid demonstrated good return demonstration of activities.     PT/PTA face to face conference performed during this session    Assessment     Patient tolerated treatment session very well. Pt with good response to progression of resistance training, without adverse effects or symptom provocation. Cueing provided for proper scapular " retraction and decreased compensatory trunk/lumbar extension movement pattern, mild improvement noted. Pt prepared for increased core and UE/LE strengthening and flexibility training.    This is a 57 y.o. female referred to outpatient physical therapy and presents with a medical diagnosis of Low Back Pain and demonstrates limitations as described in the problem list. Pt prognosis is Good. Pt will continue to benefit from skilled outpatient physical therapy to address the deficits listed in the problem list, provide pt/family education and to maximize pt's level of independence in the home and community environment.     Goals as follows:  Short Term GOALS: 4 weeks. Pt agrees with goals set.  1. Patient demonstrates independence with HEP.   2. Patient demonstrates independence with Postural Awareness.   3. Patient demonstrates independence with body mechanics.   4. Patient will report pain of 6/10 at worst, on 0-10 pain scale, with all activity  5. Patient demonstrates increased cervical active ROM by 10 degrees in all planes to improve tolerance to functional activities pain free.   6. Patient demonstrates increased lumbar active ROM by 10 degrees to improve tolerance to functional activities pain free.      Plan     Certification Period: 1/16/18 - 4/16/18    Continue with established Plan of Care towards PT goals.    Therapist: Mendez Sorto, PT  1/26/2018

## 2018-01-29 ENCOUNTER — CLINICAL SUPPORT (OUTPATIENT)
Dept: REHABILITATION | Facility: HOSPITAL | Age: 58
End: 2018-01-29
Attending: INTERNAL MEDICINE
Payer: MEDICARE

## 2018-01-29 DIAGNOSIS — G89.29 CHRONIC BILATERAL LOW BACK PAIN WITHOUT SCIATICA: Primary | ICD-10-CM

## 2018-01-29 DIAGNOSIS — M54.50 CHRONIC BILATERAL LOW BACK PAIN WITHOUT SCIATICA: Primary | ICD-10-CM

## 2018-01-29 DIAGNOSIS — M62.81 WEAKNESS OF TRUNK MUSCULATURE: ICD-10-CM

## 2018-01-29 DIAGNOSIS — Z74.09 DECREASED FUNCTIONAL MOBILITY AND ENDURANCE: ICD-10-CM

## 2018-01-29 DIAGNOSIS — R29.818 POOR MOTOR CONTROL OF TRUNK: ICD-10-CM

## 2018-01-29 PROCEDURE — 97110 THERAPEUTIC EXERCISES: CPT | Mod: PN

## 2018-01-29 NOTE — PROGRESS NOTES
"                                                    Physical Therapy Daily Note     Name: Omid Macias [facility] Number: [id number]  Diagnosis:   Encounter Diagnoses   Name Primary?    Chronic bilateral low back pain without sciatica Yes    Poor motor control of trunk     Weakness of trunk musculature     Decreased functional mobility and endurance      Physician: Ti Hendrickson MD    Visit #: 5 of 20  DIA: 12/31/18    Time In: 0720 (patient 20 minutes late)  Time Out: 0810  Total Treatment Time 1:1: 50 minutes (1:1 with PTA 40 minutes of treatment session)     Precaution: Impaired Hearing    Subjective     Pt reports: Omid states her ride was late picking her up this morning so she feels a little stressed. Patient states her back is just sore this morning.   Pain Scale: Omid rates pain on a scale of 0-10 to be 3 currently.    Objective     Omid received individual therapeutic exercises to develop strength, endurance, ROM, flexibility, posture and core stabilization for 60 minutes including:    Nustep 5'  Calf Stretch c/ board 3x30"  Trunk Flexion c/ ball 3-way x 3 min  Corner Stretch 3 x 30"  Upper Trap Str 2 x 30"  Scap Retract 2x10  Shld Row c/ GTB 2x10  Shuttle BLE Squat 2 black-cords 3x10    LTR c/ ball and UE reach 2x10 ea  DKTC c/ ball and BUE reach 3x10  +Seated hamstring stretch with reach and sweep 15x ea   +Prone hip flexor stretch 20 sec x 3 ea   Piriformis Str 2x30"  TrA Brace 2x10  +Brace with marching x 2 minutes   +Brace with SLR 2x10 ea   Bridging 2x10    Omid received the following manual therapy techniques: Joint mobilizations and Manual traction were applied to the: Lumbar Spine for 0 minutes including:    Written Home Exercises Provided: Yes - LTR, DKTC, Hamstring, Piriformis, TrA Brace, Bridging  Pt demo good understanding of the education provided. Omid demonstrated good return demonstration of activities.     PT/PTA face to face conference performed during this " session    Assessment     Omid tolerated treatment well today. Patient able to progress core stabilization exercises today without complaints of pain with cues needed to ensure proper exercise form and muscle recruitment. Patient demonstrates good tolerance to mobility specific exercises today with no difficulty noted with supine<>prone transition. Patient demonstrates positive response to therapeutic exercise with reduction in subjective pain reports noted by end of treatment session.     This is a 57 y.o. female referred to outpatient physical therapy and presents with a medical diagnosis of Low Back Pain and demonstrates limitations as described in the problem list. Pt prognosis is Good. Pt will continue to benefit from skilled outpatient physical therapy to address the deficits listed in the problem list, provide pt/family education and to maximize pt's level of independence in the home and community environment.     Goals as follows:  Short Term GOALS: 4 weeks. Pt agrees with goals set.  1. Patient demonstrates independence with HEP.   2. Patient demonstrates independence with Postural Awareness.   3. Patient demonstrates independence with body mechanics.   4. Patient will report pain of 6/10 at worst, on 0-10 pain scale, with all activity  5. Patient demonstrates increased cervical active ROM by 10 degrees in all planes to improve tolerance to functional activities pain free.   6. Patient demonstrates increased lumbar active ROM by 10 degrees to improve tolerance to functional activities pain free.      Plan     Certification Period: 1/16/18 - 4/16/18    Continue with established Plan of Care towards PT goals.    Therapist: Kaitlin Bledsoe, PTA  1/29/2018

## 2018-02-02 ENCOUNTER — CLINICAL SUPPORT (OUTPATIENT)
Dept: REHABILITATION | Facility: HOSPITAL | Age: 58
End: 2018-02-02
Attending: INTERNAL MEDICINE
Payer: MEDICARE

## 2018-02-02 DIAGNOSIS — R29.818 POOR MOTOR CONTROL OF TRUNK: ICD-10-CM

## 2018-02-02 DIAGNOSIS — M62.81 WEAKNESS OF TRUNK MUSCULATURE: ICD-10-CM

## 2018-02-02 DIAGNOSIS — M54.50 CHRONIC BILATERAL LOW BACK PAIN WITHOUT SCIATICA: Primary | ICD-10-CM

## 2018-02-02 DIAGNOSIS — G89.29 CHRONIC BILATERAL LOW BACK PAIN WITHOUT SCIATICA: Primary | ICD-10-CM

## 2018-02-02 DIAGNOSIS — Z74.09 DECREASED FUNCTIONAL MOBILITY AND ENDURANCE: ICD-10-CM

## 2018-02-02 PROCEDURE — 97110 THERAPEUTIC EXERCISES: CPT | Mod: PN

## 2018-02-02 NOTE — PROGRESS NOTES
"                                                    Physical Therapy Daily Note     Name: Omid Macias [facility] Number: [id number]  Diagnosis:   Encounter Diagnoses   Name Primary?    Chronic bilateral low back pain without sciatica Yes    Poor motor control of trunk     Weakness of trunk musculature     Decreased functional mobility and endurance      Physician: Ti Hendrickson MD    Visit #: 5 of 20  DIA: 12/31/18    Time In: 0730  Time Out: 0840  Total Treatment Time 1:1: 70 minutes (1:1 with PT 30 minutes of treatment session)     Precaution: Impaired Hearing    Subjective     Pt reports: Omid states her upper back/shoulders, low back is feeling better.  Pain Scale: Omid rates pain on a scale of 0-10 to be 4 currently.    Objective     Omid received individual therapeutic exercises to develop strength, endurance, ROM, flexibility, posture and core stabilization for 60 minutes including:    Nustep 5'  Calf Stretch c/ board 3x30"  Trunk Flexion c/ ball 3-way x 3 min  Corner Stretch 3 x 30"  Upper Trap Str 2 x 30"  Scap Retract 2x10  Shld Row c/ GTB 2x10  Shuttle BLE Squat 2 black-cords 3x10    LTR c/ ball and UE reach 2x10 ea  DKTC c/ ball and BUE reach 3x10  Seated hamstring stretch with reach and sweep 15x ea   Prone hip flexor stretch 20 sec x 3 ea   Piriformis Str 2x30"  TrA Brace 2x10  Brace with marching x 2 minutes  Brace with SLR 2x10 ea   Bridging 2x10    Omid received the following manual therapy techniques: Joint mobilizations and Manual traction were applied to the: Lumbar Spine for 0 minutes including:    Written Home Exercises Provided: Yes - LTR, DKTC, Hamstring, Piriformis, TrA Brace, Bridging  Pt demo good understanding of the education provided. Omid demonstrated good return demonstration of activities.     PT/PTA face to face conference performed during this session    Assessment     Omid tolerated treatment well today. Pt with improving thoracolumbar ROM noted " throughout treatment compared to previous sessions, with no subjective reports of symptom provocation or objective pain signs noted. Pt with good tolerance to strength training activities and is prepared for progression to increased closed chain activities next session.    This is a 57 y.o. female referred to outpatient physical therapy and presents with a medical diagnosis of Low Back Pain and demonstrates limitations as described in the problem list. Pt prognosis is Good. Pt will continue to benefit from skilled outpatient physical therapy to address the deficits listed in the problem list, provide pt/family education and to maximize pt's level of independence in the home and community environment.     Goals as follows:  Short Term GOALS: 4 weeks. Pt agrees with goals set.  1. Patient demonstrates independence with HEP.   2. Patient demonstrates independence with Postural Awareness.   3. Patient demonstrates independence with body mechanics.   4. Patient will report pain of 6/10 at worst, on 0-10 pain scale, with all activity  5. Patient demonstrates increased cervical active ROM by 10 degrees in all planes to improve tolerance to functional activities pain free.   6. Patient demonstrates increased lumbar active ROM by 10 degrees to improve tolerance to functional activities pain free.      Plan     Certification Period: 1/16/18 - 4/16/18    Continue with established Plan of Care towards PT goals.    Therapist: Mendez Sorto, PT  2/2/2018

## 2018-02-08 ENCOUNTER — CLINICAL SUPPORT (OUTPATIENT)
Dept: REHABILITATION | Facility: HOSPITAL | Age: 58
End: 2018-02-08
Attending: INTERNAL MEDICINE
Payer: MEDICARE

## 2018-02-08 DIAGNOSIS — R29.818 POOR MOTOR CONTROL OF TRUNK: ICD-10-CM

## 2018-02-08 DIAGNOSIS — M62.81 WEAKNESS OF TRUNK MUSCULATURE: ICD-10-CM

## 2018-02-08 DIAGNOSIS — G89.29 CHRONIC BILATERAL LOW BACK PAIN WITHOUT SCIATICA: Primary | ICD-10-CM

## 2018-02-08 DIAGNOSIS — Z74.09 DECREASED FUNCTIONAL MOBILITY AND ENDURANCE: ICD-10-CM

## 2018-02-08 DIAGNOSIS — M54.50 CHRONIC BILATERAL LOW BACK PAIN WITHOUT SCIATICA: Primary | ICD-10-CM

## 2018-02-08 PROCEDURE — G8979 MOBILITY GOAL STATUS: HCPCS | Mod: CK,PN

## 2018-02-08 PROCEDURE — 97110 THERAPEUTIC EXERCISES: CPT | Mod: PN

## 2018-02-08 PROCEDURE — G8978 MOBILITY CURRENT STATUS: HCPCS | Mod: CK,PN

## 2018-02-08 NOTE — PROGRESS NOTES
"                                                    Physical Therapy Progress Note     Name: Omid Macias [facility] Number: [id number]  Diagnosis:   Encounter Diagnoses   Name Primary?    Chronic bilateral low back pain without sciatica Yes    Poor motor control of trunk     Weakness of trunk musculature     Decreased functional mobility and endurance      Physician: Ti Hendrickson MD    Visit #: 6 of 20  DIA: 12/31/18    Time In: 700  Time Out: 810  Total Treatment Time 1:1: 70 minutes (1:1 with PT 60 minutes of treatment session; 10 min heat)     Precaution: Impaired Hearing  FOTO/G-code performed: 2/8/18 (visit #6)    Subjective     Pt reports: Omid has some good days, and some bad days. States she has improved minimally.  Pain Scale: Omid rates pain on a scale of 0-10 to be 4 currently.    Objective     CERVICAL SPINE AROM:   Flexion: 30    Extension: 20    Left Sidebend: 30   Right Sidebend: 40    Left Rotation: 40   Right Rotation: 40      LUMBAR SPINE AROM:   Flexion: 0/0 - 10   Extension: 30/10 - 20   Left Sidebend: 15   Right Sidebend: 20   Left Rotation: WFL   Right Rotation: Decreased     Omid received individual therapeutic exercises to develop strength, endurance, ROM, flexibility, posture and core stabilization for 60 minutes including:    Nustep 5'  Calf Stretch c/ board 3x30"  Trunk Flexion c/ ball 3-way x 3 min  Corner Stretch 3 x 30"  Upper Trap Str 2 x 30"  Scap Retract 2x10  Shld Row c/ GTB 2x10  Shuttle BLE Squat 2 black-cords 3x10  Seated hamstring stretch with reach and sweep 15x ea     LTR c/ ball and UE reach 2x10 ea  DKTC c/ ball and BUE reach 3x10  Prone hip flexor stretch 20 sec x 3 ea   Piriformis Str 2x30"  TrA Brace 2x10  Brace with marching x 2 minutes  Brace with SLR 2x10 ea   Bridging 2x10  +SL Clam c/ GTB 2x10 ea    Omid received the following manual therapy techniques: Joint mobilizations and Manual traction were applied to the: Lumbar Spine for 0 minutes " including:    Written Home Exercises Provided: Yes - LTR, DKTC, Hamstring, Piriformis, TrA Brace, Bridging  Pt demo good understanding of the education provided. Omid demonstrated good return demonstration of activities.     PT/PTA face to face conference performed during this session    Assessment     Omid tolerated treatment well today, with progress note performed. Pt achieved 3/6 short term goals during episode of care, with deficits remaining in postural awareness, body mechanics, and subjective pain ratings. Pt with improved cervical and thoracolumbar ROM noted, however aberrant movement pattern remains with significant cueing for proper isolated plane of motion to be tested. Pt demonstrates significantly greater trunk flexion during therex program compared to ROM testing due to increased fear of pain provocation with forward bending from standing position.    This is a 57 y.o. female referred to outpatient physical therapy and presents with a medical diagnosis of Low Back Pain and demonstrates limitations as described in the problem list. Pt prognosis is Good. Pt will continue to benefit from skilled outpatient physical therapy to address the deficits listed in the problem list, provide pt/family education and to maximize pt's level of independence in the home and community environment.     Goals as follows:  Short Term GOALS: 4 weeks. Pt agrees with goals set.  1. Patient demonstrates independence with HEP. Met, continue  2. Patient demonstrates independence with Postural Awareness. Not met, continue  3. Patient demonstrates independence with body mechanics. Not met continue  4. Patient will report pain of 6/10 at worst, on 0-10 pain scale, with all activity not met continue  5. Patient demonstrates increased cervical active ROM by 10 degrees in all planes to improve tolerance to functional activities pain free. met  6. Patient demonstrates increased lumbar active ROM by 10 degrees to improve tolerance  to functional activities pain free. met       Functional Limitations Reports - G Codes  Category: Mobility  Tool: FOTO Lumbar Survey  Score: 48% Limitation   Modifier  Impairment Limitation Restriction    CH  0 % impaired, limited or restricted    CI  @ least 1% but less than 20% impaired, limited or restricted    CJ  @ least 20%<40% impaired, limited or restricted    CK  @ least 40%<60% impaired, limited or restricted    CL  @ least 60% <80% impaired, limited or restricted    CM  @ least 80%<100% impaired limited or restricted    CN  100% impaired, limited or restricted     Current/: CK = 40-60% Limitation   Goal/ : CK = 40-60% Limitation    Functional Limitations Reports - G Codes  Category: Mobility  Tool: FOTO Cervical Survey  Score: 41% Limitation   Modifier  Impairment Limitation Restriction    CH  0 % impaired, limited or restricted    CI  @ least 1% but less than 20% impaired, limited or restricted    CJ  @ least 20%<40% impaired, limited or restricted    CK  @ least 40%<60% impaired, limited or restricted    CL  @ least 60% <80% impaired, limited or restricted    CM  @ least 80%<100% impaired limited or restricted    CN  100% impaired, limited or restricted     Current/: CK = 40-60% Limitation   Goal/ : CK = 40-60% Limitation    Plan     Certification Period: 1/16/18 - 4/16/18    Continue with established Plan of Care towards PT goals.    Therapist: Mendez Sorto, PT  2/8/2018

## 2018-02-14 ENCOUNTER — PATIENT OUTREACH (OUTPATIENT)
Dept: OTHER | Facility: OTHER | Age: 58
End: 2018-02-14

## 2018-02-14 NOTE — PROGRESS NOTES
Last 5 Patient Entered Readings                                      Current 30 Day Average: 113/72     Recent Readings 2/10/2018 2/10/2018 2/6/2018 2/6/2018 2/3/2018    SBP (mmHg) 110 110 112 112 112    DBP (mmHg) 73 73 69 69 74    Pulse - 74 - 70 -          Hypertension Digital Medicine (HDMP) Health  Follow Up    LVM to follow up with Mrs. Omid Macias Abiodun.    Per 30 day average, blood pressure is well controlled 113/72 mmHg. Encouraged adherence to low sodium diet and physical activity guidelines. Advised patient to call or message with questions or concerns.

## 2018-02-19 ENCOUNTER — CLINICAL SUPPORT (OUTPATIENT)
Dept: REHABILITATION | Facility: HOSPITAL | Age: 58
End: 2018-02-19
Attending: INTERNAL MEDICINE
Payer: MEDICARE

## 2018-02-19 DIAGNOSIS — G89.29 CHRONIC BILATERAL LOW BACK PAIN WITHOUT SCIATICA: Primary | ICD-10-CM

## 2018-02-19 DIAGNOSIS — Z74.09 DECREASED FUNCTIONAL MOBILITY AND ENDURANCE: ICD-10-CM

## 2018-02-19 DIAGNOSIS — M62.81 WEAKNESS OF TRUNK MUSCULATURE: ICD-10-CM

## 2018-02-19 DIAGNOSIS — M54.50 CHRONIC BILATERAL LOW BACK PAIN WITHOUT SCIATICA: Primary | ICD-10-CM

## 2018-02-19 DIAGNOSIS — R29.818 POOR MOTOR CONTROL OF TRUNK: ICD-10-CM

## 2018-02-19 PROCEDURE — 97110 THERAPEUTIC EXERCISES: CPT | Mod: PN

## 2018-02-19 NOTE — PROGRESS NOTES
"                                                    Physical Therapy Progress Note     Name: Omid Macias [facility] Number: [id number]  Diagnosis:   Encounter Diagnoses   Name Primary?    Chronic bilateral low back pain without sciatica Yes    Poor motor control of trunk     Weakness of trunk musculature     Decreased functional mobility and endurance      Physician: Ti Hendrickson MD    Visit #: 7 of 20  DIA: 12/31/18    Time In: 0700  Time Out: 0805  Total Treatment Time 1:1: 65 minutes (1:1 with PTA 30 minutes of treatment session; 10 min heat)     Precaution: Impaired Hearing  FOTO/G-code performed: 2/8/18 (visit #6)    Subjective     Pt reports: Omid states her lower back isn't painful but it is sore. Patient states she did perform her exercises at home on Saturday.   Pain Scale: Omid rates pain on a scale of 0-10 to be 0 currently.    Objective     Omid received individual therapeutic exercises to develop strength, endurance, ROM, flexibility, posture and core stabilization for 60 minutes including:    Nustep x 7 minutes   Calf Stretch c/ board 3x30"  Trunk Flexion c/ ball 3-way x 3 min  Corner Stretch 3 x 30"  Upper Trap Str 2 x 30"  Scap Retract 2x10  Shld Row c/ GTB 2x10  Shuttle BLE Squat 2 black-cords 3x10  Seated hamstring stretch with reach and sweep 15x ea     LTR c/ ball and UE reach 2x10 ea  DKTC c/ ball and BUE reach 3x10  Prone hip flexor stretch 20 sec x 3 ea   Piriformis Str 2x30"  TrA Brace 2x10  Brace with marching x 2 minutes  Brace with SLR 2x10 ea   Bridging 2x10  SL Clam c/ GTB 2x10 ea    Omid received the following manual therapy techniques: Joint mobilizations and Manual traction were applied to the: Lumbar Spine for 0 minutes including:    Written Home Exercises Provided: Yes - LTR, DKTC, Hamstring, Piriformis, TrA Brace, Bridging  Pt demo good understanding of the education provided. Omid demonstrated good return demonstration of activities.     PT/PTA face to " face conference performed during this session    Assessment     Omid tolerated treatment well today. Patient requires cueing throughout treatment session to perform exercises properly and to the best of her ability. Patient able to complete all exercises without complaints of increased lower back pain with training effect easily achieved in bilateral hip musculature. Patient does not appear to be limited by pain during treatment sessions; however, exercises are performed slowly, with guarded movement patterns.     This is a 57 y.o. female referred to outpatient physical therapy and presents with a medical diagnosis of Low Back Pain and demonstrates limitations as described in the problem list. Pt prognosis is Good. Pt will continue to benefit from skilled outpatient physical therapy to address the deficits listed in the problem list, provide pt/family education and to maximize pt's level of independence in the home and community environment.     Goals as follows:  Short Term GOALS: 4 weeks. Pt agrees with goals set.  1. Patient demonstrates independence with HEP. Met, continue  2. Patient demonstrates independence with Postural Awareness. Not met, continue  3. Patient demonstrates independence with body mechanics. Not met continue  4. Patient will report pain of 6/10 at worst, on 0-10 pain scale, with all activity not met continue  5. Patient demonstrates increased cervical active ROM by 10 degrees in all planes to improve tolerance to functional activities pain free. met  6. Patient demonstrates increased lumbar active ROM by 10 degrees to improve tolerance to functional activities pain free. met       Current/: CK = 40-60% Limitation   Goal/ : CK = 40-60% Limitation    Plan     Certification Period: 1/16/18 - 4/16/18    Continue with established Plan of Care towards PT goals.    Therapist: Kaitlin Bledsoe, PTA  2/19/2018

## 2018-02-23 ENCOUNTER — CLINICAL SUPPORT (OUTPATIENT)
Dept: REHABILITATION | Facility: HOSPITAL | Age: 58
End: 2018-02-23
Attending: INTERNAL MEDICINE
Payer: MEDICARE

## 2018-02-23 DIAGNOSIS — G89.29 CHRONIC BILATERAL LOW BACK PAIN WITHOUT SCIATICA: Primary | ICD-10-CM

## 2018-02-23 DIAGNOSIS — Z74.09 DECREASED FUNCTIONAL MOBILITY AND ENDURANCE: ICD-10-CM

## 2018-02-23 DIAGNOSIS — M62.81 WEAKNESS OF TRUNK MUSCULATURE: ICD-10-CM

## 2018-02-23 DIAGNOSIS — R29.818 POOR MOTOR CONTROL OF TRUNK: ICD-10-CM

## 2018-02-23 DIAGNOSIS — M54.50 CHRONIC BILATERAL LOW BACK PAIN WITHOUT SCIATICA: Primary | ICD-10-CM

## 2018-02-23 PROCEDURE — 97110 THERAPEUTIC EXERCISES: CPT | Mod: PN

## 2018-02-23 NOTE — PROGRESS NOTES
"                                                    Physical Therapy Progress Note     Name: Omid Macias [facility] Number: [id number]  Diagnosis:   Encounter Diagnoses   Name Primary?    Chronic bilateral low back pain without sciatica Yes    Poor motor control of trunk     Weakness of trunk musculature     Decreased functional mobility and endurance      Physician: Ti Hendrickson MD    Visit #: 8 of 20  DIA: 12/31/18    Time In: 0700  Time Out: 0810  Total Treatment Time 1:1: 70 minutes (1:1 with PT 60 minutes of treatment session; 10 min heat)     Precaution: Impaired Hearing  FOTO/G-code performed: 2/8/18 (visit #6)    Subjective     Pt reports: Omid denies pain, only experiencing soreness. States she is "always on her feet".  Pain Scale: Omid rates pain on a scale of 0-10 to be 0 currently.    Objective     Omid received individual therapeutic exercises to develop strength, endurance, ROM, flexibility, posture and core stabilization for 60 minutes including:    Treadmill x 7 minutes 1.5 mph  Calf Stretch c/ board 3x30"  Trunk Flexion c/ ball 3-way x 3 min  Corner Stretch 3 x 30"  Upper Trap Str 2 x 30"  Scap Retract 2x10  Shld Row c/ GTB 2x10  Shuttle BLE Squat 2 black-cords 3x10  Seated hamstring stretch with reach and sweep 15x ea   +Wall Squat c/ ball 2x10  +Anti-Rotation c/ GTB 2x10    LTR c/ ball and UE reach 2x10 ea  DKTC c/ ball and BUE reach 3x10  Prone hip flexor stretch 20 sec x 3 ea   Piriformis Str 2x30"  TrA Brace 2x10  Brace with marching x 2 minutes  Brace with SLR 2x10 ea   Bridging 2x10  SL Clam c/ GTB 2x10 ea    +Healthy Back Circuit:  Knee Ext Matrix 10# 2x10  Knee Curl Matrix 25# 2x10    Omid received the following manual therapy techniques: Joint mobilizations and Manual traction were applied to the: Lumbar Spine for 0 minutes including:    Written Home Exercises Provided: Yes - LTR, DKTC, Hamstring, Piriformis, TrA Brace, Bridging  Pt demo good understanding of the " education provided. Omid demonstrated good return demonstration of activities.     PT/PTA face to face conference performed during this session    Assessment     Omid tolerated treatment well today. Pt requires cueing throughout treatment session to perform exercises properly, likely related to decreased compliance with HEP and/or difficulty with comprehension/motor planning of activities. Pt with significantly altered gait pattern upon performing gait training on treadmill compared to stable floor surface, decreased stride length, increased step height with steppage quality gait pattern. Good introduction to machine resistance training without adverse effect, continue to transition to Healthy Back circuit in preparation for discharge in future and enrollment in local gym.    This is a 57 y.o. female referred to outpatient physical therapy and presents with a medical diagnosis of Low Back Pain and demonstrates limitations as described in the problem list. Pt prognosis is Good. Pt will continue to benefit from skilled outpatient physical therapy to address the deficits listed in the problem list, provide pt/family education and to maximize pt's level of independence in the home and community environment.     Goals as follows:  Short Term GOALS: 4 weeks. Pt agrees with goals set.  1. Patient demonstrates independence with HEP. Met, continue  2. Patient demonstrates independence with Postural Awareness. Not met, continue  3. Patient demonstrates independence with body mechanics. Not met continue  4. Patient will report pain of 6/10 at worst, on 0-10 pain scale, with all activity not met continue  5. Patient demonstrates increased cervical active ROM by 10 degrees in all planes to improve tolerance to functional activities pain free. met  6. Patient demonstrates increased lumbar active ROM by 10 degrees to improve tolerance to functional activities pain free. met       Current/: CK = 40-60% Limitation   Goal/  : CK = 40-60% Limitation    Plan     Certification Period: 1/16/18 - 4/16/18    Continue with established Plan of Care towards PT goals.    Therapist: Mendez Sorto, PT  2/23/2018

## 2018-02-26 ENCOUNTER — CLINICAL SUPPORT (OUTPATIENT)
Dept: REHABILITATION | Facility: HOSPITAL | Age: 58
End: 2018-02-26
Attending: INTERNAL MEDICINE
Payer: MEDICARE

## 2018-02-26 ENCOUNTER — PATIENT MESSAGE (OUTPATIENT)
Dept: OBSTETRICS AND GYNECOLOGY | Facility: CLINIC | Age: 58
End: 2018-02-26

## 2018-02-26 ENCOUNTER — TELEPHONE (OUTPATIENT)
Dept: OBSTETRICS AND GYNECOLOGY | Facility: CLINIC | Age: 58
End: 2018-02-26

## 2018-02-26 DIAGNOSIS — Z12.39 SCREENING FOR BREAST CANCER: Primary | ICD-10-CM

## 2018-02-26 DIAGNOSIS — G89.29 CHRONIC BILATERAL LOW BACK PAIN WITHOUT SCIATICA: Primary | ICD-10-CM

## 2018-02-26 DIAGNOSIS — R29.818 POOR MOTOR CONTROL OF TRUNK: ICD-10-CM

## 2018-02-26 DIAGNOSIS — M54.50 CHRONIC BILATERAL LOW BACK PAIN WITHOUT SCIATICA: Primary | ICD-10-CM

## 2018-02-26 DIAGNOSIS — Z74.09 DECREASED FUNCTIONAL MOBILITY AND ENDURANCE: ICD-10-CM

## 2018-02-26 DIAGNOSIS — M62.81 WEAKNESS OF TRUNK MUSCULATURE: ICD-10-CM

## 2018-02-26 PROCEDURE — 97110 THERAPEUTIC EXERCISES: CPT | Mod: PN

## 2018-02-26 NOTE — TELEPHONE ENCOUNTER
----- Message from Elviar Maldonado sent at 2/26/2018 12:51 PM CST -----  Contact: pt  x_ 1st Request  _ 2nd Request  _ 3rd Request    Who: pt    Why: mmg order request. Pt states to please leave message on her [employer] confirming the order has been placed    What Number to Call Back: [phone]    When to Expect a call back: (Before the end of the day)  -- if call after 3:00 call back will be tomorrow.

## 2018-02-26 NOTE — PROGRESS NOTES
"                                                    Physical Therapy Progress Note     Name: Omid Macias [facility] Number: [id number]  Diagnosis:   Encounter Diagnoses   Name Primary?    Chronic bilateral low back pain without sciatica Yes    Poor motor control of trunk     Weakness of trunk musculature     Decreased functional mobility and endurance      Physician: Ti Hendrickson MD    Visit #: 9 of 20  DIA: 12/31/18    Time In: 0655  Time Out: 0805  Total Treatment Time: 70 minutes (1:1 with PTA 55 minutes of treatment session; 10 min heat)     Precaution: Impaired Hearing  FOTO/G-code performed: 2/8/18 (visit #6)    Subjective     Pt reports: Omid states her lower back as been pain free for the last 2 weeks. Patient states her legs don't feel 100% better and they tend to feel a little tired at times.   Pain Scale: Omid rates pain on a scale of 0-10 to be 0 currently.    Objective     Omid received individual therapeutic exercises to develop strength, endurance, ROM, flexibility, posture and core stabilization for 60 minutes including:    Treadmill x 7 minutes 1.5 mph  Calf Stretch c/ board 3x30"  Trunk Flexion c/ ball 3-way x 3 min  Seated hamstring stretch with reach and sweep 15x ea   Corner Stretch 3 x 30"  Upper Trap Str 2 x 30"  Shld Row c/ GTB 2x10  Shuttle BLE Squat 2 black-cords 3x10  Wall Squat c/ ball 2x10  Anti-Rotation c/ GTB 2x10    LTR c/ ball and UE reach 2x10 ea  DKTC c/ ball and BUE reach 3x10  Prone hip flexor stretch 20 sec x 3 ea   Piriformis Str 2x30"  TrA Brace 2x10  Brace with marching x 2 minutes  Brace with SLR 2x10 ea   Bridging 2x10  SL Clam c/ GTB 2x10 ea    Healthy Back Circuit:  Knee Ext Matrix 10# 2x10  Knee Curl Matrix 30# 2x10  +Hip abduction Matrix 40# 2x10    Omid received the following manual therapy techniques: Joint mobilizations and Manual traction were applied to the: Lumbar Spine for 0 minutes including:    Written Home Exercises Provided: Yes - " LTR, DKTC, Hamstring, Piriformis, TrA Brace, Bridging  Pt demo good understanding of the education provided. Omid demonstrated good return demonstration of activities.     PT/PTA face to face conference performed during this session    Assessment     Omid tolerated treatment well today. Patient requires heavy cues when performing standing anti-rotations to achieve core activation in order to avoid trunk rotation. Patient demonstrates fair<>poor awareness of BLE positioning and exercise form often requiring tactile and verbal cues throughout session to ensure proper exercise form. Patient able to perform all exercises without complaints of lower back pain with very good tolerance to resistance training on matrix machines observed today.     This is a 57 y.o. female referred to outpatient physical therapy and presents with a medical diagnosis of Low Back Pain and demonstrates limitations as described in the problem list. Pt prognosis is Good. Pt will continue to benefit from skilled outpatient physical therapy to address the deficits listed in the problem list, provide pt/family education and to maximize pt's level of independence in the home and community environment.     Goals as follows:  Short Term GOALS: 4 weeks. Pt agrees with goals set.  1. Patient demonstrates independence with HEP. Met, continue  2. Patient demonstrates independence with Postural Awareness. Not met, continue  3. Patient demonstrates independence with body mechanics. Not met continue  4. Patient will report pain of 6/10 at worst, on 0-10 pain scale, with all activity not met continue  5. Patient demonstrates increased cervical active ROM by 10 degrees in all planes to improve tolerance to functional activities pain free. met  6. Patient demonstrates increased lumbar active ROM by 10 degrees to improve tolerance to functional activities pain free. met       Current/: CK = 40-60% Limitation   Goal/ : CK = 40-60%  Limitation    Plan     Certification Period: 1/16/18 - 4/16/18    Continue with established Plan of Care towards PT goals.    Therapist: Kaitlin Bledsoe, PTA  2/26/2018

## 2018-03-02 ENCOUNTER — CLINICAL SUPPORT (OUTPATIENT)
Dept: REHABILITATION | Facility: HOSPITAL | Age: 58
End: 2018-03-02
Attending: INTERNAL MEDICINE
Payer: MEDICARE

## 2018-03-02 DIAGNOSIS — G89.29 CHRONIC BILATERAL LOW BACK PAIN WITHOUT SCIATICA: Primary | ICD-10-CM

## 2018-03-02 DIAGNOSIS — M54.50 CHRONIC BILATERAL LOW BACK PAIN WITHOUT SCIATICA: Primary | ICD-10-CM

## 2018-03-02 DIAGNOSIS — M62.81 WEAKNESS OF TRUNK MUSCULATURE: ICD-10-CM

## 2018-03-02 DIAGNOSIS — Z74.09 DECREASED FUNCTIONAL MOBILITY AND ENDURANCE: ICD-10-CM

## 2018-03-02 DIAGNOSIS — R29.818 POOR MOTOR CONTROL OF TRUNK: ICD-10-CM

## 2018-03-02 PROCEDURE — 97110 THERAPEUTIC EXERCISES: CPT | Mod: PN

## 2018-03-02 PROCEDURE — G8979 MOBILITY GOAL STATUS: HCPCS | Mod: CK,PN

## 2018-03-02 PROCEDURE — G8978 MOBILITY CURRENT STATUS: HCPCS | Mod: CK,PN

## 2018-03-02 NOTE — PROGRESS NOTES
"                                                    Physical Therapy Progress Note     Name: Omid Macias [place]  Clinic Number: [id number]  Diagnosis:   Encounter Diagnoses   Name Primary?    Chronic bilateral low back pain without sciatica Yes    Poor motor control of trunk     Weakness of trunk musculature     Decreased functional mobility and endurance      Physician: Ti Hendrickson MD    Visit #: 10 of 20  DIA: 12/31/18    Time In: 915  Time Out: 1015  Total Treatment Time: 60 minutes (1:1 with PT 60 mins)    Precaution: Impaired Hearing  FOTO/G-code performed: 3/2/18 (visit #10)    Subjective     Pt reports: Omid continues to be pain-free with ADL's and household/clinic distance ambulation. She is prepared to take break from PT and monitor symptoms independently, will return to clinic if needed.  Pain Scale: Omid rates pain on a scale of 0-10 to be 0 currently.    Objective     CERVICAL SPINE AROM:   Flexion: 45   Extension: 50    Left Sidebend: 45   Right Sidebend: 45    Left Rotation: 60   Right Rotation: 50      LUMBAR SPINE AROM:   Flexion: 45/20 - 25   Extension: 35/10 - 25   Left Sidebend: 20   Right Sidebend: 20   Left Rotation: WFL   Right Rotation: WFL     LOWER EXTREMITY STRENGTH:    Left Right   Quadriceps 4/5 4/5   Hamstrings 4-/5 4-/5      Iliopsoas 4-/5 4-/5   PGM 4-/5 4-/5   Hip IR 4-/5 4-/5   Hip ER 4-/5 4-/5   Hip Ext 3+/5 3+/5   Ankle DF 4-/5 4-/5   Ankle PF 4-/5 4-/5          Omid received individual therapeutic exercises to develop strength, endurance, ROM, flexibility, posture and core stabilization for 60 minutes including:      Nustep 6'   Calf Stretch c/ board 3x30"  Trunk Flexion c/ ball 3-way x 3 min  Seated hamstring stretch with reach and sweep 15x ea   Corner Stretch 3 x 30"  Upper Trap Str 2 x 30"    Wall Squat c/ ball 2x10  Prone hip flexor stretch 20 sec x 3 ea     Healthy Back Circuit:  Seated Row 15# 2x10  Leg Press 15# 2x10  Knee Ext Matrix 10# 2x10  Knee Curl " Matrix 30# 2x10  +Hip abduction Matrix 40# 2x10    Omid received the following manual therapy techniques: Joint mobilizations and Manual traction were applied to the: Lumbar Spine for 0 minutes including:    Written Home Exercises Provided: Yes - Clam, SLR, Bridge, Row, TrA Brace  Pt demo good understanding of the education provided. Omid demonstrated good return demonstration of activities.     PT/PTA face to face conference performed during this session    Assessment     Omid tolerated treatment well today, with progress note performed. Pt has achieved 4/6 short term goals and 4/6 long term goals during this episode of care. Pt with significantly improved lumbar and cervical ROM, with decreased symptom provocation throughout. Pt with slight improvement in BLE strength during episode, however continues to have strength deficits bilaterally. Pt with fair ability to perform functional squat activity to assist with ADL and proper body mechanics while lifting light objects from floor. Pt with appropriate performance on machine resistance training circuit, is prepared to continue performing with enrollment at local gym. Pt and PT agreed to withhold therapy sessions for ~2 weeks, Pt to monitor symptoms and determine if further sessions are required. Pt encouraged to adhere to HEP, Pt understood and agreed.    This is a 57 y.o. female referred to outpatient physical therapy and presents with a medical diagnosis of Low Back Pain and demonstrates limitations as described in the problem list. Pt prognosis is Good. Pt will continue to benefit from skilled outpatient physical therapy to address the deficits listed in the problem list, provide pt/family education and to maximize pt's level of independence in the home and community environment.     Goals as follows:  Short Term GOALS: 4 weeks. Pt agrees with goals set.  1. Patient demonstrates independence with HEP. Met  2. Patient demonstrates independence with  Postural Awareness. Not met  3. Patient demonstrates independence with body mechanics. Not met  4. Patient will report pain of 6/10 at worst, on 0-10 pain scale, with all activity Met  5. Patient demonstrates increased cervical active ROM by 10 degrees in all planes to improve tolerance to functional activities pain free. met  6. Patient demonstrates increased lumbar active ROM by 10 degrees to improve tolerance to functional activities pain free. met       Long Term GOALS: 8 weeks. Pt agrees with goals set.  1. Patient demonstrates increased cervical ROM to WFL to improve tolerance to functional activities pain free. met  2. Patient demonstrates increased strength BLE's to 4+/5 or greater to improve tolerance to functional activities pain free. Not met  3. Patient demonstrates improved overall function per FOTO Lumbar Survey to 50% Limitation or less. met  4. Patient will report pain of 3/10 at worst, on 0-10 pain scale, with all activity met  5. Patient demonstrates increased lumbar ROM to WFL to improve tolerance to functional activities pain free not met  6. Patient demonstrates ability to perform functional squat with proper movement pattern, 5 times consecutively, thighs to 90 degrees parallel, to improve tolerance to picking objects off floor met    Functional Limitations Reports - G Codes  Category: Mobility  Tool: FOTO Lumbar Survey  Score: 50% Limitation   Modifier  Impairment Limitation Restriction    CH  0 % impaired, limited or restricted    CI  @ least 1% but less than 20% impaired, limited or restricted    CJ  @ least 20%<40% impaired, limited or restricted    CK  @ least 40%<60% impaired, limited or restricted    CL  @ least 60% <80% impaired, limited or restricted    CM  @ least 80%<100% impaired limited or restricted    CN  100% impaired, limited or restricted     Current/: CK = 40-60% Limitation   Goal/ : CK = 40-60% Limitation    Functional Limitations Reports - G Codes  Category:  Mobility  Tool: FOTO Cervical Survey  Score: 37% Limitation   Modifier  Impairment Limitation Restriction    CH  0 % impaired, limited or restricted    CI  @ least 1% but less than 20% impaired, limited or restricted    CJ  @ least 20%<40% impaired, limited or restricted    CK  @ least 40%<60% impaired, limited or restricted    CL  @ least 60% <80% impaired, limited or restricted    CM  @ least 80%<100% impaired limited or restricted    CN  100% impaired, limited or restricted     Current/: CJ = 20-40% Limitation   Goal/ : CK = 40-60% Limitation    Plan     Certification Period: 1/16/18 - 4/16/18    Continue with established Plan of Care towards PT goals.    Therapist: Mendez Sorto, PT  3/2/2018

## 2018-03-09 ENCOUNTER — PATIENT OUTREACH (OUTPATIENT)
Dept: OTHER | Facility: OTHER | Age: 58
End: 2018-03-09

## 2018-03-09 ENCOUNTER — PES CALL (OUTPATIENT)
Dept: ADMINISTRATIVE | Facility: CLINIC | Age: 58
End: 2018-03-09

## 2018-03-09 NOTE — PROGRESS NOTES
Last 5 Patient Entered Readings                                      Current 30 Day Average: 117/68     Recent Readings 3/8/2018 3/8/2018 3/8/2018 3/7/2018 3/6/2018    SBP (mmHg) 117 117 117 122 122    DBP (mmHg) 70 70 70 67 70    Pulse - 65 65 66 -          Hypertension Digital Medicine (HDMP) Health  Follow Up    LVM to follow up with Mrs. Omid Macias Abiodun.    Per 30 day average, blood pressure is well controlled 117/68 mmHg. Encouraged adherence to low sodium diet and physical activity guidelines. Advised patient to call or message with questions or concerns.

## 2018-04-05 ENCOUNTER — PATIENT OUTREACH (OUTPATIENT)
Dept: OTHER | Facility: OTHER | Age: 58
End: 2018-04-05

## 2018-04-05 NOTE — PROGRESS NOTES
Last 5 Patient Entered Readings                                      Current 30 Day Average: 119/68     Recent Readings 3/30/2018 3/30/2018 3/25/2018 3/25/2018 3/20/2018    SBP (mmHg) 114 114 117 117 125    DBP (mmHg) 71 71 66 66 66    Pulse - 69 - 71 70          Hypertension Digital Medicine (HDMP) Health  Follow Up    LVM to follow up with Mrs. Omid Macias Abiodun.    Per 30 day average, blood pressure is well controlled 119/68 mmHg. Encouraged adherence to low sodium diet and physical activity guidelines. Advised patient to call or message with questions or concerns.

## 2018-04-20 ENCOUNTER — OFFICE VISIT (OUTPATIENT)
Dept: HEMATOLOGY/ONCOLOGY | Facility: CLINIC | Age: 58
End: 2018-04-20
Payer: MEDICARE

## 2018-04-20 ENCOUNTER — OFFICE VISIT (OUTPATIENT)
Dept: OBSTETRICS AND GYNECOLOGY | Facility: CLINIC | Age: 58
End: 2018-04-20
Payer: MEDICARE

## 2018-04-20 ENCOUNTER — HOSPITAL ENCOUNTER (OUTPATIENT)
Dept: RADIOLOGY | Facility: HOSPITAL | Age: 58
Discharge: HOME | End: 2018-04-20
Attending: OBSTETRICS & GYNECOLOGY
Payer: MEDICARE

## 2018-04-20 VITALS
TEMPERATURE: 98 F | DIASTOLIC BLOOD PRESSURE: 109 MMHG | HEIGHT: 64 IN | SYSTOLIC BLOOD PRESSURE: 177 MMHG | RESPIRATION RATE: 16 BRPM | HEART RATE: 87 BPM | BODY MASS INDEX: 30.19 KG/M2 | WEIGHT: 176.81 LBS | OXYGEN SATURATION: 98 %

## 2018-04-20 VITALS
BODY MASS INDEX: 30.05 KG/M2 | HEIGHT: 64 IN | SYSTOLIC BLOOD PRESSURE: 155 MMHG | WEIGHT: 176 LBS | DIASTOLIC BLOOD PRESSURE: 110 MMHG

## 2018-04-20 DIAGNOSIS — Z01.419 WELL WOMAN EXAM WITH ROUTINE GYNECOLOGICAL EXAM: Primary | ICD-10-CM

## 2018-04-20 DIAGNOSIS — D58.2 HEMOGLOBIN C TRAIT: ICD-10-CM

## 2018-04-20 DIAGNOSIS — D64.9 ANEMIA, UNSPECIFIED TYPE: ICD-10-CM

## 2018-04-20 DIAGNOSIS — D47.2 MONOCLONAL GAMMOPATHY: Primary | ICD-10-CM

## 2018-04-20 DIAGNOSIS — Z12.39 SCREENING FOR BREAST CANCER: ICD-10-CM

## 2018-04-20 DIAGNOSIS — M89.9 FRONTAL SKULL LESION: ICD-10-CM

## 2018-04-20 DIAGNOSIS — D58.2 PRESENCE OF HEMOGLOBIN DELTA CHAIN VARIANT: ICD-10-CM

## 2018-04-20 PROCEDURE — 3080F DIAST BP >= 90 MM HG: CPT | Mod: CPTII,S$GLB,, | Performed by: OBSTETRICS & GYNECOLOGY

## 2018-04-20 PROCEDURE — 3077F SYST BP >= 140 MM HG: CPT | Mod: CPTII,S$GLB,, | Performed by: OBSTETRICS & GYNECOLOGY

## 2018-04-20 PROCEDURE — 3080F DIAST BP >= 90 MM HG: CPT | Mod: CPTII,GC,S$GLB, | Performed by: INTERNAL MEDICINE

## 2018-04-20 PROCEDURE — 88175 CYTOPATH C/V AUTO FLUID REDO: CPT

## 2018-04-20 PROCEDURE — 77067 SCR MAMMO BI INCL CAD: CPT | Mod: TC

## 2018-04-20 PROCEDURE — 77067 SCR MAMMO BI INCL CAD: CPT | Mod: 26,,, | Performed by: RADIOLOGY

## 2018-04-20 PROCEDURE — G0101 CA SCREEN;PELVIC/BREAST EXAM: HCPCS | Mod: S$GLB,,, | Performed by: OBSTETRICS & GYNECOLOGY

## 2018-04-20 PROCEDURE — 99999 PR PBB SHADOW E&M-EST. PATIENT-LVL III: CPT | Mod: PBBFAC,GC,,

## 2018-04-20 PROCEDURE — 99213 OFFICE O/P EST LOW 20 MIN: CPT | Mod: GC,S$GLB,, | Performed by: INTERNAL MEDICINE

## 2018-04-20 PROCEDURE — 87624 HPV HI-RISK TYP POOLED RSLT: CPT

## 2018-04-20 PROCEDURE — 77063 BREAST TOMOSYNTHESIS BI: CPT | Mod: 26,,, | Performed by: RADIOLOGY

## 2018-04-20 PROCEDURE — 3077F SYST BP >= 140 MM HG: CPT | Mod: CPTII,GC,S$GLB, | Performed by: INTERNAL MEDICINE

## 2018-04-20 PROCEDURE — 99999 PR PBB SHADOW E&M-EST. PATIENT-LVL II: CPT | Mod: PBBFAC,,, | Performed by: OBSTETRICS & GYNECOLOGY

## 2018-04-20 NOTE — PROGRESS NOTES
History & Physical  Gynecology      SUBJECTIVE:     Chief Complaint: Well Woman       History of Present Illness:  Annual Exam-Postmenopausal  Patient presents for annual exam. The patient has no complaints today. Patient denies post-menopausal vaginal bleeding. The patient is sexually active. The patient is not taking hormone replacement therapy.  The patient participates in regular exercise: yes.  She does not smoke.     GYN screening history: last pap: approximate date  and was normal  Mammogram history: ordered  Colonoscopy history: ordered      Review of patient's allergies indicates:   Allergen Reactions    Lactose     Sulfa (sulfonamide antibiotics) Swelling    Latex, natural rubber Rash    Shellfish containing products Rash    Strawberries [strawberry] Rash       Past Medical History:   Diagnosis Date    Anemia     CKD (chronic kidney disease), stage II 2016    Depression     Hemoglobin C trait     Hypertension     Impaired hearing     Lumbago 8/10/2015    Monoclonal gammopathy 11/10/2016    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     Past Surgical History:   Procedure Laterality Date    LASER ABLATION OF THE CERVIX      NO PAST SURGERIES       OB History      Para Term  AB Living    4 2 2   2      SAB TAB Ectopic Multiple Live Births    2                Family History   Problem Relation Age of Onset    Stroke Father     Heart failure Mother     No Known Problems Sister     No Known Problems Brother     No Known Problems Brother     No Known Problems Brother     No Known Problems Sister     No Known Problems Sister     No Known Problems Sister     No Known Problems Brother     No Known Problems Sister     Diabetes Sister     No Known Problems Maternal Aunt     No Known Problems Maternal Uncle     No Known Problems Paternal Aunt     No Known Problems Paternal Uncle     No Known Problems Maternal Grandmother     No Known Problems Maternal Grandfather     No Known  Problems Paternal Grandmother     No Known Problems Paternal Grandfather     Amblyopia Neg Hx     Blindness Neg Hx     Cancer Neg Hx     Cataracts Neg Hx     Glaucoma Neg Hx     Hypertension Neg Hx     Macular degeneration Neg Hx     Retinal detachment Neg Hx     Strabismus Neg Hx     Thyroid disease Neg Hx      Social History   Substance Use Topics    Smoking status: Never Smoker    Smokeless tobacco: Never Used    Alcohol use No       Current Outpatient Prescriptions   Medication Sig    meloxicam (MOBIC) 15 MG tablet Take 1 tablet (15 mg total) by mouth daily as needed (low back pain). Change to daily as needed for pain if possible    cetirizine (ZYRTEC) 10 MG tablet Take 1 tablet (10 mg total) by mouth daily as needed for Allergies or Rhinitis.     No current facility-administered medications for this visit.        Review of Systems:  Review of Systems   Constitutional: Negative for activity change, appetite change and fever.   Respiratory: Negative for shortness of breath.    Cardiovascular: Negative for chest pain.   Gastrointestinal: Negative for abdominal pain, constipation, diarrhea, nausea and vomiting.   Genitourinary: Negative for menorrhagia, menstrual problem, pelvic pain, vaginal bleeding, vaginal discharge and vaginal pain.   Neurological: Negative for numbness and headaches.   Breast: Negative for breast pain and nipple discharge       OBJECTIVE:     Physical Exam:  Physical Exam   Constitutional: She is oriented to person, place, and time. She appears well-developed and well-nourished.   Neck: Normal range of motion. Neck supple. No tracheal deviation present. No thyromegaly present.   Cardiovascular: Normal rate, regular rhythm and normal heart sounds.    Pulmonary/Chest: Effort normal and breath sounds normal. Right breast exhibits no inverted nipple, no mass, no nipple discharge, no skin change and no tenderness. Left breast exhibits no inverted nipple, no mass, no nipple  discharge, no skin change and no tenderness. Breasts are symmetrical.   Abdominal: Soft.   Genitourinary: Vagina normal and uterus normal. No labial fusion. There is no rash, tenderness, lesion or injury on the right labia. There is no rash, tenderness, lesion or injury on the left labia. Uterus is not deviated, not enlarged, not fixed and not tender. Cervix exhibits no motion tenderness, no discharge and no friability. Right adnexum displays no mass, no tenderness and no fullness. Left adnexum displays no mass, no tenderness and no fullness. No erythema, tenderness or bleeding in the vagina. No foreign body in the vagina. No signs of injury around the vagina. No vaginal discharge found.   Neurological: She is alert and oriented to person, place, and time.   Psychiatric: She has a normal mood and affect. Her behavior is normal. Judgment and thought content normal.   Nursing note and vitals reviewed.      Chaperoned by: Ani    ASSESSMENT:       ICD-10-CM ICD-9-CM    1. Well woman exam with routine gynecological exam Z01.419 V72.31 Liquid-based pap smear, screening      HPV High Risk Genotypes, PCR          Plan:      Omid was seen today for well woman.    Diagnoses and all orders for this visit:    Well woman exam with routine gynecological exam  -     Liquid-based pap smear, screening  -     HPV High Risk Genotypes, PCR        Orders Placed This Encounter   Procedures    HPV High Risk Genotypes, PCR       Well Woman:   - Pap smear: and HPV today  - Mammogram: today  - Colonoscopy: ordered by another provider  - Dexa: n/a  - Immunizations: none required  - Labs: to be done by pcp  - Exercise counseling provided    Follow up in one year for annual, or prn.    Juhi Polanco

## 2018-04-20 NOTE — PROGRESS NOTES
Subjective:       Patient ID: Omid Rascon is a 57 y.o. female.    Chief Complaint: MGUS (monoclonal gammopathy of unknown significance    Mrs. Rascon is a 56 year old [place] with history for hypertension, hgb C trait, hearing impairment, CKD2 and history of back injury who returns for follow up regarding her history of a right frontal head ba first detected January 2016 and later associated with an MGUS that was detected 11/10/16. MGUS was no longer detected in 2/27/17.  Her right frontal calvarial mass resolved spontaneously by April 2017. In August 2017, patient had a mild elevation in serum kappa light chain 3.42 mg/dL with ratio 1.97. Repeat 12/29/17 showed decrease in kappa light chain to 2.95 with ratio of 1.77.  On prior visits, complained of more fatigue and more prominent back pain. No headache, changes in vision. No weight loss or night sweats, masses or lymphadenoapthy. Hgb slightly lower than prior at 11.4 g/dL. Has had chronically low MCV and MCH 77 and 26.1 respectively. She has normal plt and WBC. Cr on 12/11/17 was 1.0. Iron studies were within normal range. She had an MRI of spine 1/8/18 which showed focal area of marrow signal abnormality posterior to the T4 vertebral body, possibly a normal variant and a focal area of marrow signal abnormality posterior to the L4 vertebral body which is favored to represent a slightly atypical osseous hemangioma. Patient comes for follow up for anemia and slight elevation in light chains. Last light chain analysis showed a . Hgb 11.8 which is stable baseline. 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 Cr today 1.2. BP in doctor's office is always high. She states it is controlled at home.      Remote History:  Mrs. Rascon was evaluated by Dr. Keith of neurosurgery in January 2016 for a right scalp mass. She had imaging studies 1/28/16 that included head CT and brain MRI. Right frontal T1  hypointense calvarial lesion with prominence of the overlying soft tissues. Additionally, there is a mildly enhancing dural lesion along the anterior falx without evidence for intra-axial enhancement or parenchymal lesion. Lucencies in the right frontal calvarium and hyperattenuating lesion along the anterior cerebral falx correspond to lesions seen on MRI.   She was referred to Dr. Ken for oncologic work up. CT of neck chest abdomen and pelvis were negative for malignancy in February 2016. Patient had repeat brain and and calvarium imaging in March of 2016 prior to anticipated biopsy. Lesions appeared unchanged.   Surgery for biopsy was postponed due to patient's uncontrolled blood pressure.   She has been seen by nephrology, Dr. Rosa to address her refractory hypertension as well as her early stage CKD. Serum free light cahin, SPEP and KEVIN drawn 11/10/16. FLC showed mildly elevated kappa = 2.29 with babak ratio. SPEP was normal but KEVIN detected an IgG lambda specific monoclonal protein at the beta/gamma junction. This had been seen on KEVIN 5/16/16 as well.   She reports longstanding history of anemia she attributes to Hgb C trait. She has received two blood transfusions in the distant past. She has a history of heavy menstrual bleeding which required a uterine lining ablation.      Review of Systems   Constitutional: Negative for appetite change and unexpected weight change.   Eyes: Negative for visual disturbance.   Respiratory: Negative for cough and shortness of breath.    Cardiovascular: Negative for chest pain.   Gastrointestinal: Negative for abdominal pain and diarrhea.   Genitourinary: Negative for frequency.   Musculoskeletal: Positive for back pain.   Skin: Negative for rash.   Neurological: Negative for headaches.   Hematological: Negative for adenopathy.   Psychiatric/Behavioral: The patient is not nervous/anxious.        Objective:      Physical Exam   Constitutional: She is oriented to person,  place, and time. She appears well-developed and well-nourished.   HENT:   Mouth/Throat: Oropharynx is clear and moist.   Eyes: Conjunctivae are normal. Pupils are equal, round, and reactive to light.   Cardiovascular: Normal rate and regular rhythm.    Pulmonary/Chest: Effort normal and breath sounds normal.   Abdominal: Soft. Bowel sounds are normal.   Lymphadenopathy:     She has no cervical adenopathy.   Neurological: She is alert and oriented to person, place, and time.   Skin: Skin is warm and dry.   Psychiatric: She has a normal mood and affect. Her behavior is normal.       Assessment:       1. Monoclonal gammopathy    2. Anemia, unspecified type    3. Hemoglobin C trait    4. Frontal skull lesion    5. Presence of hemoglobin delta chain variant        Plan:   Anemia stable  Hgb C trait with delta chain variant should not cause anemia  Follow up pending light chain.   Refer for colonoscopy (screening about due)  Follow up in about 4-6 months for anemia follow up.     Rocio Harrell MD   Pager [phone]        ATTENDING NOTE, ONCOLOGY CLINIC    As above; Patient seen and examined, chart reviewed.  Appears comfortable, in NAD.  Lungs are clear to auscultation.  Abdomen is soft, nontender.  Labs reviewed.    PLAN  RTC in 6 months with a repeat CBC.      Vasu Campbell MD

## 2018-04-23 ENCOUNTER — PATIENT OUTREACH (OUTPATIENT)
Dept: OTHER | Facility: OTHER | Age: 58
End: 2018-04-23

## 2018-04-23 NOTE — PROGRESS NOTES
Called patient to review readings. LVM.   BP at goal, <130/80 mmHg  Continue monitoring      Last 5 Patient Entered Readings                                      Current 30 Day Average: 118/70     Recent Readings 4/21/2018 4/21/2018 4/20/2018 4/20/2018 4/18/2018    SBP (mmHg) 119 119 121 121 121    DBP (mmHg) 66 66 75 75 68    Pulse - 66 - 66 -

## 2018-04-25 LAB
HPV16 AG SPEC QL: NEGATIVE
HPV16+18+H RISK 12 DNA CVX-IMP: NEGATIVE
HPV18 DNA SPEC QL NAA+PROBE: NEGATIVE

## 2018-05-01 NOTE — PROGRESS NOTES
"Ms. Rascon continues to feel "ususually tired". She is working with heme/onc to treat MGUS. She would like Dr. Hendrickson to be her digital medicine provider. She is concerned about upcoming dental work because of her white coat HTN. I advised her to report digital readings to dental provider for consideration.       Last 5 Patient Entered Readings                                      Current 30 Day Average: 118/72     Recent Readings 4/29/2018 4/29/2018 4/27/2018 4/27/2018 4/26/2018    SBP (mmHg) 119 119 119 119 111    DBP (mmHg) 73 73 77 77 74    Pulse - 70 70 - -          BP at goal, <130/80 - off BP medications  Continue monitoring              "

## 2018-05-10 ENCOUNTER — PATIENT MESSAGE (OUTPATIENT)
Dept: ADMINISTRATIVE | Facility: OTHER | Age: 58
End: 2018-05-10

## 2018-06-08 ENCOUNTER — PATIENT OUTREACH (OUTPATIENT)
Dept: OTHER | Facility: OTHER | Age: 58
End: 2018-06-08

## 2018-06-08 NOTE — PROGRESS NOTES
Last 5 Patient Entered Readings                                      Current 30 Day Average: 118/73     Recent Readings 2018 2018 6/3/2018 6/3/2018 2018    SBP (mmHg) 119 119 120 120 117    DBP (mmHg) 76 76 70 70 71    Pulse - 65 - 74 -          Digital Medicine: Health  Follow Up    Lifestyle Modifications:    1.Dietary Modifications (Sodium intake <2,000mg/day, food labels, dining out): Patient continue working on her low sodium diet. She is just trying to eat healthier in general because she would like to loose some weight. I also sent her some resources to help her with her sodium intake and weight loss.     2.Physical Activity: Patient wants to increase her physical activity more. She use to go to the gym and do water aerobics and walk but since her sons work schedule has changed, she cannot get a ride there. She can walk around the neighborhood but lately, its been too hot. I suggested that she either go very early in the morning or late in the afternoon so it will not be so hot. I also sent her some exercise videos that she can do in her home.     3.Medication Therapy: Patient has been compliant with the medication regimen.    4.Patient has the following medication side effects/concerns:   (Frequency/Alleviating factors/Precipitating factors, etc.)     Patient is in the [place] right now for her mothers . She has her cuff and is taking BP readings.     Follow up with Mrs. Omid Macias Abiodun completed. No further questions or concerns. Will continue follow up to achieve health goals.

## 2018-07-05 ENCOUNTER — OFFICE VISIT (OUTPATIENT)
Dept: OPTOMETRY | Facility: CLINIC | Age: 58
End: 2018-07-05
Payer: MEDICARE

## 2018-07-05 ENCOUNTER — LAB VISIT (OUTPATIENT)
Dept: LAB | Facility: HOSPITAL | Age: 58
End: 2018-07-05
Attending: INTERNAL MEDICINE
Payer: MEDICARE

## 2018-07-05 DIAGNOSIS — I10 WHITE COAT SYNDROME WITH DIAGNOSIS OF HYPERTENSION: Chronic | ICD-10-CM

## 2018-07-05 DIAGNOSIS — N28.1 RENAL CYST: ICD-10-CM

## 2018-07-05 DIAGNOSIS — H52.4 HYPEROPIA WITH ASTIGMATISM AND PRESBYOPIA, BILATERAL: ICD-10-CM

## 2018-07-05 DIAGNOSIS — I10 ESSENTIAL HYPERTENSION: ICD-10-CM

## 2018-07-05 DIAGNOSIS — H43.391 VITREOUS FLOATERS OF RIGHT EYE: Primary | ICD-10-CM

## 2018-07-05 DIAGNOSIS — H52.03 HYPEROPIA WITH ASTIGMATISM AND PRESBYOPIA, BILATERAL: ICD-10-CM

## 2018-07-05 DIAGNOSIS — H52.203 HYPEROPIA WITH ASTIGMATISM AND PRESBYOPIA, BILATERAL: ICD-10-CM

## 2018-07-05 DIAGNOSIS — H25.13 NUCLEAR SCLEROSIS OF BOTH EYES: ICD-10-CM

## 2018-07-05 LAB
CREAT UR-MCNC: 101 MG/DL
MICROALBUMIN UR DL<=1MG/L-MCNC: 32 UG/ML
MICROALBUMIN/CREATININE RATIO: 31.7 UG/MG

## 2018-07-05 PROCEDURE — 82043 UR ALBUMIN QUANTITATIVE: CPT

## 2018-07-05 PROCEDURE — 99999 PR PBB SHADOW E&M-EST. PATIENT-LVL I: CPT | Mod: PBBFAC,,, | Performed by: OPTOMETRIST

## 2018-07-05 PROCEDURE — 92015 DETERMINE REFRACTIVE STATE: CPT | Mod: S$GLB,,, | Performed by: OPTOMETRIST

## 2018-07-05 PROCEDURE — 92014 COMPRE OPH EXAM EST PT 1/>: CPT | Mod: S$GLB,,, | Performed by: OPTOMETRIST

## 2018-07-05 NOTE — PROGRESS NOTES
Subjective:       Patient ID: Omid Rascon is a 58 y.o. female      Chief Complaint   Patient presents with    Concerns About Ocular Health     History of Present Illness  DLS:05/03/2017 [place](pt hearing impaired)  Patient here for annual check up.  Pt states OU vision seem stable. Using OTC readers.  Still seeing floaters.(no change)      Assessment/Plan:     1. Vitreous floaters of right eye  Stable. Monitor.    2. Essential hypertension  No hypertensive retinopathy. Continue BP control. RTC 1 year for DFE.    3. Nuclear sclerosis of both eyes  Educated pt on presence of cataracts and effects on vision. No surgery at this time. Recheck in one year.    4. Hyperopia with astigmatism and presbyopia, bilateral  Educated patient on refractive error and discussed lens options. Dispensed updated spectacle Rx. Educated about adaptation period to new specs.  Eyeglass Final Rx     Eyeglass Final Rx       Sphere Cylinder Axis Add    Right +1.00 +0.75 180 +2.00    Left +1.25 +0.75 165 +2.00    Expiration Date:  7/6/2019                Follow-up in about 1 year (around 7/5/2019).

## 2018-07-10 ENCOUNTER — PATIENT OUTREACH (OUTPATIENT)
Dept: OTHER | Facility: OTHER | Age: 58
End: 2018-07-10

## 2018-07-10 NOTE — PROGRESS NOTES
Last 5 Patient Entered Readings                                      Current 30 Day Average: 122/73     Recent Readings 7/2/2018 7/2/2018 6/28/2018 6/28/2018 6/21/2018    SBP (mmHg) 120 120 126 126 121    DBP (mmHg) 73 73 65 65 77    Pulse - 70 67 - 66            Digital Medicine: Health  Follow Up    Left voicemail to follow up with Mrs. Omid Macias Aibodun.  Current BP average 122/73 mmHg is at goal, <130/80.

## 2018-07-19 ENCOUNTER — PATIENT MESSAGE (OUTPATIENT)
Dept: OTHER | Facility: OTHER | Age: 58
End: 2018-07-19

## 2018-08-13 NOTE — PROGRESS NOTES
Last 5 Patient Entered Readings                                      Current 30 Day Average: 121/72     Recent Readings 8/10/2018 8/10/2018 8/6/2018 8/6/2018 8/6/2018    SBP (mmHg) 123 123 124 124 112    DBP (mmHg) 73 73 70 70 79    Pulse - 68 70 - -            Digital Medicine: Health  Follow Up    Left voicemail to follow up with Mrs. Omid Macias Abiodun.  Current BP average 121/72 mmHg is at goal, <130/80.

## 2018-08-28 ENCOUNTER — PES CALL (OUTPATIENT)
Dept: ADMINISTRATIVE | Facility: CLINIC | Age: 58
End: 2018-08-28

## 2018-09-12 NOTE — PROGRESS NOTES
Last 5 Patient Entered Readings                                      Current 30 Day Average: 116/73     Recent Readings 9/6/2018 9/6/2018 9/4/2018 9/4/2018 9/1/2018    SBP (mmHg) 105 105 115 115 114    DBP (mmHg) 70 70 76 76 78    Pulse 71 - 70 - -          Digital Medicine: Health  Follow Up    Lifestyle Modifications:    1.Dietary Modifications (Sodium intake <2,000mg/day, food labels, dining out): Patient does well with monitoring her sodium intake. She avoids highly salted foods and does not add salt to meals.     2.Physical Activity: Patient stated that she has not been walking like she knows she should. She attributes this to the heat and her son moving out of [place] so she does not have anyone to walk with. She plans to get back on track with this soon.     3.Medication Therapy: Patient is not on any BP medication.     4.Patient has the following medication side effects/concerns:   (Frequency/Alleviating factors/Precipitating factors, etc.)     Follow up with  Omid Colleen Rascon completed. No further questions or concerns. Will continue follow up to achieve health goals.

## 2018-10-08 ENCOUNTER — LAB VISIT (OUTPATIENT)
Dept: LAB | Facility: HOSPITAL | Age: 58
End: 2018-10-08
Attending: INTERNAL MEDICINE
Payer: MEDICARE

## 2018-10-08 ENCOUNTER — OFFICE VISIT (OUTPATIENT)
Dept: HEMATOLOGY/ONCOLOGY | Facility: CLINIC | Age: 58
End: 2018-10-08
Payer: MEDICARE

## 2018-10-08 VITALS
DIASTOLIC BLOOD PRESSURE: 119 MMHG | HEIGHT: 64 IN | BODY MASS INDEX: 30.83 KG/M2 | RESPIRATION RATE: 19 BRPM | TEMPERATURE: 98 F | WEIGHT: 180.56 LBS | HEART RATE: 89 BPM | SYSTOLIC BLOOD PRESSURE: 190 MMHG | OXYGEN SATURATION: 100 %

## 2018-10-08 DIAGNOSIS — D64.9 ANEMIA, UNSPECIFIED TYPE: ICD-10-CM

## 2018-10-08 DIAGNOSIS — D47.2 MONOCLONAL GAMMOPATHY: ICD-10-CM

## 2018-10-08 DIAGNOSIS — D47.2 MONOCLONAL GAMMOPATHY: Primary | ICD-10-CM

## 2018-10-08 LAB
BASOPHILS # BLD AUTO: 0.05 K/UL
BASOPHILS NFR BLD: 1.1 %
DIFFERENTIAL METHOD: ABNORMAL
EOSINOPHIL # BLD AUTO: 0 K/UL
EOSINOPHIL NFR BLD: 0.6 %
ERYTHROCYTE [DISTWIDTH] IN BLOOD BY AUTOMATED COUNT: 14.1 %
FERRITIN SERPL-MCNC: 58 NG/ML
HCT VFR BLD AUTO: 35.4 %
HGB BLD-MCNC: 11.9 G/DL
IMM GRANULOCYTES # BLD AUTO: 0.02 K/UL
IMM GRANULOCYTES NFR BLD AUTO: 0.4 %
LYMPHOCYTES # BLD AUTO: 1.9 K/UL
LYMPHOCYTES NFR BLD: 40.9 %
MCH RBC QN AUTO: 26.4 PG
MCHC RBC AUTO-ENTMCNC: 33.6 G/DL
MCV RBC AUTO: 79 FL
MONOCYTES # BLD AUTO: 0.4 K/UL
MONOCYTES NFR BLD: 8.4 %
NEUTROPHILS # BLD AUTO: 2.3 K/UL
NEUTROPHILS NFR BLD: 48.6 %
NRBC BLD-RTO: 0 /100 WBC
PLATELET # BLD AUTO: 202 K/UL
PMV BLD AUTO: 10.2 FL
RBC # BLD AUTO: 4.5 M/UL
WBC # BLD AUTO: 4.64 K/UL

## 2018-10-08 PROCEDURE — 85025 COMPLETE CBC W/AUTO DIFF WBC: CPT

## 2018-10-08 PROCEDURE — 36415 COLL VENOUS BLD VENIPUNCTURE: CPT

## 2018-10-08 PROCEDURE — 82728 ASSAY OF FERRITIN: CPT

## 2018-10-08 PROCEDURE — 83520 IMMUNOASSAY QUANT NOS NONAB: CPT | Mod: 59

## 2018-10-08 PROCEDURE — 3008F BODY MASS INDEX DOCD: CPT | Mod: CPTII,,, | Performed by: INTERNAL MEDICINE

## 2018-10-08 PROCEDURE — 99999 PR PBB SHADOW E&M-EST. PATIENT-LVL III: CPT | Mod: PBBFAC,,, | Performed by: INTERNAL MEDICINE

## 2018-10-08 PROCEDURE — 3080F DIAST BP >= 90 MM HG: CPT | Mod: CPTII,,, | Performed by: INTERNAL MEDICINE

## 2018-10-08 PROCEDURE — 99213 OFFICE O/P EST LOW 20 MIN: CPT | Mod: PBBFAC | Performed by: INTERNAL MEDICINE

## 2018-10-08 PROCEDURE — 99214 OFFICE O/P EST MOD 30 MIN: CPT | Mod: S$PBB,,, | Performed by: INTERNAL MEDICINE

## 2018-10-08 PROCEDURE — 3077F SYST BP >= 140 MM HG: CPT | Mod: CPTII,,, | Performed by: INTERNAL MEDICINE

## 2018-10-09 LAB
KAPPA LC SER QL IA: 2.73 MG/DL
KAPPA LC/LAMBDA SER IA: 1.76
LAMBDA LC SER QL IA: 1.55 MG/DL

## 2018-10-11 ENCOUNTER — PATIENT OUTREACH (OUTPATIENT)
Dept: OTHER | Facility: OTHER | Age: 58
End: 2018-10-11

## 2018-10-11 NOTE — PROGRESS NOTES
Last 5 Patient Entered Readings                                      Current 30 Day Average: 113/73     Recent Readings 10/11/2018 10/11/2018 10/5/2018 10/5/2018 10/4/2018    SBP (mmHg) 130 130 105 105 105    DBP (mmHg) 80 80 70 70 70    Pulse 73 - - 71 71          Digital Medicine: Health  Follow Up    Lifestyle Modifications:    1.Dietary Modifications (Sodium intake <2,000mg/day, food labels, dining out): Deferred    2.Physical Activity: Patient is still not walking like she was when her son lived here due to her stress and being busy. She knows she needs to incorporate this back into her daily routine.     3.Medication Therapy: Patient is not on any BP medication at this time. Patient is thinking that it may be a good idea to get back on her BP medication if her BP continues to trend up. She will takes her BP readings a few times each week so we can see if her BP continues to trend up. If so, I will request for her PharmD, NEENA Guillen, to reach out and discuss BP medication with her again.     4.Patient has the following medication side effects/concerns:   (Frequency/Alleviating factors/Precipitating factors, etc.)     Patient is under a bit of stress because her son moved to [place] and was hospitalized last night and she is not too sure what is going on. She attributes her higher BP reading today to that.     Follow up with Mrs. Omid Macias Abiodun completed. No further questions or concerns. Will continue to follow up to achieve health goals.

## 2018-10-11 NOTE — PROGRESS NOTES
Subjective:       Patient ID: Omid Rascon is a 58 y.o. female.    Chief Complaint: No chief complaint on file.      Today's Visit  Return visit to follow-up mild anemia and minimal elevation in kappa free light chain    HPI  Mrs. Rascon is a 56 year old [place] female [place] with history for hypertension, hgb C trait, hearing impairment, CKD2 and history of back injury who returns for follow up regarding her history of a right frontal head ba first detected January 2016 and later associated with an MGUS that was detected 11/10/16. MGUS was no longer detected in 2/27/17.  Her right frontal calvarial mass resolved spontaneously by April 2017. In August 2017, patient had a mild elevation in serum kappa light chain 3.42 mg/dL with ratio 1.97. Repeat 12/29/17 showed decrease in kappa light chain to 2.95 with ratio of 1.77.  On prior visits, complained of more fatigue and more prominent back pain. No headache, changes in vision. No weight loss or night sweats, masses or lymphadenoapthy. Hgb slightly lower than prior at 11.4 g/dL. Has had chronically low MCV and MCH 77 and 26.1 respectively. She has normal plt and WBC. Cr on 12/11/17 was 1.0. Iron studies were within normal range. She had an MRI of spine 1/8/18 which showed focal area of marrow signal abnormality posterior to the T4 vertebral body, possibly a normal variant and a focal area of marrow signal abnormality posterior to the L4 vertebral body which is favored to represent a slightly atypical osseous hemangioma. Patient comes for follow up for anemia and slight elevation in light chains. Last light chain analysis showed a . Hgb 11.8 which is stable baseline. 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 Cr today 1.2. BP in doctor's office is always high. She states it is controlled at home.      Remote History:  Mrs. Rascon was evaluated by Dr. Keith of neurosurgery in January 2016 for a right scalp mass. She had  imaging studies 1/28/16 that included head CT and brain MRI. Right frontal T1 hypointense calvarial lesion with prominence of the overlying soft tissues. Additionally, there is a mildly enhancing dural lesion along the anterior falx without evidence for intra-axial enhancement or parenchymal lesion. Lucencies in the right frontal calvarium and hyperattenuating lesion along the anterior cerebral falx correspond to lesions seen on MRI.   She was referred to Dr. Ken for oncologic work up. CT of neck chest abdomen and pelvis were negative for malignancy in February 2016. Patient had repeat brain and and calvarium imaging in March of 2016 prior to anticipated biopsy. Lesions appeared unchanged.   Surgery for biopsy was postponed due to patient's uncontrolled blood pressure.   She has been seen by nephrology, Dr. Rosa to address her refractory hypertension as well as her early stage CKD. Serum free light cahin, SPEP and KEVIN drawn 11/10/16. FLC showed mildly elevated kappa = 2.29 with babak ratio. SPEP was normal but KEVIN detected an IgG lambda specific monoclonal protein at the beta/gamma junction. This had been seen on KEVIN 5/16/16 as well.   She reports longstanding history of anemia she attributes to Hgb C trait. She has received two blood transfusions in the distant past. She has a history of heavy menstrual bleeding which required a uterine lining ablation.      Review of Systems   Constitutional: Negative for appetite change and unexpected weight change.   Eyes: Negative for visual disturbance.   Respiratory: Negative for cough and shortness of breath.    Cardiovascular: Negative for chest pain.   Gastrointestinal: Negative for abdominal pain and diarrhea.   Genitourinary: Negative for frequency.   Musculoskeletal: Positive for back pain.   Skin: Negative for rash.   Neurological: Negative for headaches.   Hematological: Negative for adenopathy.   Psychiatric/Behavioral: The patient is not nervous/anxious.         Objective:      Physical Exam   Constitutional: She is oriented to person, place, and time. She appears well-developed and well-nourished.   HENT:   Mouth/Throat: Oropharynx is clear and moist.   Eyes: Conjunctivae are normal. Pupils are equal, round, and reactive to light.   Cardiovascular: Normal rate and regular rhythm.   Pulmonary/Chest: Effort normal and breath sounds normal.   Abdominal: Soft. Bowel sounds are normal.   Lymphadenopathy:     She has no cervical adenopathy.   Neurological: She is alert and oriented to person, place, and time.   Skin: Skin is warm and dry.   Psychiatric: She has a normal mood and affect. Her behavior is normal.       Assessment:       1. Monoclonal gammopathy    2. Anemia, unspecified type        Plan:   Anemia stable  Hgb C trait with delta chain variant   Free light chains are stable  Follow up in about 12 months for anemia follow up.

## 2018-10-16 NOTE — PROGRESS NOTES
Reviewed BP readings. BP well managed, goal <130/80 mmHg  Continue monitoring      Last 5 Patient Entered Readings                                      Current 30 Day Average: 114/73     Recent Readings 10/11/2018 10/11/2018 10/5/2018 10/5/2018 10/4/2018    SBP (mmHg) 130 130 105 105 105    DBP (mmHg) 80 80 70 70 70    Pulse 73 - - 71 71

## 2018-11-13 ENCOUNTER — PATIENT OUTREACH (OUTPATIENT)
Dept: OTHER | Facility: OTHER | Age: 58
End: 2018-11-13

## 2018-11-13 NOTE — PROGRESS NOTES
Last 5 Patient Entered Readings                                      Current 30 Day Average: 118/74     Recent Readings 11/13/2018 11/13/2018 11/7/2018 11/7/2018 11/1/2018    SBP (mmHg) 118 118 110 110 118    DBP (mmHg) 78 78 65 65 78    Pulse - 82 - 74 -            Digital Medicine: Health  Follow Up    Left voicemail to follow up with Mrs. Omid Macias Abiodun.  Current BP average 118/74 mmHg is at goal, <130/80.

## 2018-12-03 ENCOUNTER — PATIENT MESSAGE (OUTPATIENT)
Dept: ADMINISTRATIVE | Facility: OTHER | Age: 58
End: 2018-12-03

## 2018-12-21 ENCOUNTER — PATIENT MESSAGE (OUTPATIENT)
Dept: ADMINISTRATIVE | Facility: OTHER | Age: 58
End: 2018-12-21

## 2019-01-03 NOTE — PROGRESS NOTES
Last 5 Patient Entered Readings                                      Current 30 Day Average: 116/72     Recent Readings 1/1/2019 1/1/2019 12/26/2018 12/26/2018 12/21/2018    SBP (mmHg) 124 124 120 120 105    DBP (mmHg) 66 66 73 73 70    Pulse - 71 - 73 -            Digital Medicine: Health  Follow Up    Left voicemail to follow up with Mrs. Omid Macias Abiodun.  Current BP average 116/72 mmHg is at goal, <130/80.

## 2019-01-07 ENCOUNTER — OFFICE VISIT (OUTPATIENT)
Dept: FAMILY MEDICINE | Facility: CLINIC | Age: 59
End: 2019-01-07
Payer: MEDICARE

## 2019-01-07 VITALS
OXYGEN SATURATION: 96 % | TEMPERATURE: 99 F | DIASTOLIC BLOOD PRESSURE: 76 MMHG | WEIGHT: 184.19 LBS | SYSTOLIC BLOOD PRESSURE: 112 MMHG | HEART RATE: 112 BPM | HEIGHT: 64 IN | BODY MASS INDEX: 31.45 KG/M2

## 2019-01-07 DIAGNOSIS — R29.898 WEAKNESS OF BOTH HIPS: ICD-10-CM

## 2019-01-07 DIAGNOSIS — H69.91 EUSTACHIAN TUBE DYSFUNCTION, RIGHT: ICD-10-CM

## 2019-01-07 DIAGNOSIS — I10 WHITE COAT SYNDROME WITH DIAGNOSIS OF HYPERTENSION: Chronic | ICD-10-CM

## 2019-01-07 DIAGNOSIS — M54.50 CHRONIC BILATERAL LOW BACK PAIN WITHOUT SCIATICA: ICD-10-CM

## 2019-01-07 DIAGNOSIS — I77.1 TORTUOUS AORTA: Chronic | ICD-10-CM

## 2019-01-07 DIAGNOSIS — N18.2 CKD (CHRONIC KIDNEY DISEASE), STAGE II: Chronic | ICD-10-CM

## 2019-01-07 DIAGNOSIS — Z00.00 ROUTINE MEDICAL EXAM: Primary | ICD-10-CM

## 2019-01-07 DIAGNOSIS — D58.2 HEMOGLOBIN C TRAIT: Chronic | ICD-10-CM

## 2019-01-07 DIAGNOSIS — H91.93 BILATERAL HEARING LOSS, UNSPECIFIED HEARING LOSS TYPE: Chronic | ICD-10-CM

## 2019-01-07 DIAGNOSIS — D58.2 PRESENCE OF HEMOGLOBIN DELTA CHAIN VARIANT: ICD-10-CM

## 2019-01-07 DIAGNOSIS — G89.29 CHRONIC BILATERAL LOW BACK PAIN WITHOUT SCIATICA: ICD-10-CM

## 2019-01-07 DIAGNOSIS — E04.2 MULTINODULAR THYROID: ICD-10-CM

## 2019-01-07 PROCEDURE — 99396 PR PREVENTIVE VISIT,EST,40-64: ICD-10-PCS | Mod: S$GLB,,, | Performed by: INTERNAL MEDICINE

## 2019-01-07 PROCEDURE — 99499 UNLISTED E&M SERVICE: CPT | Mod: HCNC,S$GLB,, | Performed by: INTERNAL MEDICINE

## 2019-01-07 PROCEDURE — 99499 RISK ADDL DX/OHS AUDIT: ICD-10-PCS | Mod: HCNC,S$GLB,, | Performed by: INTERNAL MEDICINE

## 2019-01-07 PROCEDURE — 3078F PR MOST RECENT DIASTOLIC BLOOD PRESSURE < 80 MM HG: ICD-10-PCS | Mod: CPTII,S$GLB,, | Performed by: INTERNAL MEDICINE

## 2019-01-07 PROCEDURE — 3078F DIAST BP <80 MM HG: CPT | Mod: CPTII,S$GLB,, | Performed by: INTERNAL MEDICINE

## 2019-01-07 PROCEDURE — 99999 PR PBB SHADOW E&M-EST. PATIENT-LVL V: ICD-10-PCS | Mod: PBBFAC,,, | Performed by: INTERNAL MEDICINE

## 2019-01-07 PROCEDURE — 3074F PR MOST RECENT SYSTOLIC BLOOD PRESSURE < 130 MM HG: ICD-10-PCS | Mod: CPTII,S$GLB,, | Performed by: INTERNAL MEDICINE

## 2019-01-07 PROCEDURE — 3074F SYST BP LT 130 MM HG: CPT | Mod: CPTII,S$GLB,, | Performed by: INTERNAL MEDICINE

## 2019-01-07 PROCEDURE — 99999 PR PBB SHADOW E&M-EST. PATIENT-LVL V: CPT | Mod: PBBFAC,,, | Performed by: INTERNAL MEDICINE

## 2019-01-07 PROCEDURE — 99396 PREV VISIT EST AGE 40-64: CPT | Mod: S$GLB,,, | Performed by: INTERNAL MEDICINE

## 2019-01-07 RX ORDER — MELOXICAM 15 MG/1
15 TABLET ORAL DAILY PRN
Qty: 90 TABLET | Refills: 0 | Status: SHIPPED | OUTPATIENT
Start: 2019-01-07 | End: 2020-06-26 | Stop reason: SDUPTHER

## 2019-01-07 RX ORDER — CETIRIZINE HYDROCHLORIDE 10 MG/1
10 TABLET ORAL DAILY PRN
Qty: 30 TABLET | Refills: 11 | Status: SHIPPED | OUTPATIENT
Start: 2019-01-07 | End: 2020-06-25 | Stop reason: SDUPTHER

## 2019-01-07 NOTE — PROGRESS NOTES
Assessment & Plan  Problem List Items Addressed This Visit        ENT    Bilateral hearing loss (Chronic)    Overview     Reports being told she needs cochlear implant in the past         Current Assessment & Plan     Requesting to have follow up with ENT         Relevant Orders    Ambulatory referral to ENT       Cardiac/Vascular    White coat syndrome with diagnosis of hypertension (Chronic)    Overview     In Digital HTN program with excellent control.          Current Assessment & Plan     Stable.  Monitor          Relevant Orders    Comprehensive metabolic panel    Lipid panel    Tortuous aorta (Chronic)    Overview     Stable, asymptomatic chronic condition.  Will continue to maximize risk factor reduction and adjust medication as needed.          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  Lumbar degenerative changes contributing to mild bilateral neural foraminal narrowing at L4-L5 and L5-S1.  No spinal canal stenosis.         Current Assessment & Plan     Stable from pain perspective.  Need to focus on strengthening exercises at this time         Relevant Medications    meloxicam (MOBIC) 15 MG tablet      Other Visit Diagnoses     Routine medical exam    -  Primary  -     Discussed healthy diet, regular exercise, necessary labs, age appropriate cancer screening, and routine vaccinations.       Eustachian tube dysfunction, right    -  Referred to ENT at patient request    Relevant Medications    cetirizine (ZYRTEC) 10 MG tablet    Other Relevant Orders    Ambulatory referral to ENT    Weakness of both hips    -  Check labs.  Referred to PT for strengthening.  If no improvement, may need to see back clinic    Relevant Orders    Ambulatory Referral to Physical/Occupational Therapy    CK    Sedimentation rate            Health Maintenance reviewed, I do not feel that she needs early Prevnar and pneumovax.     Follow-up: Follow-up in about 1 year (around 1/7/2020) for Routine Physical.  ______________________________________________________________________    Chief Complaint  Chief Complaint   Patient presents with    Annual Exam       HPI  Omid Rascon is a 58 y.o. female with medical diagnoses as listed in the medical history and problem list that presents for routine physical.  Pt is known to me with her last appointment 01/2018.      She continues to exhibit high BP readings in the Office but totally normal BPs ambulatory.  This /76.  She is requesting to see ENT.     She is also c/o her low back pain.  She reports that she is having worsening leg weakness.  She reports that she now has to take breaks when she is walking due to bilateral leg weakness.  She is using a wheelchair at times.  MRI last year showed no stenosis but some foraminal narrowing.  No back pain today.  She reports that she has to push on her legs with her arms to get out of a chair.       PAST MEDICAL HISTORY:  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       PAST SURGICAL  HISTORY:  Past Surgical History:   Procedure Laterality Date    LASER ABLATION OF THE CERVIX      NO PAST SURGERIES         SOCIAL HISTORY:  Social History     Socioeconomic History    Marital status:      Spouse name: Not on file    Number of children: Not on file    Years of education: Not on file    Highest education level: Not on file   Social Needs    Financial resource strain: Not on file    Food insecurity - worry: Not on file    Food insecurity - inability: Not on file    Transportation needs - medical: Not on file    Transportation needs - non-medical: Not on file   Occupational History    Not on file   Tobacco Use    Smoking status: Never Smoker    Smokeless tobacco: Never Used   Substance and Sexual Activity    Alcohol use: No    Drug use: No    Sexual activity: Yes     Partners: Male     Birth control/protection: See Surgical Hx   Other Topics Concern    Not on file   Social History Narrative    Not on file       FAMILY HISTORY:  Family History   Problem Relation Age of Onset    Stroke Father     Heart failure Mother     No Known Problems Sister     No Known Problems Brother     No Known Problems Brother     No Known Problems Brother     No Known Problems Sister     No Known Problems Sister     No Known Problems Sister     No Known Problems Brother     No Known Problems Sister     Diabetes Sister     No Known Problems Maternal Aunt     No Known Problems Maternal Uncle     No Known Problems Paternal Aunt     No Known Problems Paternal Uncle     No Known Problems Maternal Grandmother     No Known Problems Maternal Grandfather     No Known Problems Paternal Grandmother     No Known Problems Paternal Grandfather     Amblyopia Neg Hx     Blindness Neg Hx     Cancer Neg Hx     Cataracts Neg Hx     Glaucoma Neg Hx     Hypertension Neg Hx     Macular degeneration Neg Hx     Retinal detachment Neg Hx     Strabismus Neg Hx     Thyroid disease Neg Hx         ALLERGIES AND MEDICATIONS: updated and reviewed.  Review of patient's allergies indicates:   Allergen Reactions    Lactose     Sulfa (sulfonamide antibiotics) Swelling    Latex, natural rubber Rash    Shellfish containing products Rash    Strawberries [strawberry] Rash     Current Outpatient Medications   Medication Sig Dispense Refill    meloxicam (MOBIC) 15 MG tablet Take 1 tablet (15 mg total) by mouth daily as needed (low back pain). Change to daily as needed for pain if possible 90 tablet 0    cetirizine (ZYRTEC) 10 MG tablet Take 1 tablet (10 mg total) by mouth daily as needed for Allergies or Rhinitis. 30 tablet 11     No current facility-administered medications for this visit.          ROS  Review of Systems   Constitutional: Positive for activity change. Negative for chills, fever and unexpected weight change.   HENT: Positive for hearing loss, postnasal drip and rhinorrhea. Negative for congestion, ear pain, sore throat and trouble swallowing.    Eyes: Negative for discharge, redness and visual disturbance.   Respiratory: Negative for cough, chest tightness, shortness of breath and wheezing.    Cardiovascular: Negative for chest pain, palpitations and leg swelling.   Gastrointestinal: Negative for abdominal pain, blood in stool, constipation, diarrhea, nausea and vomiting.   Endocrine: Negative for polydipsia, polyphagia and polyuria.   Genitourinary: Negative for decreased urine volume, difficulty urinating, dysuria, hematuria and menstrual problem.   Musculoskeletal: Negative for arthralgias, back pain, joint swelling, myalgias and neck pain.   Skin: Negative for color change and rash.   Neurological: Positive for weakness (legs). Negative for dizziness, light-headedness and headaches.   Psychiatric/Behavioral: Negative for confusion, decreased concentration, dysphoric mood, sleep disturbance and suicidal ideas.           Physical Exam  Vitals:    01/07/19 1329 01/07/19 1349   BP: (!)  "180/120 112/76   Pulse: (!) 112    Temp: 98.5 °F (36.9 °C)    SpO2: 96%    Weight: 83.6 kg (184 lb 3.1 oz)    Height: 5' 4" (1.626 m)     Body mass index is 31.62 kg/m².  Weight: 83.6 kg (184 lb 3.1 oz)   Height: 5' 4" (162.6 cm)   Physical Exam   Constitutional: She is oriented to person, place, and time. She appears well-developed and well-nourished. No distress.   HENT:   Head: Normocephalic and atraumatic.   Right Ear: Tympanic membrane, external ear and ear canal normal.   Left Ear: Tympanic membrane, external ear and ear canal normal.   Nose: Nose normal. Right sinus exhibits no maxillary sinus tenderness and no frontal sinus tenderness. Left sinus exhibits no maxillary sinus tenderness and no frontal sinus tenderness.   Mouth/Throat: Uvula is midline, oropharynx is clear and moist and mucous membranes are normal. No tonsillar exudate.   Eyes: Conjunctivae, EOM and lids are normal. Pupils are equal, round, and reactive to light. No scleral icterus.   Neck: Full passive range of motion without pain. Neck supple. No JVD present. No spinous process tenderness and no muscular tenderness present. Carotid bruit is not present. No thyromegaly present.   Cardiovascular: Normal rate, regular rhythm, S1 normal, S2 normal, normal heart sounds, intact distal pulses and normal pulses. Exam reveals no S3, no S4 and no friction rub.   No murmur heard.  Pulmonary/Chest: Effort normal and breath sounds normal. She has no wheezes. She has no rhonchi. She has no rales.   Abdominal: Soft. Bowel sounds are normal. She exhibits no distension. There is no hepatosplenomegaly. There is no tenderness. There is no rebound and no CVA tenderness.   Musculoskeletal: Normal range of motion. She exhibits no edema or tenderness.        Cervical back: She exhibits no tenderness and no bony tenderness.        Thoracic back: She exhibits no tenderness and no bony tenderness.        Lumbar back: She exhibits no tenderness and no bony tenderness. "   Lymphadenopathy:        Head (right side): No submental and no submandibular adenopathy present.        Head (left side): No submental and no submandibular adenopathy present.     She has no cervical adenopathy.   Neurological: She is alert and oriented to person, place, and time. Coordination normal.   Reflex Scores:       Patellar reflexes are 2+ on the right side and 2+ on the left side.       Achilles reflexes are 2+ on the right side and 2+ on the left side.  Motor 5/5 across , elbows, ankles, knees.  Some right sided hamstring weakness compared the the left.  Sensory grossly intact.  Symmetric facial movements palate elevated symmetrically tongue midline   Skin: Skin is warm and dry. No rash noted. No cyanosis. Nails show no clubbing.   Psychiatric: She has a normal mood and affect. Her speech is normal and behavior is normal. Thought content normal. Cognition and memory are normal.           Health Maintenance       Date Due Completion Date    Pneumococcal Vaccine (Highest Risk) (1 of 3 - PCV13) 04/30/1978 ---    Mammogram 04/20/2020 4/20/2018    Colonoscopy 08/01/2020 8/1/2010 (Done)    Override on 8/1/2010: Done (reportedly normal per patient)    Pap Smear with HPV Cotest 04/20/2021 4/20/2018    Lipid Panel 01/19/2023 1/19/2018    TETANUS VACCINE 04/10/2027 4/10/2017

## 2019-01-08 ENCOUNTER — LAB VISIT (OUTPATIENT)
Dept: LAB | Facility: HOSPITAL | Age: 59
End: 2019-01-08
Attending: INTERNAL MEDICINE
Payer: MEDICARE

## 2019-01-08 ENCOUNTER — PATIENT MESSAGE (OUTPATIENT)
Dept: FAMILY MEDICINE | Facility: CLINIC | Age: 59
End: 2019-01-08

## 2019-01-08 DIAGNOSIS — I10 WHITE COAT SYNDROME WITH DIAGNOSIS OF HYPERTENSION: Chronic | ICD-10-CM

## 2019-01-08 DIAGNOSIS — R74.8 ELEVATED CK: Primary | ICD-10-CM

## 2019-01-08 DIAGNOSIS — E04.2 MULTINODULAR THYROID: ICD-10-CM

## 2019-01-08 DIAGNOSIS — R29.898 WEAKNESS OF BOTH HIPS: ICD-10-CM

## 2019-01-08 DIAGNOSIS — I77.1 TORTUOUS AORTA: Chronic | ICD-10-CM

## 2019-01-08 LAB
ALBUMIN SERPL BCP-MCNC: 3.8 G/DL
ALP SERPL-CCNC: 58 U/L
ALT SERPL W/O P-5'-P-CCNC: 16 U/L
ANION GAP SERPL CALC-SCNC: 6 MMOL/L
AST SERPL-CCNC: 18 U/L
BILIRUB SERPL-MCNC: 0.4 MG/DL
BUN SERPL-MCNC: 11 MG/DL
CALCIUM SERPL-MCNC: 9.5 MG/DL
CHLORIDE SERPL-SCNC: 105 MMOL/L
CHOLEST SERPL-MCNC: 185 MG/DL
CHOLEST/HDLC SERPL: 3.2 {RATIO}
CK SERPL-CCNC: 272 U/L
CO2 SERPL-SCNC: 27 MMOL/L
CREAT SERPL-MCNC: 1 MG/DL
ERYTHROCYTE [SEDIMENTATION RATE] IN BLOOD BY WESTERGREN METHOD: 22 MM/HR
EST. GFR  (AFRICAN AMERICAN): >60 ML/MIN/1.73 M^2
EST. GFR  (NON AFRICAN AMERICAN): >60 ML/MIN/1.73 M^2
GLUCOSE SERPL-MCNC: 104 MG/DL
HDLC SERPL-MCNC: 58 MG/DL
HDLC SERPL: 31.4 %
LDLC SERPL CALC-MCNC: 107.8 MG/DL
NONHDLC SERPL-MCNC: 127 MG/DL
POTASSIUM SERPL-SCNC: 3.8 MMOL/L
PROT SERPL-MCNC: 7.8 G/DL
SODIUM SERPL-SCNC: 138 MMOL/L
TRIGL SERPL-MCNC: 96 MG/DL
TSH SERPL DL<=0.005 MIU/L-ACNC: 1.58 UIU/ML

## 2019-01-08 PROCEDURE — 82550 ASSAY OF CK (CPK): CPT

## 2019-01-08 PROCEDURE — 84443 ASSAY THYROID STIM HORMONE: CPT

## 2019-01-08 PROCEDURE — 36415 COLL VENOUS BLD VENIPUNCTURE: CPT | Mod: PO

## 2019-01-08 PROCEDURE — 80061 LIPID PANEL: CPT

## 2019-01-08 PROCEDURE — 80053 COMPREHEN METABOLIC PANEL: CPT

## 2019-01-08 PROCEDURE — 85652 RBC SED RATE AUTOMATED: CPT

## 2019-01-18 ENCOUNTER — HOSPITAL ENCOUNTER (OUTPATIENT)
Dept: RADIOLOGY | Facility: HOSPITAL | Age: 59
Discharge: HOME | End: 2019-01-18
Attending: INTERNAL MEDICINE
Payer: MEDICARE

## 2019-01-18 ENCOUNTER — PATIENT MESSAGE (OUTPATIENT)
Dept: FAMILY MEDICINE | Facility: CLINIC | Age: 59
End: 2019-01-18

## 2019-01-18 DIAGNOSIS — E04.2 MULTINODULAR THYROID: ICD-10-CM

## 2019-01-18 PROCEDURE — 76536 US EXAM OF HEAD AND NECK: CPT | Mod: TC

## 2019-01-18 PROCEDURE — 76536 US EXAM OF HEAD AND NECK: CPT | Mod: 26,,, | Performed by: RADIOLOGY

## 2019-01-18 PROCEDURE — 76536 US SOFT TISSUE HEAD NECK THYROID: ICD-10-PCS | Mod: 26,,, | Performed by: RADIOLOGY

## 2019-01-23 ENCOUNTER — TELEPHONE (OUTPATIENT)
Dept: NEPHROLOGY | Facility: CLINIC | Age: 59
End: 2019-01-23

## 2019-01-23 ENCOUNTER — PATIENT OUTREACH (OUTPATIENT)
Dept: OTHER | Facility: OTHER | Age: 59
End: 2019-01-23

## 2019-01-23 NOTE — PROGRESS NOTES
Called patient to review readings. LVM.   BP at goal, <130/80 mmHg  Continue monitoring      Last 5 Patient Entered Readings                                      Current 30 Day Average: 116/73     Recent Readings 1/21/2019 1/21/2019 1/14/2019 1/14/2019 1/7/2019    SBP (mmHg) 118 118 105 105 112    DBP (mmHg) 78 78 70 70 76    Pulse - 82 - 71 -

## 2019-01-23 NOTE — PROGRESS NOTES
Ms. Rascon called back. She is doing well. She spent the end of the year in [place]. She will start PT tomorrow per Dr. Hendrickson for lower extremity weakness. She confirms she is not taking blood pressure medication. She would like follow up appointment with Dr. Rosa.     Last 5 Patient Entered Readings                                      Current 30 Day Average: 116/73     Recent Readings 1/21/2019 1/21/2019 1/14/2019 1/14/2019 1/7/2019    SBP (mmHg) 118 118 105 105 112    DBP (mmHg) 78 78 70 70 76    Pulse - 82 - 71 -          BP at goal, <130/80 mmHg  Continue monitoring  Messaged Dr. Rosa staff for appt.

## 2019-01-23 NOTE — TELEPHONE ENCOUNTER
"----- Message from Janelle Guillen PharmD sent at 1/23/2019 10:29 AM CST -----  Regarding: appointment  Patient request follow up appointment with Dr. Rosa.     Lst appointment 12/14/17  Instructions     Please see me back in my clinic in 6 month with renal US         MD Corrie Newton, B[place]   Caller: Self [phone]             No need to see or or labs, she "only" has hypertension. Will need to see her primary care. Follow up in 1 yr.      Called pt no answer l/m in regards to appt   "

## 2019-01-24 ENCOUNTER — CLINICAL SUPPORT (OUTPATIENT)
Dept: REHABILITATION | Facility: HOSPITAL | Age: 59
End: 2019-01-24
Attending: INTERNAL MEDICINE
Payer: MEDICARE

## 2019-01-24 DIAGNOSIS — R68.89 DECREASED STRENGTH, ENDURANCE, AND MOBILITY: ICD-10-CM

## 2019-01-24 DIAGNOSIS — Z74.09 DECREASED STRENGTH, ENDURANCE, AND MOBILITY: ICD-10-CM

## 2019-01-24 DIAGNOSIS — R29.898 BILATERAL LEG WEAKNESS: ICD-10-CM

## 2019-01-24 DIAGNOSIS — R53.1 DECREASED STRENGTH, ENDURANCE, AND MOBILITY: ICD-10-CM

## 2019-01-24 PROCEDURE — 97161 PT EVAL LOW COMPLEX 20 MIN: CPT | Mod: PN | Performed by: PHYSICAL THERAPIST

## 2019-01-24 NOTE — PROGRESS NOTES
[facility]  Physical Therapy Initial Evaluation    Name: Omid Rascon  Clinic Number: [id number]    Therapy Diagnosis:   Encounter Diagnoses   Name Primary?    Bilateral leg weakness     Decreased strength, endurance, and mobility      Physician: Ti Hendrickson MD    Physician Orders: PT Eval and Treat   Medical Diagnosis from Referral: chronic bilateral low back pain without sciatica.  Evaluation Date: 1/24/2019  Authorization Period Expiration: 12/31/2019  Plan of Care Expiration: 4/24/2019  Visit # / Visits authorized: 1/20    Time In: 910  Time Out: 1015  Total Billable Time: 65 minutes    Precautions: Standard    Subjective     History of current condition - Omid reports: she is experiencing weakness in both legs.   Date of onset: 12/15/2018. Awoke with weakness in the thighs. She says the day before she rode in the car to take her son to college which was a 4 hour ride each direction. She says she did not encounter any problems with leg weakness. There was no significant low back pain. She says she is unchanged.   Medical History:   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       Surgical History:   Omid Rascon  has a past surgical history that includes No past surgeries and Laser ablation of the cervix.    Medications:   Omid has a current medication list which includes the following prescription(s): cetirizine and meloxicam.    Allergies:   Review of patient's allergies indicates:   Allergen Reactions    Lactose     Sulfa (sulfonamide antibiotics) Swelling    Latex, natural rubber Rash    Shellfish containing products Rash    Strawberries [strawberry] Rash        Imaging, none:     Prior Therapy: yes for low back pain.    Social History:   lives alone and lives in an adult home.   Says her  moved back to [place].   Occupation: not employed.   Prior Level of Function: limited by low back pain but engaged in usual activities  Current Level of Function:   Personal - no limitation bathing or deressing  Domestic - she says she has not engaged in a lot of cleaning, not able to stand very long to cook   Community - grocery shopping is limited, not able to go to [employer].   Occupation - NA   Recreation / fitness - did walk in the neighborhood prior to onset of weakness. She says she has not been able to perform any exercises for her low back.     Pain:  Current 0/10, worst 0/10, best 0/10   Location: bilateral and weakness in the upper legs  Description: NA  Aggravating Factors: Walking and after about one block the weakness becomes more noticeable.   Easing Factors: lying down and rest    Pts goals: to reduce the weakness and resume the exercises for the low back and swimming.  Says she wants to resume driving.         Objective     Posture: some increase in lumbar lordosis otherwise no deviations   Palpation: unremarkable bilateral thighs and low back for pain. Increased left lumbar PSM tone.   Sensation: intact to light touch.   DTRs: 2+ BLE patella and achilles   Range of Motion/Strength:   Selective Functional Movement Assessment:  FN: functional, non-painful  FP: functional, painful  DP: dysfunctional, painful  DN: dysfunctional, non-painful    Multi-Segmental Flexion: DN  Multi-Segmental Extension: DN  Multi-Segmental Rotation:   Right:DN  Left:DN  Overhead Deep Squat: DN    Hip  Right   Left  Pain/Dysfunction with Movement    AROM PROM MMT AROM PROM MMT    Flexion 90 WNL 3-5 90 WNL 3-5    Extension 10 50 2+/5 10 50 2+/5    Abduction 35 60 3-/5 40 60 3-/5    Internal rotation 30 40 3/5 20 30 3/5    External rotation 20 40 3-/5 10 30 2+/5       Knee  Right   Left  Pain/Dysfunction with Movement    AROM PROM MMT AROM PROM MMT     Flexion wnl wnl  3/5 wnl wnl 3/5    Extension wnl wnl 3+/5 wnl wnl 3+/5         Flexibility:   Hamstrings R - 80    L - 80  Piriformis    R - moderate limitation      L - moderate limitation   Hip flexors / quads  R    L     Gait: Without AD  Analysis: no significant deviations observed.   Bed Mobility:Independent  Transfers: Independent      CMS Impairment/Limitation/Restriction for FOTO LE Survey    Therapist reviewed FOTO scores for Omid Rascon   FOTO documents entered into [employer] - see Media section.    Limitation Score: 66%  Category: Mobility    Current : CL = least 60% but < 80% impaired, limited or restricted  Goal: CK = at least 40% but < 60% impaired, limited or restricted  Discharge: CK = at least 40% but < 60% impaired, limited or restricted         Assessment     Initial Assessment   Izabela Brewer a 57 yo femalereferred to outpatient Physical Therapy with a medical diagnosis of chronic bilateral low back pain . Pt presents with weakness in BLE's. Active movements are compromised. Passive movements are not significantly restricted. She is challenged with standing and walking for extended periods of time.   Pt presents with signs and symptoms consistent with referring diagnosis. Evaluation has determined a decrease in functional status and subjective and objective deficits that can be addressed by physical therapy intervention. Pt does not demonstrate pain limiting functional activities. Decreased flexibility and strength limiting normal movement patterns. Decreased postural strength and awareness.  Decreased participation in functional and recreational activities. Subjective and objective measures are addressed by goals in the plan of care. Patient/family are involved in the development of these goals. Patient/family are educated about current injury and treatment.   Omid Rascon should benefit from therapeutic intervention to treat the symptoms and achieve established goals.Realistic  expectations and hopeful outcomes were discussed. The patient demonstrated and verbalized an understanding of the program and goals as well as expectations. Patient has no barriers to learning. Patient verbalizes understanding of her program and goals  No cultural, spiritual, or educational needs identified.  Pt prognosis is Good.   Pt will benefit from skilled outpatient Physical Therapy to address the deficits stated above and in the chart below, provide pt/family education, and to maximize pt's level of independence.     Plan of care discussed with patient: Yes  Pt's spiritual, cultural and educational needs considered and patient is agreeable to the plan of care and goals as stated below:     Anticipated Barriers for therapy: compliance     Medical Necessity is demonstrated by the following  History  Co-morbidities and personal factors that may impact the plan of care Co-morbidities:   level of undertstanding of current condition and history of low back pain      Personal Factors:   coping style     low   Examination  Body Structures and Functions, activity limitations and participation restrictions that may impact the plan of care Body Regions:   lower extremities    Body Systems:    Musculoskeletal     Participation Restrictions:   None identified    Activity limitations:   Learning and applying knowledge  no deficits    General Tasks and Commands  undertaking multiple tasks  no deficits    Communication  no deficits    Mobility  no deficits    Self care  no deficits    Domestic Life  shopping  doing house work (cleaning house, washing dishes, laundry)    Interactions/Relationships  no deficits    Life Areas  employment    Community and Social Life  no deficits         low   Clinical Presentation stable and uncomplicated low   Decision Making/ Complexity Score: low     Short Term Goals (6 Weeks):      1.Pt to increase strength by a 1/2 grade of muscles test to allow for improvement in functional activities  such as performing chores.  2.Pt to improve range of motion by 25% to allow for improved functional mobility to allow for improvement in IA DLS.   3.Pt to report compliance with HEP and demonstrate proper exercise technique to PT to show competence with self management of condition.  4.Decrease pain by 25% during functional activities.     Long Term Goals (12 Weeks):      1. Increase ROM to allow improved joint biomechanics during functional activities.   2.Increase trunk and lower extremity strength to within normal limits during functional activities.   3. Independent with home exercise program.   4. Full return to functional activities with manageable complaints.  5. Patient to demonstrate improved posture and body mechanics.  6. Decrease pain by 75% during functional activities.        Plan       Plan of care Certification: 1/24/2019 to 4/24/2019.    Outpatient Physical Therapy 2 times weekly for 8 weeks to include the following interventions: Manual Therapy, Patient Education and Therapeutic Exercise, modalities PRN      Therapist: Lit Wen, PT

## 2019-01-25 ENCOUNTER — PATIENT MESSAGE (OUTPATIENT)
Dept: NEPHROLOGY | Facility: CLINIC | Age: 59
End: 2019-01-25

## 2019-01-28 PROBLEM — R53.1 DECREASED STRENGTH, ENDURANCE, AND MOBILITY: Status: ACTIVE | Noted: 2019-01-28

## 2019-01-28 PROBLEM — Z74.09 DECREASED STRENGTH, ENDURANCE, AND MOBILITY: Status: ACTIVE | Noted: 2019-01-28

## 2019-01-28 PROBLEM — R29.898 BILATERAL LEG WEAKNESS: Status: ACTIVE | Noted: 2019-01-28

## 2019-01-28 PROBLEM — R68.89 DECREASED STRENGTH, ENDURANCE, AND MOBILITY: Status: ACTIVE | Noted: 2019-01-28

## 2019-01-28 NOTE — PLAN OF CARE
[facility]  Physical Therapy Initial Evaluation    Name: Omid Rascon  Clinic Number: [id number]    Therapy Diagnosis:   Encounter Diagnoses   Name Primary?    Bilateral leg weakness     Decreased strength, endurance, and mobility      Physician: Ti Hendrickson MD    Physician Orders: PT Eval and Treat   Medical Diagnosis from Referral: chronic bilateral low back pain without sciatica.  Evaluation Date: 1/24/2019  Authorization Period Expiration: 12/31/2019  Plan of Care Expiration: 4/24/2019  Visit # / Visits authorized: 1/20    Time In: 910  Time Out: 1015  Total Billable Time: 65 minutes    Precautions: Standard    Subjective     History of current condition - Omid reports: she is experiencing weakness in both legs.   Date of onset: 12/15/2018. Awoke with weakness in the thighs. She says the day before she rode in the car to take her son to college which was a 4 hour ride each direction. She says she did not encounter any problems with leg weakness. There was no significant low back pain. She says she is unchanged.   Medical History:   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       Surgical History:   Omid Rascon  has a past surgical history that includes No past surgeries and Laser ablation of the cervix.    Medications:   Omid has a current medication list which includes the following prescription(s): cetirizine and meloxicam.    Allergies:   Review of patient's allergies indicates:   Allergen Reactions    Lactose     Sulfa (sulfonamide antibiotics) Swelling    Latex, natural rubber Rash    Shellfish containing products Rash    Strawberries [strawberry] Rash        Imaging, none:     Prior Therapy: yes for low back pain.    Social History:   lives alone and lives in an adult home.   Says her  moved back to [place].   Occupation: not employed.   Prior Level of Function: limited by low back pain but engaged in usual activities  Current Level of Function:   Personal - no limitation bathing or deressing  Domestic - she says she has not engaged in a lot of cleaning, not able to stand very long to cook   Community - grocery shopping is limited, not able to go to [employer].   Occupation - NA   Recreation / fitness - did walk in the neighborhood prior to onset of weakness. She says she has not been able to perform any exercises for her low back.     Pain:  Current 0/10, worst 0/10, best 0/10   Location: bilateral and weakness in the upper legs  Description: NA  Aggravating Factors: Walking and after about one block the weakness becomes more noticeable.   Easing Factors: lying down and rest    Pts goals: to reduce the weakness and resume the exercises for the low back and swimming.  Says she wants to resume driving.         Objective     Posture: some increase in lumbar lordosis otherwise no deviations   Palpation: unremarkable bilateral thighs and low back for pain. Increased left lumbar PSM tone.   Sensation: intact to light touch.   DTRs: 2+ BLE patella and achilles   Range of Motion/Strength:   Selective Functional Movement Assessment:  FN: functional, non-painful  FP: functional, painful  DP: dysfunctional, painful  DN: dysfunctional, non-painful    Multi-Segmental Flexion: DN  Multi-Segmental Extension: DN  Multi-Segmental Rotation:   Right:DN  Left:DN  Overhead Deep Squat: DN    Hip  Right   Left  Pain/Dysfunction with Movement    AROM PROM MMT AROM PROM MMT    Flexion 90 WNL 3-5 90 WNL 3-5    Extension 10 50 2+/5 10 50 2+/5    Abduction 35 60 3-/5 40 60 3-/5    Internal rotation 30 40 3/5 20 30 3/5    External rotation 20 40 3-/5 10 30 2+/5       Knee  Right   Left  Pain/Dysfunction with Movement    AROM PROM MMT AROM PROM MMT     Flexion wnl wnl  3/5 wnl wnl 3/5    Extension wnl wnl 3+/5 wnl wnl 3+/5         Flexibility:   Hamstrings R - 80    L - 80  Piriformis    R - moderate limitation      L - moderate limitation   Hip flexors / quads  R    L     Gait: Without AD  Analysis: no significant deviations observed.   Bed Mobility:Independent  Transfers: Independent      CMS Impairment/Limitation/Restriction for FOTO LE Survey    Therapist reviewed FOTO scores for Omid Rascon   FOTO documents entered into [employer] - see Media section.    Limitation Score: 66%  Category: Mobility    Current : CL = least 60% but < 80% impaired, limited or restricted  Goal: CK = at least 40% but < 60% impaired, limited or restricted  Discharge: CK = at least 40% but < 60% impaired, limited or restricted         Assessment     Initial Assessment   Izabela Brewer a 59 yo femalereferred to outpatient Physical Therapy with a medical diagnosis of chronic bilateral low back pain . Pt presents with weakness in BLE's. Active movements are compromised. Passive movements are not significantly restricted. She is challenged with standing and walking for extended periods of time.   Pt presents with signs and symptoms consistent with referring diagnosis. Evaluation has determined a decrease in functional status and subjective and objective deficits that can be addressed by physical therapy intervention. Pt does not demonstrate pain limiting functional activities. Decreased flexibility and strength limiting normal movement patterns. Decreased postural strength and awareness.  Decreased participation in functional and recreational activities. Subjective and objective measures are addressed by goals in the plan of care. Patient/family are involved in the development of these goals. Patient/family are educated about current injury and treatment.   Omid Rascon should benefit from therapeutic intervention to treat the symptoms and achieve established goals.Realistic  expectations and hopeful outcomes were discussed. The patient demonstrated and verbalized an understanding of the program and goals as well as expectations. Patient has no barriers to learning. Patient verbalizes understanding of her program and goals  No cultural, spiritual, or educational needs identified.  Pt prognosis is Good.   Pt will benefit from skilled outpatient Physical Therapy to address the deficits stated above and in the chart below, provide pt/family education, and to maximize pt's level of independence.     Plan of care discussed with patient: Yes  Pt's spiritual, cultural and educational needs considered and patient is agreeable to the plan of care and goals as stated below:     Anticipated Barriers for therapy: compliance     Medical Necessity is demonstrated by the following  History  Co-morbidities and personal factors that may impact the plan of care Co-morbidities:   level of undertstanding of current condition and history of low back pain      Personal Factors:   coping style     low   Examination  Body Structures and Functions, activity limitations and participation restrictions that may impact the plan of care Body Regions:   lower extremities    Body Systems:    Musculoskeletal     Participation Restrictions:   None identified    Activity limitations:   Learning and applying knowledge  no deficits    General Tasks and Commands  undertaking multiple tasks  no deficits    Communication  no deficits    Mobility  no deficits    Self care  no deficits    Domestic Life  shopping  doing house work (cleaning house, washing dishes, laundry)    Interactions/Relationships  no deficits    Life Areas  employment    Community and Social Life  no deficits         low   Clinical Presentation stable and uncomplicated low   Decision Making/ Complexity Score: low     Short Term Goals (6 Weeks):      1.Pt to increase strength by a 1/2 grade of muscles test to allow for improvement in functional activities  such as performing chores.  2.Pt to improve range of motion by 25% to allow for improved functional mobility to allow for improvement in IA DLS.   3.Pt to report compliance with HEP and demonstrate proper exercise technique to PT to show competence with self management of condition.  4.Decrease pain by 25% during functional activities.     Long Term Goals (12 Weeks):      1. Increase ROM to allow improved joint biomechanics during functional activities.   2.Increase trunk and lower extremity strength to within normal limits during functional activities.   3. Independent with home exercise program.   4. Full return to functional activities with manageable complaints.  5. Patient to demonstrate improved posture and body mechanics.  6. Decrease pain by 75% during functional activities.        Plan       Plan of care Certification: 1/24/2019 to 4/24/2019.    Outpatient Physical Therapy 2 times weekly for 8 weeks to include the following interventions: Manual Therapy, Patient Education and Therapeutic Exercise, modalities PRN      Therapist: Lit Wen, PT

## 2019-02-01 ENCOUNTER — CLINICAL SUPPORT (OUTPATIENT)
Dept: REHABILITATION | Facility: HOSPITAL | Age: 59
End: 2019-02-01
Attending: INTERNAL MEDICINE
Payer: MEDICARE

## 2019-02-01 DIAGNOSIS — R53.1 DECREASED STRENGTH, ENDURANCE, AND MOBILITY: ICD-10-CM

## 2019-02-01 DIAGNOSIS — R29.898 BILATERAL LEG WEAKNESS: Primary | ICD-10-CM

## 2019-02-01 DIAGNOSIS — Z74.09 DECREASED STRENGTH, ENDURANCE, AND MOBILITY: ICD-10-CM

## 2019-02-01 DIAGNOSIS — R68.89 DECREASED STRENGTH, ENDURANCE, AND MOBILITY: ICD-10-CM

## 2019-02-01 PROCEDURE — 97110 THERAPEUTIC EXERCISES: CPT | Mod: PN | Performed by: PHYSICAL THERAPIST

## 2019-02-01 NOTE — PROGRESS NOTES
"              Physical Therapy Daily Treatment Note     Name: Omid Macias [place]  Clinic Number: [id number]    Therapy Diagnosis:   Encounter Diagnoses   Name Primary?    Bilateral leg weakness Yes    Decreased strength, endurance, and mobility      Physician: Ti Hendrickson MD    Visit Date: 2/1/2019    Physician Orders: evaluate and treat   Medical Diagnosis: bilateral leg weakness   Evaluation Date: 1/24/2019  Authorization Period Expiration: 12/31/2019  Plan of Care Certification Period: 4/24/2019  Visit #/Visits authorized: 2/ 20     Time In: 1015  Time Out: 1115  Total Billable Time: 60 minutes    Precautions: Standard    Subjective     Pt reports: she is feelinga little better but still a little "wobbly".      Response to previous treatment: No adverse effects following eval.  Functional change: none at this time    Pain: 0/10  Location: bilateral lower legs and upper legs     Objective     Omid received therapeutic exercises to develop strength, ROM, flexibility and stability for 60 minutes including:        Leg press   Recumbent bike  Upright bike   UE ergometer  Treadmill   Elliptical       THERAPEUTIC EXERCISE 1-1 = 30min  SUPINE  -SLR x15ea.   -SAQ x 30  -heel slides x30  -alternate leg lifts x 15 ea   -bridging x 20  -alternate heel slides x 15      SIDELYING  -clams x 15  -hip abd  X 15    PRONE  -leg lifts x 15     STANDING.  -heel lifts x20  -sit to stand squat   -step ups fwd / lat     SITTING         MANUAL THERAPY  MODALITY  DIRECT EDUCATION:          Assessment     The patient performed the routine as noted. She did not present any challenges with the activities and completed all without much difficulty. She will be progressed with  CC activities.   Omid is progressing well towards her goals.   Pt prognosis is Good.     Pt will continue to benefit from skilled outpatient physical therapy to address the deficits listed in the problem list box on initial evaluation, provide pt/family " education and to maximize pt's level of independence in the home and community environment.     Pt's spiritual, cultural and educational needs considered and pt agreeable to plan of care and goals.     Anticipated barriers to physical therapy: compliance     Goals:   Short Term Goals (6 Weeks):      1.Pt to increase strength by a 1/2 grade of muscles test to allow for improvement in functional activities such as performing chores.  2.Pt to improve range of motion by 25% to allow for improved functional mobility to allow for improvement in IA DLS.   3.Pt to report compliance with HEP and demonstrate proper exercise technique to PT to show competence with self management of condition.  4.Decrease pain by 25% during functional activities.     Long Term Goals (12 Weeks):      1. Increase ROM to allow improved joint biomechanics during functional activities.   2.Increase trunk and lower extremity strength to within normal limits during functional activities.   3. Independent with home exercise program.   4. Full return to functional activities with manageable complaints.  5. Patient to demonstrate improved posture and body mechanics.  6. Decrease pain by 75% during functional activities.          Plan     Plan of care Certification: 1/24/2019 to 4/24/2019.     Outpatient Physical Therapy 2 times weekly for 8 weeks to include the following interventions: Manual Therapy, Patient Education and Therapeutic Exercise, modalities PRN       Lit Wen, PT

## 2019-02-04 NOTE — PROGRESS NOTES
Last 5 Patient Entered Readings                                      Current 30 Day Average: 110/74     Recent Readings 2/3/2019 2/3/2019 1/29/2019 1/29/2019 1/21/2019    SBP (mmHg) 112 112 105 105 118    DBP (mmHg) 76 76 70 70 78    Pulse - 85 - 71 -          Patient spoke with PharmD, NEENA Guillen, at the end of last month and patient stated she is doing well and her BP is remaining controlled. I will follow up again in a few weeks.

## 2019-02-05 ENCOUNTER — CLINICAL SUPPORT (OUTPATIENT)
Dept: REHABILITATION | Facility: HOSPITAL | Age: 59
End: 2019-02-05
Attending: INTERNAL MEDICINE
Payer: MEDICARE

## 2019-02-05 DIAGNOSIS — R68.89 DECREASED STRENGTH, ENDURANCE, AND MOBILITY: ICD-10-CM

## 2019-02-05 DIAGNOSIS — R29.898 BILATERAL LEG WEAKNESS: Primary | ICD-10-CM

## 2019-02-05 DIAGNOSIS — R53.1 DECREASED STRENGTH, ENDURANCE, AND MOBILITY: ICD-10-CM

## 2019-02-05 DIAGNOSIS — G89.29 CHRONIC BILATERAL LOW BACK PAIN WITHOUT SCIATICA: Chronic | ICD-10-CM

## 2019-02-05 DIAGNOSIS — M54.50 CHRONIC BILATERAL LOW BACK PAIN WITHOUT SCIATICA: Chronic | ICD-10-CM

## 2019-02-05 DIAGNOSIS — Z74.09 DECREASED STRENGTH, ENDURANCE, AND MOBILITY: ICD-10-CM

## 2019-02-05 PROCEDURE — 97110 THERAPEUTIC EXERCISES: CPT | Mod: HCNC,PN | Performed by: PHYSICAL THERAPIST

## 2019-02-05 NOTE — PROGRESS NOTES
Physical Therapy Daily Treatment Note     Name: Omid Macias [facility] Number: [id number]    Therapy Diagnosis:   Encounter Diagnoses   Name Primary?    Bilateral leg weakness Yes    Chronic bilateral low back pain without sciatica     Decreased strength, endurance, and mobility      Physician: Ti Hendrickson MD    Visit Date: 2/5/2019    Physician Orders: evaluate and treat   Medical Diagnosis: bilateral leg weakness   Evaluation Date: 1/24/2019  Authorization Period Expiration: 12/31/2019  Plan of Care Certification Period: 4/24/2019  Visit #/Visits authorized: 3 / 20     Time In: 1010  Time Out: 11  Total Billable Time:  minutes    Precautions: Standard    Subjective     Pt reports: she is a little better than the first day.  She says one side, the left, feels weaker than the right. No pain reported.   Response to previous treatment: Felt like I had a workout but maybe a little stronger.   Functional change: Can walk around the yard with her cane.  Pain: 0/10  Location: bilateral lower legs and upper legs     Objective     Omid received therapeutic exercises to develop strength, ROM, flexibility and stability for 60 minutes including:      Leg press   Recumbent bike  Upright bike   UE ergometer  Treadmill   Elliptical       THERAPEUTIC EXERCISE 1-1 = 30min  SUPINE  -SLR x15ea.   -SAQ x 30  -heel slides x30  -alternate leg lifts x 15 ea   -bridging x 20  -alternate heel slides x 15      SIDELYING  -clams x 15 RTB  -hip abd  X 15      PRONE  -leg lifts x 15     STANDING.  -heel lifts x20  -sit to stand squat   -step ups fwd / lat     SITTING         MANUAL THERAPY  MODALITY  DIRECT EDUCATION:          Assessment     Completed all activities as noted. She requires verbal cueing directed to proper exercise technique. Does not demonstrate limitation from pain. Patient does not initiate any activity without direction.   Omid is progressing well towards her goals.   Pt prognosis is Good.    Pt will continue to benefit from skilled outpatient physical therapy to address the deficits listed in the problem list box on initial evaluation, provide pt/family education and to maximize pt's level of independence in the home and community environment.   Pt's spiritual, cultural and educational needs considered and pt agreeable to plan of care and goals.     Anticipated barriers to physical therapy: compliance     Goals:   Short Term Goals (6 Weeks):      1.Pt to increase strength by a 1/2 grade of muscles test to allow for improvement in functional activities such as performing chores.  2.Pt to improve range of motion by 25% to allow for improved functional mobility to allow for improvement in IA DLS.   3.Pt to report compliance with HEP and demonstrate proper exercise technique to PT to show competence with self management of condition.  4.Decrease pain by 25% during functional activities.     Long Term Goals (12 Weeks):      1. Increase ROM to allow improved joint biomechanics during functional activities.   2.Increase trunk and lower extremity strength to within normal limits during functional activities.   3. Independent with home exercise program.   4. Full return to functional activities with manageable complaints.  5. Patient to demonstrate improved posture and body mechanics.  6. Decrease pain by 75% during functional activities.          Plan     Plan of care Certification: 1/24/2019 to 4/24/2019.     Outpatient Physical Therapy 2 times weekly for 8 weeks to include the following interventions: Manual Therapy, Patient Education and Therapeutic Exercise, modalities PRJEREMY Wen, PT

## 2019-02-07 ENCOUNTER — CLINICAL SUPPORT (OUTPATIENT)
Dept: REHABILITATION | Facility: HOSPITAL | Age: 59
End: 2019-02-07
Attending: INTERNAL MEDICINE
Payer: MEDICARE

## 2019-02-07 DIAGNOSIS — R68.89 DECREASED STRENGTH, ENDURANCE, AND MOBILITY: ICD-10-CM

## 2019-02-07 DIAGNOSIS — Z74.09 DECREASED FUNCTIONAL MOBILITY AND ENDURANCE: ICD-10-CM

## 2019-02-07 DIAGNOSIS — R53.1 DECREASED STRENGTH, ENDURANCE, AND MOBILITY: ICD-10-CM

## 2019-02-07 DIAGNOSIS — M54.50 CHRONIC BILATERAL LOW BACK PAIN WITHOUT SCIATICA: ICD-10-CM

## 2019-02-07 DIAGNOSIS — R29.898 BILATERAL LEG WEAKNESS: Primary | ICD-10-CM

## 2019-02-07 DIAGNOSIS — G89.29 CHRONIC BILATERAL LOW BACK PAIN WITHOUT SCIATICA: ICD-10-CM

## 2019-02-07 DIAGNOSIS — M62.81 WEAKNESS OF TRUNK MUSCULATURE: ICD-10-CM

## 2019-02-07 DIAGNOSIS — R29.818 POOR MOTOR CONTROL OF TRUNK: ICD-10-CM

## 2019-02-07 DIAGNOSIS — Z74.09 DECREASED STRENGTH, ENDURANCE, AND MOBILITY: ICD-10-CM

## 2019-02-07 PROCEDURE — 97110 THERAPEUTIC EXERCISES: CPT | Mod: HCNC,PN

## 2019-02-07 NOTE — PROGRESS NOTES
"  Physical Therapy Daily Treatment Note     Name: Omid Macias [place]  Clinic Number: [id number]    Therapy Diagnosis:   Encounter Diagnoses   Name Primary?    Bilateral leg weakness Yes    Chronic bilateral low back pain without sciatica     Decreased strength, endurance, and mobility     Poor motor control of trunk     Weakness of trunk musculature     Decreased functional mobility and endurance      Physician: Ti Hendrickson MD    Visit Date: 2/7/2019    Physician Orders: evaluate and treat   Medical Diagnosis: bilateral leg weakness   Evaluation Date: 1/24/2019  Authorization Period Expiration: 12/31/2019  Plan of Care Certification Period: 4/24/2019  Visit #/Visits authorized: 4 / 20   PTA Visit 1/6     Time In: 1000   Time Out: 1100  Total Billable Time: 30 minutes    Precautions: Standard    Subjective     Pt reports: Feels that things are improving overall.     Response to previous treatment: Felt like I had a workout but maybe a little stronger.   Functional change: Can walk around the yard with her cane.  Pain: 0/10  Location: bilateral lower legs and upper legs     Objective     Omid received therapeutic exercises to develop strength, ROM, flexibility and stability for 60 minutes including:      Leg press   Recumbent bike  Upright bike   UE ergometer  Treadmill   Elliptical       THERAPEUTIC EXERCISE 1-1 = 60 min  SUPINE  -SLR x15ea.   -SAQ x 30  -heel slides x30  -alternate leg lifts x 15 ea   -bridging x 20  -alternate heel slides x 15    +HL hip add x 20     SIDELYING  -clams x 15 RTB  -hip abd  X 15      PRONE  -leg lifts x 15     STANDING.  -heel lifts x20  -sit to stand squat   -step ups fwd / lat     SITTING         MANUAL THERAPY  MODALITY  DIRECT EDUCATION:          Assessment     Trial upright bike at start of today's session, patient felt this was "too soon." Staff progressed strengthening activities today with appropriate level of muscle fatigue noted. Continues to benefit from " skilled therapies for progression of strengthening program.     Omid is progressing well towards her goals.   Pt prognosis is Good.   Pt will continue to benefit from skilled outpatient physical therapy to address the deficits listed in the problem list box on initial evaluation, provide pt/family education and to maximize pt's level of independence in the home and community environment.   Pt's spiritual, cultural and educational needs considered and pt agreeable to plan of care and goals.     Anticipated barriers to physical therapy: compliance     Goals:   Short Term Goals (6 Weeks):      1.Pt to increase strength by a 1/2 grade of muscles test to allow for improvement in functional activities such as performing chores.  2.Pt to improve range of motion by 25% to allow for improved functional mobility to allow for improvement in IA DLS.   3.Pt to report compliance with HEP and demonstrate proper exercise technique to PT to show competence with self management of condition.  4.Decrease pain by 25% during functional activities.     Long Term Goals (12 Weeks):      1. Increase ROM to allow improved joint biomechanics during functional activities.   2.Increase trunk and lower extremity strength to within normal limits during functional activities.   3. Independent with home exercise program.   4. Full return to functional activities with manageable complaints.  5. Patient to demonstrate improved posture and body mechanics.  6. Decrease pain by 75% during functional activities.          Plan     Plan of care Certification: 1/24/2019 to 4/24/2019.     Outpatient Physical Therapy 2 times weekly for 8 weeks to include the following interventions: Manual Therapy, Patient Education and Therapeutic Exercise, modalities TYSON Mejia, PTA

## 2019-02-12 ENCOUNTER — CLINICAL SUPPORT (OUTPATIENT)
Dept: REHABILITATION | Facility: HOSPITAL | Age: 59
End: 2019-02-12
Attending: INTERNAL MEDICINE
Payer: MEDICARE

## 2019-02-12 DIAGNOSIS — M54.50 CHRONIC BILATERAL LOW BACK PAIN WITHOUT SCIATICA: Chronic | ICD-10-CM

## 2019-02-12 DIAGNOSIS — R68.89 DECREASED STRENGTH, ENDURANCE, AND MOBILITY: ICD-10-CM

## 2019-02-12 DIAGNOSIS — G89.29 CHRONIC BILATERAL LOW BACK PAIN WITHOUT SCIATICA: Chronic | ICD-10-CM

## 2019-02-12 DIAGNOSIS — R29.898 BILATERAL LEG WEAKNESS: Primary | ICD-10-CM

## 2019-02-12 DIAGNOSIS — Z74.09 DECREASED STRENGTH, ENDURANCE, AND MOBILITY: ICD-10-CM

## 2019-02-12 DIAGNOSIS — R53.1 DECREASED STRENGTH, ENDURANCE, AND MOBILITY: ICD-10-CM

## 2019-02-12 PROCEDURE — 97110 THERAPEUTIC EXERCISES: CPT | Mod: HCNC,PN | Performed by: PHYSICAL THERAPIST

## 2019-02-12 PROCEDURE — G8978 MOBILITY CURRENT STATUS: HCPCS | Mod: CK,HCNC,PN | Performed by: PHYSICAL THERAPIST

## 2019-02-12 PROCEDURE — G8979 MOBILITY GOAL STATUS: HCPCS | Mod: CK,HCNC,PN | Performed by: PHYSICAL THERAPIST

## 2019-02-12 NOTE — PROGRESS NOTES
Physical Therapy Daily Treatment Note     Name: Omid Macias [facility] Number: [id number]    Therapy Diagnosis:   Encounter Diagnoses   Name Primary?    Bilateral leg weakness Yes    Decreased strength, endurance, and mobility     Chronic bilateral low back pain without sciatica      Physician: Ti Hendrickson MD    Visit Date: 2/12/2019    Physician Orders: evaluate and treat   Medical Diagnosis: bilateral leg weakness   Evaluation Date: 1/24/2019  Authorization Period Expiration: 12/31/2019  Plan of Care Certification Period: 4/24/2019  Visit #/Visits authorized: 5 / 20     Time In: 1025  Time Out: 1130  Total Billable Time: 65  minutes    Precautions: Standard    Subjective     Pt reports: she is experiencing some weakness in the legs but no pain in the low back. She had some pain this past week because she lifted something more than 10# but the pain did not last very long. Legs are feeling a little stronger than last week.   Response to previous treatment: Felt like I had a workout but maybe a little stronger.   Functional change: walking maribell the back yard w/o using the cane and can walk for a longer period of time. Sitting for too long and then getting up causes me to not move too fast when  I get up.   Pain: 0/10  Location: bilateral lower legs and upper legs     Objective     Omid received therapeutic exercises to develop strength, ROM, flexibility and stability for 65 minutes including:      Leg press   Recumbent bike  Upright bike   UE ergometer  Treadmill   Elliptical       THERAPEUTIC EXERCISE 1-1 = 30min  SUPINE  -SLR             x15ea.   -SAQ hold   5 sec x 50   -heel slides x30   -alternate leg lifts w/opp arm lift (Toy soldier) x 15 ea   -bridging x 20  -bridging unilateral x 15   -alternate heel slides x 15 Blue ThX band      SIDELYING  -clams x 15 RTB  -hip abd  X 15  RTB    PRONE  -leg lifts x 20 RTB   -leg swings x 15 RTB     STANDING.  -heel lifts x20  -sit to stand  squat x20  -step ups fwd / lat x12    SITTING         MANUAL THERAPY  MODALITY  DIRECT EDUCATION:          Assessment     The patient completed her routine requiring consistent verbal cueing and occasional tactile cueing to reinforce correct exercise technique. She does not encounter pain and weakness does not interfere with her ability to perform the activities.   Omid is progressing well towards her goals.   Pt prognosis is Good.   Pt will continue to benefit from skilled outpatient physical therapy to address the deficits listed in the problem list box on initial evaluation, provide pt/family education and to maximize pt's level of independence in the home and community environment.   Pt's spiritual, cultural and educational needs considered and pt agreeable to plan of care and goals.     Anticipated barriers to physical therapy: compliance     Goals:   Short Term Goals (6 Weeks):      1.Pt to increase strength by a 1/2 grade of muscles test to allow for improvement in functional activities such as performing chores.  2.Pt to improve range of motion by 25% to allow for improved functional mobility to allow for improvement in IA DLS.   3.Pt to report compliance with HEP and demonstrate proper exercise technique to PT to show competence with self management of condition.  4.Decrease pain by 25% during functional activities.     Long Term Goals (12 Weeks):      1. Increase ROM to allow improved joint biomechanics during functional activities.   2.Increase trunk and lower extremity strength to within normal limits during functional activities.   3. Independent with home exercise program.   4. Full return to functional activities with manageable complaints.  5. Patient to demonstrate improved posture and body mechanics.  6. Decrease pain by 75% during functional activities.       FOTO visit #5  CMS Impairment/Limitation/Restriction for FOTO Hip Survey  Status Limitation G-Code CMS Severity Modifier  Intake 34%  66%  Predicted 55% 45% Goal Status+ CK - At least 40 percent but less than 60 percent  2/12/2019 45% 55% Current Status CK - At least 40 percent but less than 60 percent  D/C Status CK **only report if this is discharge survey  +Based on FOTO predicted change score       Plan     Plan of care Certification: 1/24/2019 to 4/24/2019.     Outpatient Physical Therapy 2 times weekly for 8 weeks to include the following interventions: Manual Therapy, Patient Education and Therapeutic Exercise, modalities PRN       Lit Wen, PT

## 2019-02-14 ENCOUNTER — CLINICAL SUPPORT (OUTPATIENT)
Dept: REHABILITATION | Facility: HOSPITAL | Age: 59
End: 2019-02-14
Attending: INTERNAL MEDICINE
Payer: MEDICARE

## 2019-02-14 DIAGNOSIS — R68.89 DECREASED STRENGTH, ENDURANCE, AND MOBILITY: ICD-10-CM

## 2019-02-14 DIAGNOSIS — Z74.09 DECREASED STRENGTH, ENDURANCE, AND MOBILITY: ICD-10-CM

## 2019-02-14 DIAGNOSIS — R29.898 BILATERAL LEG WEAKNESS: Primary | ICD-10-CM

## 2019-02-14 DIAGNOSIS — R53.1 DECREASED STRENGTH, ENDURANCE, AND MOBILITY: ICD-10-CM

## 2019-02-14 PROCEDURE — 97110 THERAPEUTIC EXERCISES: CPT | Mod: HCNC,PN | Performed by: PHYSICAL THERAPIST

## 2019-02-14 NOTE — PROGRESS NOTES
Physical Therapy Daily Treatment Note     Name: Omid Macias [facility] Number: [id number]    Therapy Diagnosis:   Encounter Diagnoses   Name Primary?    Bilateral leg weakness Yes    Decreased strength, endurance, and mobility      Physician: Ti Hendrickson MD    Visit Date: 2/14/2019    Physician Orders: evaluate and treat   Medical Diagnosis: bilateral leg weakness   Evaluation Date: 1/24/2019  Authorization Period Expiration: 12/31/2019  Plan of Care Certification Period: 4/24/2019  Visit #/Visits authorized: 5 / 20     Time In: 1005  Time Out: 1120  Total Billable Time: 75  minutes    Precautions: Standard BILATERAL HEARING LOSS CHRONIC     Subjective     Pt reports: she is not having any pain.     Response to previous treatment: Soreness the day after the last session but this  Improved. The heaviness is not as great as it use to be.   Functional change: walking faster and without the cane. Can stand from a chair w/o using my hands.   Pain: 0/10  Location: bilateral lower legs and upper legs     Objective     Omid received therapeutic exercises to develop strength, ROM, flexibility and stability for 65 minutes including:      Leg press   Recumbent bike  Upright bike   UE ergometer  Treadmill   Elliptical       THERAPEUTIC EXERCISE 1-1 = 75 min  SUPINE  -SLR             x15ea.   -SAQ hold   5 sec x 50   -heel slides x30   -alternate leg lifts w/opp arm lift (Toy soldier) x 15 ea   -bridging x 20  -bridging unilateral x 15   -alternate heel slides x 15 Blue ThX band      SIDELYING  -clams x 15 RTB  -hip abd  X 15  RTB    PRONE  -leg lifts x 20 RTB   -leg swings x 15 RTB     STANDING.  -heel lifts x20  -sit to stand squat x20  -step ups fwd / lat x12    SITTING         MANUAL THERAPY  MODALITY  DIRECT EDUCATION:    Patient was issued HEP   Printed Home Exercises Reviewed.   Pt demo good understanding of the education provided. Patient demonstrated good return demonstration of  activities    See EMR under Patient Instructions for exercises provided 2/14/2019.       Assessment      The patient performed all activities. She requires significant verbal and tactile cueing to insure correct performance of the exercises. This is attributed to her hearing loss. She recalls the activities but the correct movement pattern is the challenge. She does not encounter pain. Will not be in town for a week due to attending a wedding out of town.    Pt prognosis is Good.   Pt will continue to benefit from skilled outpatient physical therapy to address the deficits listed in the problem list box on initial evaluation, provide pt/family education and to maximize pt's level of independence in the home and community environment.   Pt's spiritual, cultural and educational needs considered and pt agreeable to plan of care and goals.     Anticipated barriers to physical therapy: compliance     Goals:   Short Term Goals (6 Weeks):   1.Pt to increase strength by a 1/2 grade of muscles test to allow for improvement in functional activities such as performing chores.  2.Pt to improve range of motion by 25% to allow for improved functional mobility to allow for improvement in IA DLS.   3.Pt to report compliance with HEP and demonstrate proper exercise technique to PT to show competence with self management of condition.  4.Decrease pain by 25% during functional activities.     Long Term Goals (12 Weeks):   1. Increase ROM to allow improved joint biomechanics during functional activities.   2.Increase trunk and lower extremity strength to within normal limits during functional activities.   3. Independent with home exercise program.   4. Full return to functional activities with manageable complaints.  5. Patient to demonstrate improved posture and body mechanics.  6. Decrease pain by 75% during functional activities.       FOTO visit #5  CMS Impairment/Limitation/Restriction for FOTO Hip Survey  Status Limitation  G-Code CMS Severity Modifier  Intake 34% 66%  Predicted 55% 45% Goal Status+ CK - At least 40 percent but less than 60 percent  2/12/2019 45% 55% Current Status CK - At least 40 percent but less than 60 percent  D/C Status CK **only report if this is discharge survey  +Based on FOTO predicted change score       Plan     Plan of care Certification: 1/24/2019 to 4/24/2019.     Outpatient Physical Therapy 2 times weekly for 8 weeks to include the following interventions: Manual Therapy, Patient Education and Therapeutic Exercise, modalities PRN       Lit Wen, PT

## 2019-02-26 ENCOUNTER — LAB VISIT (OUTPATIENT)
Dept: LAB | Facility: HOSPITAL | Age: 59
End: 2019-02-26
Attending: INTERNAL MEDICINE
Payer: MEDICARE

## 2019-02-26 ENCOUNTER — CLINICAL SUPPORT (OUTPATIENT)
Dept: AUDIOLOGY | Facility: CLINIC | Age: 59
End: 2019-02-26
Payer: MEDICARE

## 2019-02-26 ENCOUNTER — OFFICE VISIT (OUTPATIENT)
Dept: OTOLARYNGOLOGY | Facility: CLINIC | Age: 59
End: 2019-02-26
Payer: MEDICARE

## 2019-02-26 VITALS
WEIGHT: 184.44 LBS | DIASTOLIC BLOOD PRESSURE: 100 MMHG | SYSTOLIC BLOOD PRESSURE: 164 MMHG | BODY MASS INDEX: 31.49 KG/M2 | HEIGHT: 64 IN

## 2019-02-26 DIAGNOSIS — D47.2 MONOCLONAL GAMMOPATHY: ICD-10-CM

## 2019-02-26 DIAGNOSIS — D64.9 ANEMIA, UNSPECIFIED TYPE: ICD-10-CM

## 2019-02-26 DIAGNOSIS — H93.293 ABNORMAL AUDITORY PERCEPTION, BILATERAL: Primary | ICD-10-CM

## 2019-02-26 DIAGNOSIS — H90.3 SENSORINEURAL HEARING LOSS (SNHL) OF BOTH EARS: Primary | ICD-10-CM

## 2019-02-26 LAB
BASOPHILS # BLD AUTO: 0.03 K/UL
BASOPHILS NFR BLD: 0.8 %
DIFFERENTIAL METHOD: ABNORMAL
EOSINOPHIL # BLD AUTO: 0 K/UL
EOSINOPHIL NFR BLD: 1.1 %
ERYTHROCYTE [DISTWIDTH] IN BLOOD BY AUTOMATED COUNT: 14.5 %
FERRITIN SERPL-MCNC: 57 NG/ML
HCT VFR BLD AUTO: 36.7 %
HGB BLD-MCNC: 12.3 G/DL
LYMPHOCYTES # BLD AUTO: 1.6 K/UL
LYMPHOCYTES NFR BLD: 44.1 %
MCH RBC QN AUTO: 26.5 PG
MCHC RBC AUTO-ENTMCNC: 33.5 G/DL
MCV RBC AUTO: 79 FL
MONOCYTES # BLD AUTO: 0.2 K/UL
MONOCYTES NFR BLD: 6.3 %
NEUTROPHILS # BLD AUTO: 1.7 K/UL
NEUTROPHILS NFR BLD: 47.7 %
PLATELET # BLD AUTO: 225 K/UL
PMV BLD AUTO: 9.2 FL
RBC # BLD AUTO: 4.65 M/UL
WBC # BLD AUTO: 3.65 K/UL

## 2019-02-26 PROCEDURE — 85025 COMPLETE CBC W/AUTO DIFF WBC: CPT | Mod: HCNC

## 2019-02-26 PROCEDURE — 92550 PR TYMPANOMETRY AND REFLEX THRESHOLD MEASUREMENTS: ICD-10-PCS | Mod: S$GLB,,, | Performed by: AUDIOLOGIST

## 2019-02-26 PROCEDURE — 3008F PR BODY MASS INDEX (BMI) DOCUMENTED: ICD-10-PCS | Mod: CPTII,S$GLB,, | Performed by: OTOLARYNGOLOGY

## 2019-02-26 PROCEDURE — 92550 TYMPANOMETRY & REFLEX THRESH: CPT | Mod: S$GLB,,, | Performed by: AUDIOLOGIST

## 2019-02-26 PROCEDURE — 99203 PR OFFICE/OUTPT VISIT, NEW, LEVL III, 30-44 MIN: ICD-10-PCS | Mod: S$GLB,,, | Performed by: OTOLARYNGOLOGY

## 2019-02-26 PROCEDURE — 3077F PR MOST RECENT SYSTOLIC BLOOD PRESSURE >= 140 MM HG: ICD-10-PCS | Mod: CPTII,S$GLB,, | Performed by: OTOLARYNGOLOGY

## 2019-02-26 PROCEDURE — 3080F PR MOST RECENT DIASTOLIC BLOOD PRESSURE >= 90 MM HG: ICD-10-PCS | Mod: CPTII,S$GLB,, | Performed by: OTOLARYNGOLOGY

## 2019-02-26 PROCEDURE — 99203 OFFICE O/P NEW LOW 30 MIN: CPT | Mod: S$GLB,,, | Performed by: OTOLARYNGOLOGY

## 2019-02-26 PROCEDURE — 82728 ASSAY OF FERRITIN: CPT | Mod: HCNC

## 2019-02-26 PROCEDURE — 36415 COLL VENOUS BLD VENIPUNCTURE: CPT | Mod: HCNC

## 2019-02-26 PROCEDURE — 83520 IMMUNOASSAY QUANT NOS NONAB: CPT | Mod: 59,HCNC

## 2019-02-26 PROCEDURE — 3008F BODY MASS INDEX DOCD: CPT | Mod: CPTII,S$GLB,, | Performed by: OTOLARYNGOLOGY

## 2019-02-26 PROCEDURE — 3077F SYST BP >= 140 MM HG: CPT | Mod: CPTII,S$GLB,, | Performed by: OTOLARYNGOLOGY

## 2019-02-26 PROCEDURE — 3080F DIAST BP >= 90 MM HG: CPT | Mod: CPTII,S$GLB,, | Performed by: OTOLARYNGOLOGY

## 2019-02-26 NOTE — LETTER
February 26, 2019      Ti Hendrickson MD  [address] [facility] [address] [facility]  [place] [address]           [facility]  [address] [facility] [address]  [place] [address]  Phone: [phone]          Patient: Omid Rascon   MR Number: [id number]   YOB: 1960   Date of Visit: 2/26/2019       Dear Dr. Ti Hendrickson:    Thank you for referring Omid Rascon to me for evaluation. Attached you will find relevant portions of my assessment and plan of care.    If you have questions, please do not hesitate to call me. I look forward to following Omid Rascon along with you.    Sincerely,    Milad Jones MD    Enclosure  CC:  No Recipients    If you would like to receive this communication electronically, please contact [email or web address] or [phone] to request more information on [employer] access.    For providers and/or their staff who would like to refer a patient to [facility], please contact us through our one-stop-shop provider referral line, [facility], at [phone].    If you feel you have received this communication in error or would no longer like to receive these types of communications, please e-mail [email or web address]

## 2019-02-26 NOTE — PATIENT INSTRUCTIONS
I am referring you to  at [facility] on [facility].  He is a neuro otologist and can meet with you to discuss cochlear implant surgery.    You have a profound sensorineural hearing loss.  I reviewed your audiogram from 2016.

## 2019-02-26 NOTE — PROGRESS NOTES
History of Present Illness:  Omid Rascon presents to the clinic today for Hearing Loss (Eustachian Tube Dysfunction)  .  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  She received a hearing aid at that time and does not currently use her hearing aid.  She has difficulty with hearing aid utilization.  She is here today because she feels that she has eustachian tube dysfunction symptoms.  She has had no infections or drainage from her ears and her past history.  She is unaware of the time that she developed her hearing loss.    She had an audiogram done at [facility] in 2016 that revealed a low frequency area of hearing that was usable but with a precipitous drop to the high frequencies to near deafness in both ears.  The hearing loss was symmetrical.  She tells me that she was recommended for a cochlear implant at that time but did not follow through.    I have discussed this with her today and I am referring her to [address] [facility] for evaluation for cochlear implant.    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     Past Surgical History:   Procedure Laterality Date    LASER ABLATION OF THE CERVIX      NO PAST SURGERIES       Family History   Problem Relation Age of Onset    Stroke Father     Heart failure Mother     No Known Problems Sister     No Known Problems Brother     No Known Problems Brother     No Known Problems Brother     No Known Problems Sister     No Known Problems Sister     No Known Problems Sister     No Known Problems Brother     No Known Problems Sister     Diabetes Sister      No Known Problems Maternal Aunt     No Known Problems Maternal Uncle     No Known Problems Paternal Aunt     No Known Problems Paternal Uncle     No Known Problems Maternal Grandmother     No Known Problems Maternal Grandfather     No Known Problems Paternal Grandmother     No Known Problems Paternal Grandfather     Amblyopia Neg Hx     Blindness Neg Hx     Cancer Neg Hx     Cataracts Neg Hx     Glaucoma Neg Hx     Hypertension Neg Hx     Macular degeneration Neg Hx     Retinal detachment Neg Hx     Strabismus Neg Hx     Thyroid disease Neg Hx        Social History     Tobacco Use    Smoking status: Never Smoker    Smokeless tobacco: Never Used   Substance Use Topics    Alcohol use: No    Drug use: No           REVIEW OF SYSTEMS:    General -no  fevers, chills, night sweats, and weight loss     Ears   she has a long history of what sounds like congenital hearing loss that is bilateral.  Cochlear implant was recommended to her at [facility] in 2016 and she did not follow through.    Nose    -allergic rhinitis only    Throat    - no  throat pain      - no  hoarseness    Neck    - no  neck mass    Eyes       - no  recent changes in vision reported     Cardiovascular - she has a history of  hypertension      no  history of cardiovascular disease    Endocrine  - no  diabetes        - no  thyroid problems      Gastrointestinal        - no  gastrointestinal chronic disease       Genitourinary -      She has a history of stage II CKD  Heme/Lymph     She has monoclonal gammopathy and history of anemia.  She has hemoglobin C trait    Musculoskeletal    She has chronic back pain from a know back injury.    Neuro    she has had a history of a frontal mass.    Psych    -no chronic psychiatric illness    Respiratory -   - no  chronic cough or shortness of breath   - no  asthma   - no  COPD      Physical Exam:    Gen    - she is  well-nourished well-developed, and in no acute distress.   Voice - clear, speech  intelligible   Head  - normocephalic without evidence of trauma, face symmetric with good tone   Salivary glands             - Parotid glands : no asymmetry, no lesions or masses    - Submaxillary (submandibular) glands: no asymmetry, no lesions or masses  Skin   - no rashes or lesions of the skin of the head and neck   Ears   - both tympanic membranes, external canals, and auricles are normal;                Hearing is grossly intact.   Nose  - there is no external deformity. There is no rhinorrhea. Septum is midline.               The inferior turbinates are unremarkable. The mucosa is healthy.              No polyps were noted.  Oral cavity    - there are no lesions of the lips, nor of the buccal, lingual, gingival, or               palatal mucosal surfaces. The dentition is adequate.   Oropharynx   - The posterior oropharyngeal wall is clear.               The base of tongue has no lesions.    Neck  - palpation of the neck reveals no lymphadenopathy or masses.               The thyroid is unremarkable. There are no incisions.               No supraclavicular lymphadenopathy.        Assessment:   Omid was seen today for hearing loss.    Diagnoses and all orders for this visit:    Sensorineural hearing loss (SNHL) of both ears  -     Ambulatory referral to ENT        Plan:   I am referring her to , neuro otology, at [facility] on [address] for evaluation for cochlear implant.  The patient had felt that she had fluid in her ears.  Her ear examination was normal bilaterally. Impedance was obtained and it is normal bilaterally.  She has no middle ear effusion.    No Follow-up on file.

## 2019-02-26 NOTE — PROGRESS NOTES
Click the link below to view the audiogram in full:  Document on 2/26/2019 10:26 AM by ELIZABETH Benson.D: Audiogram    Pt seen today for Imminence testing.  Type A tympanograms were obtained, AU.  Acoustiv reflexes were absent in the left ear and present in the right ear at 500 Hz only.

## 2019-02-27 ENCOUNTER — PES CALL (OUTPATIENT)
Dept: ADMINISTRATIVE | Facility: CLINIC | Age: 59
End: 2019-02-27

## 2019-02-27 LAB
KAPPA LC SER QL IA: 2.04 MG/DL
KAPPA LC/LAMBDA SER IA: 1.26
LAMBDA LC SER QL IA: 1.62 MG/DL

## 2019-02-28 ENCOUNTER — CLINICAL SUPPORT (OUTPATIENT)
Dept: REHABILITATION | Facility: HOSPITAL | Age: 59
End: 2019-02-28
Attending: INTERNAL MEDICINE
Payer: MEDICARE

## 2019-02-28 DIAGNOSIS — R29.818 POOR MOTOR CONTROL OF TRUNK: ICD-10-CM

## 2019-02-28 DIAGNOSIS — R68.89 DECREASED STRENGTH, ENDURANCE, AND MOBILITY: ICD-10-CM

## 2019-02-28 DIAGNOSIS — R53.1 DECREASED STRENGTH, ENDURANCE, AND MOBILITY: ICD-10-CM

## 2019-02-28 DIAGNOSIS — Z74.09 DECREASED STRENGTH, ENDURANCE, AND MOBILITY: ICD-10-CM

## 2019-02-28 DIAGNOSIS — R29.898 BILATERAL LEG WEAKNESS: Primary | ICD-10-CM

## 2019-02-28 PROCEDURE — 97110 THERAPEUTIC EXERCISES: CPT | Mod: HCNC,PN | Performed by: PHYSICAL THERAPIST

## 2019-02-28 NOTE — PROGRESS NOTES
Physical Therapy Daily Treatment Note     Name: mOid Macias [facility] Number: [id number]    Therapy Diagnosis:   Encounter Diagnoses   Name Primary?    Bilateral leg weakness Yes    Decreased strength, endurance, and mobility     Poor motor control of trunk      Physician: Ti Hendrickson MD    Visit Date: 2/28/2019    Physician Orders: evaluate and treat   Medical Diagnosis: bilateral leg weakness   Evaluation Date: 1/24/2019  Authorization Period Expiration: 12/31/2019  Plan of Care Certification Period: 4/24/2019  Visit #/Visits authorized: 7 / 20     Time In: 915  Time Out: 1020  Total Billable Time:  65 minutes    Precautions: Standard BILATERAL HEARING LOSS CHRONIC     Subjective     Pt reports: .she has no pain in the back or hips.     Response to previous treatment:     Functional change: doing fine but standing from a chair is still the most difficult thing to do.  Pain: 0/10  Location: bilateral lower legs and upper legs     Objective     Omid received therapeutic exercises to develop strength, ROM, flexibility and stability for 65 minutes including:      Leg press   Recumbent bike  Upright bike 8'  UE ergometer  Treadmill   Elliptical       THERAPEUTIC EXERCISE 1-1 =  65 min  SUPINE  -SLR             x15ea.   -SAQ hold   5 sec x 50 with ball squeeze  -heel slides x30   -alternate leg lifts w/opp arm lift (Toy soldier) x 15 ea   -bridging x 20  -bridging unilateral x 15   -alternate heel slides x 15 Blue ThX band      SIDELYING  -clams x 15 RTB  -hip abd  X 15  RTB    PRONE  -leg lifts x 20 RTB   -leg swings x 15 RTB     STANDING.  -heel lifts x20  -sit to stand squat x20  -step ups fwd / lat x12  +Hip cord routine     SITTING      MANUAL THERAPY  MODALITY  DIRECT EDUCATION:    Patient was issued HEP   Printed Home Exercises Reviewed.   Pt demo good understanding of the education provided. Patient demonstrated good return demonstration of activities    See EMR under Patient  Instructions for exercises provided 2/14/2019.       Assessment      Completed the routine without difficulty. She did not exhibit pain associated with the movements. Functionally she is not restricted in a significant way. Tolerance for all exercises is good. Continue to progress with strengthening of the hip musculature.   Pt prognosis is Good.   Pt will continue to benefit from skilled outpatient physical therapy to address the deficits listed in the problem list box on initial evaluation, provide pt/family education and to maximize pt's level of independence in the home and community environment.   Pt's spiritual, cultural and educational needs considered and pt agreeable to plan of care and goals.     Anticipated barriers to physical therapy: compliance     Goals:   Short Term Goals (6 Weeks):   1.Pt to increase strength by a 1/2 grade of muscles test to allow for improvement in functional activities such as performing chores.  2.Pt to improve range of motion by 25% to allow for improved functional mobility to allow for improvement in IA DLS.   3.Pt to report compliance with HEP and demonstrate proper exercise technique to PT to show competence with self management of condition.  4.Decrease pain by 25% during functional activities.     Long Term Goals (12 Weeks):   1. Increase ROM to allow improved joint biomechanics during functional activities.   2.Increase trunk and lower extremity strength to within normal limits during functional activities.   3. Independent with home exercise program.   4. Full return to functional activities with manageable complaints.  5. Patient to demonstrate improved posture and body mechanics.  6. Decrease pain by 75% during functional activities.     NEXT FOTO AT # 10   FOTO visit #5  CMS Impairment/Limitation/Restriction for FOTO Hip Survey  Status Limitation G-Code CMS Severity Modifier  Intake 34% 66%  Predicted 55% 45% Goal Status+ CK - At least 40 percent but less than 60  percent  2/12/2019 45% 55% Current Status CK - At least 40 percent but less than 60 percent  D/C Status CK **only report if this is discharge survey  +Based on FOTO predicted change score       Plan     Plan of care Certification: 1/24/2019 to 4/24/2019.     Outpatient Physical Therapy 2 times weekly for 8 weeks to include the following interventions: Manual Therapy, Patient Education and Therapeutic Exercise, modalities PRN       Lit Wen, PT

## 2019-03-07 ENCOUNTER — PATIENT OUTREACH (OUTPATIENT)
Dept: OTHER | Facility: OTHER | Age: 59
End: 2019-03-07

## 2019-03-07 NOTE — PROGRESS NOTES
Last 5 Patient Entered Readings                                      Current 30 Day Average: 115/74     Recent Readings 3/6/2019 3/6/2019 3/1/2019 3/1/2019 2/25/2019    SBP (mmHg) 112 112 124 124 124    DBP (mmHg) 76 76 75 75 75    Pulse 85 - - 74 74            Digital Medicine: Health  Follow Up    Left voicemail to follow up with Mrs. Omid Rascon.  Current BP average 115/74 mmHg is at goal, <130/80.    *Patient called back to let me know that she is doing well and feeling great. Patient continues following a low sodium diet and is doing well being off of her BP medication. She denied any other questions or concerns.

## 2019-03-12 ENCOUNTER — CLINICAL SUPPORT (OUTPATIENT)
Dept: REHABILITATION | Facility: HOSPITAL | Age: 59
End: 2019-03-12
Attending: INTERNAL MEDICINE
Payer: MEDICARE

## 2019-03-12 DIAGNOSIS — R29.898 BILATERAL LEG WEAKNESS: Primary | ICD-10-CM

## 2019-03-12 DIAGNOSIS — R53.1 DECREASED STRENGTH, ENDURANCE, AND MOBILITY: ICD-10-CM

## 2019-03-12 DIAGNOSIS — Z74.09 DECREASED STRENGTH, ENDURANCE, AND MOBILITY: ICD-10-CM

## 2019-03-12 DIAGNOSIS — M54.50 CHRONIC BILATERAL LOW BACK PAIN WITHOUT SCIATICA: Chronic | ICD-10-CM

## 2019-03-12 DIAGNOSIS — G89.29 CHRONIC BILATERAL LOW BACK PAIN WITHOUT SCIATICA: Chronic | ICD-10-CM

## 2019-03-12 DIAGNOSIS — R68.89 DECREASED STRENGTH, ENDURANCE, AND MOBILITY: ICD-10-CM

## 2019-03-12 PROCEDURE — 97110 THERAPEUTIC EXERCISES: CPT | Mod: HCNC,PN | Performed by: PHYSICAL THERAPIST

## 2019-03-12 NOTE — PROGRESS NOTES
Physical Therapy Daily Treatment Note     Name: Omid Macias [facility] Number: [id number]    Therapy Diagnosis:   Encounter Diagnoses   Name Primary?    Bilateral leg weakness Yes    Decreased strength, endurance, and mobility     Chronic bilateral low back pain without sciatica      Physician: Ti Hendrickson MD    Visit Date: 3/12/2019    Physician Orders: evaluate and treat   Medical Diagnosis: bilateral leg weakness   Evaluation Date: 1/24/2019  Authorization Period Expiration: 12/31/2019  Plan of Care Certification Period: 4/24/2019  Visit #/Visits authorized: 8 / 20     Time In: 905  Time Out: 1005  Total Billable Time: 60  minutes    Precautions: Standard BILATERAL HEARING LOSS CHRONIC     Subjective     Pt reports: .the back is not hurting and she is feeling better.   Response to previous treatment: Good     Functional change: walking more. She says that she is doing more and not having any problems.     Pain: 0/10  Location: bilateral lower legs and upper legs     Objective       Hip   Right     Left   Pain/Dysfunction with Movement     AROM PROM MMT AROM PROM MMT     Flexion 90   120 WNL  3-/5    4-/5 90   120 WNL  3-5   4-/5     Extension 10     20 50 2+/5  3+/5 10    20 50 2+/5 3+/5     Abduction 35     45 60 3-/5   3+/5 40     45 60 3-/5  3+/5     Internal rotation 30 40 3/5 20 30 3/5     External rotation 20 40 3-/5 10 30 2+/5        Knee   Right     Left   Pain/Dysfunction with Movement     AROM PROM MMT AROM PROM MMT     Flexion wnl wnl  3/5   4-/5 wnl wnl 3/5    4-/5     Extension wnl wnl 3+/5 4-/5 wnl wnl 3+/5  4-/5         Omid received therapeutic exercises to develop strength, ROM, flexibility and stability for 65 minutes including:      Leg press   Recumbent bike  Upright bike 8'  UE ergometer  Treadmill   Elliptical       THERAPEUTIC EXERCISE 1-1 =  60 min  SUPINE  -SLR             x15ea.   -SAQ hold   5 sec x 50 with ball squeeze 3#  -heel slides x30   -alternate  "leg lifts w/opp arm lift (Toy soldier) x 15 ea 3#  -bridging x 20  -bridging unilateral x 15   -alternate knee to chest / opp arm raise  x 15 Blue 3#    -hip abd knees extended GTB 5" hold x 15     SIDELYING  -clams x 15     GTB  -hip abd  X 15  GTB     PRONE  -leg lifts  x 15   GTB   -hydrant  x 15  GTB     STANDING.  -heel lifts x20  -sit to stand squat x20  -step ups fwd / lat x12  +Hip cord routine     SITTING      MANUAL THERAPY  MODALITY  DIRECT EDUCATION:    Patient was issued HEP   Printed Home Exercises Reviewed.   Pt demo good understanding of the education provided. Patient demonstrated good return demonstration of activities    See EMR under Patient Instructions for exercises provided 2/14/2019.       Assessment      Reassessment demonstrates imprpved strength in the LEs  with increased hip mobility in flexion and extension. She does not encounter or demonstrate signs of pain. The patient is progressing well. Will increase CC activity. Headed towards DC.   Pt prognosis is Good.   Pt will continue to benefit from skilled outpatient physical therapy to address the deficits listed in the problem list box on initial evaluation, provide pt/family education and to maximize pt's level of independence in the home and community environment.   Pt's spiritual, cultural and educational needs considered and pt agreeable to plan of care and goals.     Anticipated barriers to physical therapy: compliance     Goals:   Short Term Goals (6 Weeks):   1.Pt to increase strength by a 1/2 grade of muscles test to allow for improvement in functional activities such as performing chores.  2.Pt to improve range of motion by 25% to allow for improved functional mobility to allow for improvement in IA DLS.   3.Pt to report compliance with HEP and demonstrate proper exercise technique to PT to show competence with self management of condition.  4.Decrease pain by 25% during functional activities.     Long Term Goals (12 Weeks): "   1. Increase ROM to allow improved joint biomechanics during functional activities.   2.Increase trunk and lower extremity strength to within normal limits during functional activities.   3. Independent with home exercise program.   4. Full return to functional activities with manageable complaints.  5. Patient to demonstrate improved posture and body mechanics.  6. Decrease pain by 75% during functional activities.       NEXT FOTO AT # 10   FOTO visit #5  CMS Impairment/Limitation/Restriction for FOTO Hip Survey  Status Limitation G-Code CMS Severity Modifier  Intake 34% 66%  Predicted 55% 45% Goal Status+ CK - At least 40 percent but less than 60 percent  2/12/2019 45% 55% Current Status CK - At least 40 percent but less than 60 percent  D/C Status CK **only report if this is discharge survey  +Based on FOTO predicted change score       Plan     Plan of care Certification: 1/24/2019 to 4/24/2019.     Outpatient Physical Therapy 2 times weekly for 8 weeks to include the following interventions: Manual Therapy, Patient Education and Therapeutic Exercise, modalities TYSON Wen, PT

## 2019-03-21 ENCOUNTER — CLINICAL SUPPORT (OUTPATIENT)
Dept: REHABILITATION | Facility: HOSPITAL | Age: 59
End: 2019-03-21
Attending: INTERNAL MEDICINE
Payer: MEDICARE

## 2019-03-21 DIAGNOSIS — R53.1 DECREASED STRENGTH, ENDURANCE, AND MOBILITY: ICD-10-CM

## 2019-03-21 DIAGNOSIS — Z74.09 DECREASED STRENGTH, ENDURANCE, AND MOBILITY: ICD-10-CM

## 2019-03-21 DIAGNOSIS — R68.89 DECREASED STRENGTH, ENDURANCE, AND MOBILITY: ICD-10-CM

## 2019-03-21 DIAGNOSIS — R29.898 BILATERAL LEG WEAKNESS: Primary | ICD-10-CM

## 2019-03-21 PROCEDURE — 97110 THERAPEUTIC EXERCISES: CPT | Mod: HCNC,PN | Performed by: PHYSICAL THERAPIST

## 2019-03-21 PROCEDURE — G8979 MOBILITY GOAL STATUS: HCPCS | Mod: CK,HCNC,PN | Performed by: PHYSICAL THERAPIST

## 2019-03-21 PROCEDURE — G8980 MOBILITY D/C STATUS: HCPCS | Mod: CJ,HCNC,PN | Performed by: PHYSICAL THERAPIST

## 2019-03-21 NOTE — PROGRESS NOTES
Outpatient Therapy Discharge Summary     Name: Omid Macias [facility] Number: [id number]    Therapy Diagnosis:   Encounter Diagnoses   Name Primary?    Bilateral leg weakness Yes    Decreased strength, endurance, and mobility      Physician: Ti Hendrickson MD  Physician Orders: evaluate and treat   Medical Diagnosis: bilateral leg weakness   Evaluation Date: 1/24/2019    Date of Last visit: 3/21/2019  Total Visits Received: 9  Cancelled Visits: 1  No Show Visits: 0    Assessment           Goals:   Short Term Goals (6 Weeks):   1.Pt to increase strength by a 1/2 grade of muscles test to allow for improvement in functional activities such as performing chores.MET  2.Pt to improve range of motion by 25% to allow for improved functional mobility to allow for improvement in ADLS. MET  3.Pt to report compliance with HEP and demonstrate proper exercise technique to PT to show competence with self management of condition. MET   4.Decrease pain by 25% during functional activities. MET     Long Term Goals (12 Weeks):   1. Increase ROM to allow improved joint biomechanics during functional activities. MET  2.Increase trunk and lower extremity strength to within normal limits during functional activities. MET   3. Independent with home exercise program. MET   4. Full return to functional activities with manageable complaints. MET   5. Patient to demonstrate improved posture and body mechanics. MET  6. Decrease pain by 75% during functional activities.  MET       Discharge reason: Patient has met all of his/her goals and Patient has reached the maximum rehab potential for the present time    Plan   This patient is discharged from Physical Therapy

## 2019-03-21 NOTE — PROGRESS NOTES
Physical Therapy Daily Treatment Note     Name: Omid Macias [facility] Number: [id number]    Therapy Diagnosis:   Encounter Diagnoses   Name Primary?    Bilateral leg weakness Yes    Decreased strength, endurance, and mobility      Physician: Ti Hendrickson MD    Visit Date: 3/21/2019    Physician Orders: evaluate and treat   Medical Diagnosis: bilateral leg weakness   Evaluation Date: 1/24/2019  Authorization Period Expiration: 12/31/2019  Plan of Care Certification Period: 4/24/2019  Visit #/Visits authorized: 9 / 20     Time In: 905  Time Out: 1015  Total Billable Time: 70  minutes    Precautions: Standard BILATERAL HEARING LOSS CHRONIC     Subjective     Pt reports: no pain today. Doing much better. Feels prepared to do the exercises and continue at home.       Response to previous treatment: Good     Functional change: walking more. She says that she is doing more and not having any problems.     Pain: 0/10  Location: bilateral lower legs and upper legs     Objective       Hip   Right     Left   Pain/Dysfunction with Movement     AROM PROM MMT AROM PROM MMT     Flexion 90   120 WNL  3-/5    4-/5 90   120 WNL  3-5   4-/5     Extension 10     20 50 2+/5  3+/5 10    20 50 2+/5 3+/5     Abduction 35     45 60 3-/5   3+/5 40     45 60 3-/5  3+/5     Internal rotation 30 40 3/5 20 30 3/5     External rotation 20 40 3-/5 10 30 2+/5        Knee   Right     Left   Pain/Dysfunction with Movement     AROM PROM MMT AROM PROM MMT     Flexion wnl wnl  3/5   4-/5 wnl wnl 3/5    4-/5     Extension wnl wnl 3+/5 4-/5 wnl wnl 3+/5  4-/5         Omid received therapeutic exercises to develop strength, ROM, flexibility and stability for 70 minutes including:      Leg press   Recumbent bike  Upright bike 8'  UE ergometer  Treadmill   Elliptical       THERAPEUTIC EXERCISE 1-1 =  60 min  SUPINE  -SLR             x15ea.   -SAQ hold   5 sec x 50 with ball squeeze 3#  -heel slides x30   -alternate leg lifts  "w/opp arm lift (Toy soldier) x 15 ea 3#  -bridging x 20  -bridging unilateral x 15   -alternate knee to chest / opp arm raise  x 15 Blue 3#    -iso hip  abd / knees extended GTB 5" hold x 15     SIDELYING  -clams x 15     GTB  -hip abd  X 15  GTB     PRONE  -leg lifts  x 15   GTB   -hydrant  x 15  GTB     STANDING.  -heel lifts x20  -sit to stand squat x20  -step ups fwd / lat x12  -step lateral w/cord  -step fwd w/cord     SITTING      MANUAL THERAPY  MODALITY  DIRECT EDUCATION:    Patient was issued HEP 0n 3/21/2019  Printed Home Exercises Reviewed.   Pt demo good understanding of the education provided. Patient demonstrated good return demonstration of activities    See EMR under Patient Instructions for exercises provided 2/14/2019 and 3/20/2019      CMS Impairment/Limitation/Restriction for FOTO Hip Survey  Status Limitation G-Code CMS Severity Modifier  Intake 34% 66%  Predicted 55% 45% Goal Status+ CK - At least 40 percent but less than 60 percent  2/12/2019 45% 55%  3/21/2019 71% 29% D/C Status CJ - At least 20 percent but less than 40 percent     [facility] [place]  FUNCTIONAL STATUS SUMMARY (1/24/2019)  Patient: LEANN GARCIA ([id number]) Primary Body Part: Hip Initial DOS: 1/24/2019  Produced and © [id number] by  [employer]         Assessment        Discharge reason : Met goals and Patient has maximized benefit from PT at this time  Goals Achieved: yes     Goals:   Short Term Goals (6 Weeks):   1.Pt to increase strength by a 1/2 grade of muscles test to allow for improvement in functional activities such as performing chores.MET  2.Pt to improve range of motion by 25% to allow for improved functional mobility to allow for improvement in ADLS. MET  3.Pt to report compliance with HEP and demonstrate proper exercise technique to PT to show competence with self management of condition. MET   4.Decrease pain by 25% during functional activities. MET     Long Term " Goals (12 Weeks):   1. Increase ROM to allow improved joint biomechanics during functional activities. MET  2.Increase trunk and lower extremity strength to within normal limits during functional activities. MET   3. Independent with home exercise program. MET   4. Full return to functional activities with manageable complaints. MET   5. Patient to demonstrate improved posture and body mechanics. MET  6. Decrease pain by 75% during functional activities.  MET          Plan     Plan of care Certification: 1/24/2019 to 4/24/2019.     Outpatient Physical Therapy 2 times weekly for 8 weeks to include the following interventions: Manual Therapy, Patient Education and Therapeutic Exercise, modalities PRN       Lit Wen, PT

## 2019-03-26 ENCOUNTER — OFFICE VISIT (OUTPATIENT)
Dept: OTOLARYNGOLOGY | Facility: CLINIC | Age: 59
End: 2019-03-26
Payer: MEDICARE

## 2019-03-26 ENCOUNTER — CLINICAL SUPPORT (OUTPATIENT)
Dept: AUDIOLOGY | Facility: CLINIC | Age: 59
End: 2019-03-26
Payer: MEDICARE

## 2019-03-26 VITALS — WEIGHT: 182.75 LBS | BODY MASS INDEX: 31.2 KG/M2 | HEIGHT: 64 IN

## 2019-03-26 DIAGNOSIS — H90.5 PROFOUND SENSORINEURAL HEARING LOSS (SNHL): Primary | ICD-10-CM

## 2019-03-26 DIAGNOSIS — H93.293 IMPAIRMENT OF AUDITORY DISCRIMINATION OF BOTH EARS: ICD-10-CM

## 2019-03-26 DIAGNOSIS — H90.3 SENSORINEURAL HEARING LOSS (SNHL), BILATERAL: Primary | ICD-10-CM

## 2019-03-26 PROCEDURE — 99999 PR PBB SHADOW E&M-EST. PATIENT-LVL II: CPT | Mod: PBBFAC,HCNC,, | Performed by: AUDIOLOGIST

## 2019-03-26 PROCEDURE — 99214 PR OFFICE/OUTPT VISIT, EST, LEVL IV, 30-39 MIN: ICD-10-PCS | Mod: HCNC,S$GLB,, | Performed by: OTOLARYNGOLOGY

## 2019-03-26 PROCEDURE — 99999 PR PBB SHADOW E&M-EST. PATIENT-LVL II: ICD-10-PCS | Mod: PBBFAC,HCNC,, | Performed by: AUDIOLOGIST

## 2019-03-26 PROCEDURE — 99999 PR PBB SHADOW E&M-EST. PATIENT-LVL III: CPT | Mod: PBBFAC,HCNC,, | Performed by: OTOLARYNGOLOGY

## 2019-03-26 PROCEDURE — 3008F PR BODY MASS INDEX (BMI) DOCUMENTED: ICD-10-PCS | Mod: HCNC,CPTII,S$GLB, | Performed by: OTOLARYNGOLOGY

## 2019-03-26 PROCEDURE — 92626 EVAL AUD FUNCJ 1ST HOUR: CPT | Mod: HCNC,S$GLB,, | Performed by: AUDIOLOGIST

## 2019-03-26 PROCEDURE — 92557 COMPREHENSIVE HEARING TEST: CPT | Mod: HCNC,S$GLB,, | Performed by: AUDIOLOGIST

## 2019-03-26 PROCEDURE — 99214 OFFICE O/P EST MOD 30 MIN: CPT | Mod: HCNC,S$GLB,, | Performed by: OTOLARYNGOLOGY

## 2019-03-26 PROCEDURE — 92557 PR COMPREHENSIVE HEARING TEST: ICD-10-PCS | Mod: HCNC,S$GLB,, | Performed by: AUDIOLOGIST

## 2019-03-26 PROCEDURE — 3008F BODY MASS INDEX DOCD: CPT | Mod: HCNC,CPTII,S$GLB, | Performed by: OTOLARYNGOLOGY

## 2019-03-26 PROCEDURE — 99999 PR PBB SHADOW E&M-EST. PATIENT-LVL III: ICD-10-PCS | Mod: PBBFAC,HCNC,, | Performed by: OTOLARYNGOLOGY

## 2019-03-26 PROCEDURE — 92626 PR EVAL, AUDITORY FUNCTION, SURG IMPLANT DEVICE, 1ST HR: ICD-10-PCS | Mod: HCNC,S$GLB,, | Performed by: AUDIOLOGIST

## 2019-03-26 NOTE — PROGRESS NOTES
Subjective:       Patient ID: Omid Rascon is a 58 y.o. female.    Chief Complaint: Evaluation for cochlear implant    HPI   58 year old female with history of sensorineural hearing loss who presents for cochlear implant evaluation.  She also has past medical  history as below.  She reports growing up in the [facility] without hearing difficulty.  She first was told that she had hearing loss when she joined the [employer]  during college in 1987.  She was recommended hearing aids at that time, but noticed that she did not enjoy wearing them as loud sounds caused discomfort, and she did not feel that she needed them.  She would have audiology evaluations every few years and had hearing aids adjusted, but continued to use them very infrequently.  She reports having an MRI evaluation previously that did not demonstrate any structural lesions or causes for hearing loss.  She reports that she has difficulty hearing in noisy environments and hearing sounds at home such as the doorbell or phone ringing.  She does not feel that she has difficulty understanding speech in 1 on 1 conversation.      From a general medical standpoint, she is relatively healthy.  She does have a history of a monoclonal gammopathy and a brain mass which was scheduled for surgery through craniotomy which was cancelled due to hypertension and the mass subsequently resolved spontaneously.  Otherwise, she has a history of anemia and Hb C trait, she has received two transfusions previously for this.  She has a history of a lower back injury, but is able to walk without assistance today.  She does have excellent cognitive function and is able to relate a detailed history.    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   HENT: Negative for sinus pressure, trouble swallowing and voice change.    Eyes: Negative for photophobia, pain and discharge.   Respiratory: Negative for apnea, cough, choking and chest tightness.    Cardiovascular: Negative for chest pain and palpitations.   Gastrointestinal: Negative for nausea and vomiting.   Endocrine: Negative for cold intolerance and heat intolerance.   Musculoskeletal: Negative for arthralgias, neck pain and neck stiffness.   Skin: Negative for rash and wound.   Neurological: Negative for seizures, facial asymmetry and speech difficulty.   Hematological: Negative for adenopathy.   Psychiatric/Behavioral: Negative for agitation, behavioral problems and confusion.         Objective:      Physical Exam   Constitutional: She appears well-developed and well-nourished. No distress.   HENT:   Head: Normocephalic.   Right Ear: Tympanic membrane, external ear and ear canal normal. No drainage, swelling or tenderness. No middle ear effusion. Decreased hearing is noted.   Left Ear: Tympanic membrane, external ear and ear canal normal. No drainage, swelling or tenderness.  No middle ear effusion. Decreased hearing is noted.   Nose: Nose normal.   Mouth/Throat: Uvula is midline and oropharynx is clear and moist.   Skin: She is not diaphoretic.           Assessment:       1. Profound sensorineural hearing loss (SNHL)        Plan:       CI evaluation

## 2019-03-27 ENCOUNTER — TELEPHONE (OUTPATIENT)
Dept: OTOLARYNGOLOGY | Facility: CLINIC | Age: 59
End: 2019-03-27

## 2019-03-27 NOTE — PROGRESS NOTES
3/26/2019    Cochlear Implant Evaluation:    Omid Rascon was seen for a cochlear implant evaluation.  She reported a longstanding history of sensorineural hearing loss.  She obtained hearing aids many years ago but does not feel there is significant amplification from traditional hearing aids.  Mrs. Rascon does not use her current hearing aid consistently due to lack of efficacy.      Pure tone threshold testing revealed a severe to profound sensorineural hearing loss bilaterally.  Speech reception thresholds were obtained at 85dBHL for the right ear and 75dBHL for the left ear.  Speech discrimination scores were obtained at 0% for the right ear and 16% for the left ear.  Aided testing was completed in the best aided condition with her personal Phonak power hearing aid in her left ear and a [employer] Phonak power digital hearing aid in her right ear.  Aided responses were obtained in the mild sloping to severe hearing loss range.  [employer] sentences were presented in quiet at 60dBSLP.  Mrs. Rascon scored 0% with both ears aided, 0% for the right ear aided only and 0% for the left ear aided only.  She scored 4% on CNC words in the best aided condition at 60dBSPL.      Based on the results of this comprehensive auditory evaluation, Mrs. Rascon was considered a good candidate for cochlear implantation in her right ear from an audiological standpoint pending medical clearance.  She has a severe to profound sensorineural hearing loss bilaterally with limited benefit from amplification.  Limited benefit from amplification is defined by the test scores of ? 40% correct in the best-aided listening condition on tape recorded tests of open-set sentence cognition.  The cochlear implant is the only accepted medical procedure for the treatment of her degree of sensorineural hearing loss.  Mrs. Rascon meets all current standards for cochlear implantation according to the Medicare guidelines.  The cochlear implant is not provided  primarily for the convenience of the patient, physician, or healthcare provider, but rather to improve hearing and communicative functioning.  The is the most appropriate device that can be safely provided to the patient.  It will be furnished by qualified personnel at [facility].  The benefits and limitations of cochlear implantation were discussed with Mrs. Rascon including the range of possible outcomes.  The rehabilitation requirements and the immunization recommendations were reviewed today.  Mrs. Rascon stated she would like to further consider the different cochlear implant options prior to making a final decision on the  and the procedure.  All of her other questions were answered today.    Recommend:  1.  Otologic evaluation.  2.  Cochlear implantation in her RIGHT ear.  The internal device is yet to be determined.  3.  Follow up programming as needed following cochlear implantation.      NOTE:  Mrs. Rascon called the office the next day to state she will not proceed with cochlear implantation at this time.

## 2019-03-27 NOTE — TELEPHONE ENCOUNTER
Patient called and stated she doesn't want to pursue Cochlear Implant at this time and will contact at a later date

## 2019-05-07 ENCOUNTER — PATIENT OUTREACH (OUTPATIENT)
Dept: OTHER | Facility: OTHER | Age: 59
End: 2019-05-07

## 2019-05-07 NOTE — PROGRESS NOTES
Last 5 Patient Entered Readings                                      Current 30 Day Average: 117/75     Recent Readings 5/5/2019 5/5/2019 4/28/2019 4/28/2019 4/21/2019    SBP (mmHg) 124 124 118 118 105    DBP (mmHg) 74 74 78 78 70    Pulse 75 - 82 - 71            Digital Medicine: Health  Follow Up    Left voicemail to follow up with Mrs. Omid Macias Abiodun.  Current BP average 117/75 mmHg is at goal, <130/80.

## 2019-05-16 ENCOUNTER — PATIENT MESSAGE (OUTPATIENT)
Dept: ADMINISTRATIVE | Facility: OTHER | Age: 59
End: 2019-05-16

## 2019-05-23 ENCOUNTER — PATIENT MESSAGE (OUTPATIENT)
Dept: OTHER | Facility: OTHER | Age: 59
End: 2019-05-23

## 2019-06-11 NOTE — PROGRESS NOTES
Last 5 Patient Entered Readings                                      Current 30 Day Average: 116/71     Recent Readings 6/7/2019 6/7/2019 6/3/2019 6/3/2019 5/28/2019    SBP (mmHg) 105 105 122 122 124    DBP (mmHg) 70 70 65 65 75    Pulse 71 - - 68 74            Digital Medicine: Health  Follow Up    Left voicemail to follow up with Mrs. Omid Macias Abiodun.  Current BP average 116/71 mmHg is at goal, <130/80.

## 2019-07-02 ENCOUNTER — PATIENT OUTREACH (OUTPATIENT)
Dept: OTHER | Facility: OTHER | Age: 59
End: 2019-07-02

## 2019-07-02 NOTE — PROGRESS NOTES
HPI:  Ms. Rascon is currently being followed by the hypertension digital medicine clinic. Transferred from [facility] with questions regarding medications and patient's PharmD, NEENA Guillen is currently out of the office. BP previously controlled w/o BP medication however, reading slightly higher today using another cuff (140/82 &  145/82 mmHg). No change in diet however, patient experiencing higher levels of stress. She is willing to monitor BP regularly and focus on management of stress.     Last 5 Patient Entered Readings                                      Current 30 Day Average: 116/71     Recent Readings 6/20/2019 6/20/2019 6/13/2019 6/13/2019 6/7/2019    SBP (mmHg) 112 112 125 125 105    DBP (mmHg) 76 76 74 74 70    Pulse - 85 65 - 71          Patient denies s/s of hypotension (lightheadedness, dizziness, nausea, fatigue) associated with low readings. Instructed patient to inform me if this occurs, patient confirms understanding.    Patient denies s/s of hypertension (SOB, CP, severe headaches, changes in vision) associated with high readings. Instructed patient to go to the ED if BP >180/110 and accompanied by hypertensive s/s, patient confirms understanding.    Assessment:  Patient's current 30-day average is at goal of <130/80 mmHg though last reading taken on 6/20/19.     Plan:  Asked patient to submit more frequent readings for adequate assessment of BP control.  Briefly reviewed stress management techniques with patient.   Will have her review readings with PharmSONAM Guillen to discuss pharmacotherapy if needed.   Patients health , Sabina Kessler, will follow-up as scheduled.    Continue monitoring and note routed to patient's care team.     Current medication regimen: Not Applicable    Patient has my contact information and knows to call with any concerns or clinical changes.

## 2019-07-17 NOTE — PROGRESS NOTES
Last 5 Patient Entered Readings                                      Current 30 Day Average: 115/77     Recent Readings 7/9/2019 7/9/2019 6/20/2019 6/20/2019 6/13/2019    SBP (mmHg) 118 112 112 112 125    DBP (mmHg) 78 76 76 76 74    Pulse - 85 - 85 65          Digital Medicine: Health  Follow Up    Lifestyle Modifications:    1.Dietary Modifications (Sodium intake <2,000mg/day, food labels, dining out): Patient continues monitoring her sodium intake. She denies any changes that would cause an increase in salt intake.     2.Physical Activity: Deferred    3.Medication Therapy: Patient is not on any BP medication at this time.     4.Patient has the following medication side effects/concerns: None  (Frequency/Alleviating factors/Precipitating factors, etc.)     Patient and I's conversation mainly consisted of the worries she is having for her son and his health right now.   Patient requested hiatus for 2-3 weeks while she gets everything situated.  She will need help setting up her cuff with her new phone.   Follow up with Misha Omid Colleen Rascon completed. No further questions or concerns. Will continue to follow up to achieve health goals.

## 2019-08-06 ENCOUNTER — PES CALL (OUTPATIENT)
Dept: ADMINISTRATIVE | Facility: CLINIC | Age: 59
End: 2019-08-06

## 2019-08-20 ENCOUNTER — OFFICE VISIT (OUTPATIENT)
Dept: FAMILY MEDICINE | Facility: CLINIC | Age: 59
End: 2019-08-20
Payer: MEDICARE

## 2019-08-20 VITALS
HEIGHT: 65 IN | SYSTOLIC BLOOD PRESSURE: 130 MMHG | HEART RATE: 89 BPM | TEMPERATURE: 98 F | BODY MASS INDEX: 30.54 KG/M2 | DIASTOLIC BLOOD PRESSURE: 88 MMHG | WEIGHT: 183.31 LBS | OXYGEN SATURATION: 98 %

## 2019-08-20 DIAGNOSIS — M54.50 CHRONIC BILATERAL LOW BACK PAIN WITHOUT SCIATICA: Primary | Chronic | ICD-10-CM

## 2019-08-20 DIAGNOSIS — G89.29 CHRONIC BILATERAL LOW BACK PAIN WITHOUT SCIATICA: Primary | Chronic | ICD-10-CM

## 2019-08-20 DIAGNOSIS — F33.41 RECURRENT MAJOR DEPRESSIVE DISORDER, IN PARTIAL REMISSION: ICD-10-CM

## 2019-08-20 PROCEDURE — 3079F PR MOST RECENT DIASTOLIC BLOOD PRESSURE 80-89 MM HG: ICD-10-PCS | Mod: HCNC,CPTII,S$GLB, | Performed by: INTERNAL MEDICINE

## 2019-08-20 PROCEDURE — 99214 PR OFFICE/OUTPT VISIT, EST, LEVL IV, 30-39 MIN: ICD-10-PCS | Mod: HCNC,S$GLB,, | Performed by: INTERNAL MEDICINE

## 2019-08-20 PROCEDURE — 3008F PR BODY MASS INDEX (BMI) DOCUMENTED: ICD-10-PCS | Mod: HCNC,CPTII,S$GLB, | Performed by: INTERNAL MEDICINE

## 2019-08-20 PROCEDURE — 99999 PR PBB SHADOW E&M-EST. PATIENT-LVL III: CPT | Mod: PBBFAC,HCNC,, | Performed by: INTERNAL MEDICINE

## 2019-08-20 PROCEDURE — 99999 PR PBB SHADOW E&M-EST. PATIENT-LVL III: ICD-10-PCS | Mod: PBBFAC,HCNC,, | Performed by: INTERNAL MEDICINE

## 2019-08-20 PROCEDURE — 99214 OFFICE O/P EST MOD 30 MIN: CPT | Mod: HCNC,S$GLB,, | Performed by: INTERNAL MEDICINE

## 2019-08-20 PROCEDURE — 3075F SYST BP GE 130 - 139MM HG: CPT | Mod: HCNC,CPTII,S$GLB, | Performed by: INTERNAL MEDICINE

## 2019-08-20 PROCEDURE — 3079F DIAST BP 80-89 MM HG: CPT | Mod: HCNC,CPTII,S$GLB, | Performed by: INTERNAL MEDICINE

## 2019-08-20 PROCEDURE — 3008F BODY MASS INDEX DOCD: CPT | Mod: HCNC,CPTII,S$GLB, | Performed by: INTERNAL MEDICINE

## 2019-08-20 PROCEDURE — 3075F PR MOST RECENT SYSTOLIC BLOOD PRESS GE 130-139MM HG: ICD-10-PCS | Mod: HCNC,CPTII,S$GLB, | Performed by: INTERNAL MEDICINE

## 2019-08-20 RX ORDER — TIZANIDINE 2 MG/1
TABLET ORAL
Qty: 30 TABLET | Refills: 0 | Status: SHIPPED | OUTPATIENT
Start: 2019-08-20 | End: 2020-10-08

## 2019-08-20 RX ORDER — CITALOPRAM 20 MG/1
TABLET, FILM COATED ORAL
Refills: 0 | COMMUNITY
Start: 2019-08-12 | End: 2020-10-08

## 2019-08-20 RX ORDER — TRAZODONE HYDROCHLORIDE 50 MG/1
TABLET ORAL
Refills: 0 | COMMUNITY
Start: 2019-08-12 | End: 2020-10-08

## 2019-08-20 NOTE — PATIENT INSTRUCTIONS
Use some heat to the low back for 20 minutes 2-3 x a day.      Stay on the meloxicam once a day.  I would take this in the AM.      We are going to use tizanidine at night as needed.     Continue with the home physical therapy exercises.

## 2019-08-20 NOTE — PROGRESS NOTES
Assessment & Plan  Problem List Items Addressed This Visit        Psychiatric    Recurrent major depressive disorder, in partial remission    Current Assessment & Plan     Referral placed.  Already known to [facility]         Relevant Orders    Ambulatory referral to Psychiatry       Orthopedic    Chronic bilateral low back pain without sciatica - Primary (Chronic)    Overview     MRI 01/2018.  Lumbar degenerative changes contributing to mild bilateral neural foraminal narrowing at L4-L5 and L5-S1.  No spinal canal stenosis.         Current Assessment & Plan     Continue meloxicam, heat.  Will add tizanidine at night.           Relevant Medications    tiZANidine (ZANAFLEX) 2 MG tablet            Health Maintenance reviewed, deferred.    Follow-up: Follow up if symptoms worsen or fail to improve.  ______________________________________________________________________    Chief Complaint  Chief Complaint   Patient presents with    Back Pain       HPI  Omid Rascon is a 59 y.o. female with medical diagnoses as listed in the medical history and problem list that presents for back pain follow up.  Pt is known to me with her last appointment  01/2019.      She slipped in a store yesterday which irritated her chronic back pain.  She reports pain was 8/10.  Took meloxicam last night.  No red flag symptoms for low back pain.  Pain is sharp in nature.      She reports that she has not been sleeping well.  Her depression has not been doing well since her son got sick 2 months ago.  Has been having more panic attacks.  She was previously followed at [facility].  She was able to see them again last week.  She needs a referral from us for this.      PAST MEDICAL HISTORY:  Past Medical History:   Diagnosis Date    Anemia     CKD (chronic kidney disease), stage II 5/16/2016    Depression     Hemoglobin C trait     Hypertension     Impaired hearing     Lumbago 8/10/2015     Monoclonal gammopathy 11/10/2016    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       PAST SURGICAL HISTORY:  Past Surgical History:   Procedure Laterality Date    LASER ABLATION OF THE CERVIX      NO PAST SURGERIES         SOCIAL HISTORY:  Social History     Socioeconomic History    Marital status:      Spouse name: Not on file    Number of children: Not on file    Years of education: Not on file    Highest education level: Not on file   Occupational History    Not on file   Social Needs    Financial resource strain: Not on file    Food insecurity:     Worry: Not on file     Inability: Not on file    Transportation needs:     Medical: Not on file     Non-medical: Not on file   Tobacco Use    Smoking status: Never Smoker    Smokeless tobacco: Never Used   Substance and Sexual Activity    Alcohol use: No    Drug use: No    Sexual activity: Yes     Partners: Male     Birth control/protection: See Surgical Hx   Lifestyle    Physical activity:     Days per week: Not on file     Minutes per session: Not on file    Stress: Not on file   Relationships    Social connections:     Talks on phone: Not on file     Gets together: Not on file     Attends Taoism service: Not on file     Active member of club or organization: Not on file     Attends meetings of clubs or organizations: Not on file     Relationship status: Not on file   Other Topics Concern    Not on file   Social History Narrative    Not on file       FAMILY HISTORY:  Family History   Problem Relation Age of Onset    Stroke Father     Heart failure Mother     No Known Problems Sister     No Known Problems Brother     No Known Problems Brother     No Known Problems Brother     No Known Problems Sister     No Known Problems Sister     No Known Problems Sister     No Known Problems Brother     No Known Problems Sister     Diabetes Sister     No Known Problems Maternal Aunt     No Known Problems Maternal Uncle     No Known Problems Paternal  Aunt     No Known Problems Paternal Uncle     No Known Problems Maternal Grandmother     No Known Problems Maternal Grandfather     No Known Problems Paternal Grandmother     No Known Problems Paternal Grandfather     Amblyopia Neg Hx     Blindness Neg Hx     Cancer Neg Hx     Cataracts Neg Hx     Glaucoma Neg Hx     Hypertension Neg Hx     Macular degeneration Neg Hx     Retinal detachment Neg Hx     Strabismus Neg Hx     Thyroid disease Neg Hx        ALLERGIES AND MEDICATIONS: updated and reviewed.  Review of patient's allergies indicates:   Allergen Reactions    Lactose     Sulfa (sulfonamide antibiotics) Swelling    Latex, natural rubber Rash    Shellfish containing products Rash    Strawberries [strawberry] Rash     Current Outpatient Medications   Medication Sig Dispense Refill    cetirizine (ZYRTEC) 10 MG tablet Take 1 tablet (10 mg total) by mouth daily as needed for Allergies or Rhinitis. 30 tablet 11    citalopram (CELEXA) 20 MG tablet   0    meloxicam (MOBIC) 15 MG tablet Take 1 tablet (15 mg total) by mouth daily as needed (low back pain). Change to daily as needed for pain if possible 90 tablet 0    traZODone (DESYREL) 50 MG tablet TK 1/2 T PO QD HS PRF SLP  0    tiZANidine (ZANAFLEX) 2 MG tablet Take 1-2 tabs PO QHS PRN muscle pain/spasms.  Do not mix with trazodone 30 tablet 0     No current facility-administered medications for this visit.          ROS  Review of Systems   Constitutional: Negative for chills, fever and unexpected weight change.   HENT: Negative for congestion, ear pain, hearing loss, rhinorrhea, sore throat and trouble swallowing.    Eyes: Negative for discharge, redness and visual disturbance.   Respiratory: Negative for cough, chest tightness, shortness of breath and wheezing.    Cardiovascular: Negative for chest pain, palpitations and leg swelling.   Gastrointestinal: Negative for abdominal pain, constipation, diarrhea, nausea and vomiting.   Endocrine:  "Negative for polydipsia, polyphagia and polyuria.   Genitourinary: Negative for decreased urine volume, dysuria and hematuria.   Musculoskeletal: Positive for back pain. Negative for arthralgias, joint swelling and myalgias.   Skin: Negative for color change and rash.   Neurological: Negative for dizziness, weakness, light-headedness and headaches.   Psychiatric/Behavioral: Positive for dysphoric mood and sleep disturbance. Negative for decreased concentration and suicidal ideas.           Physical Exam  Vitals:    08/20/19 1439   BP: 130/88   Pulse: 89   Temp: 98.3 °F (36.8 °C)   SpO2: 98%   Weight: 83.2 kg (183 lb 5 oz)   Height: 5' 5" (1.651 m)    Body mass index is 30.5 kg/m².  Weight: 83.2 kg (183 lb 5 oz)   Height: 5' 5" (165.1 cm)   Physical Exam   Constitutional: She is oriented to person, place, and time. She appears well-developed and well-nourished. No distress.   HENT:   Head: Normocephalic and atraumatic.   Eyes: Pupils are equal, round, and reactive to light. Conjunctivae, EOM and lids are normal. No scleral icterus.   Neck: Full passive range of motion without pain. Neck supple. No JVD present. Carotid bruit is not present. No thyromegaly present.   Cardiovascular: Normal rate, regular rhythm, normal heart sounds, intact distal pulses and normal pulses. Exam reveals no S3, no S4 and no friction rub.   No murmur heard.  Pulmonary/Chest: Effort normal and breath sounds normal. She has no wheezes. She has no rhonchi. She has no rales.   Abdominal: Soft. Bowel sounds are normal. There is no tenderness.   Musculoskeletal: She exhibits no edema.        Cervical back: She exhibits no tenderness and no bony tenderness.        Thoracic back: She exhibits no tenderness and no bony tenderness.        Lumbar back: She exhibits tenderness and spasm. She exhibits no bony tenderness.        Back:    Lymphadenopathy:        Head (right side): No submental and no submandibular adenopathy present.        Head (left " side): No submental and no submandibular adenopathy present.     She has no cervical adenopathy.   Neurological: She is alert and oriented to person, place, and time.   Motor grossly intact.  Sensory grossly intact.  Symmetric facial movements palate elevated symmetrically tongue midline   Skin: Skin is warm and dry. No rash noted.   Psychiatric: She has a normal mood and affect. Her speech is normal and behavior is normal. Thought content normal.           Health Maintenance       Date Due Completion Date    Pneumococcal Vaccine (Highest Risk) (1 of 3 - PCV13) 04/30/1978 ---    Shingles Vaccine (1 of 2) 04/30/2009 ---    Influenza Vaccine (1) 09/01/2019 10/11/2018    Mammogram 04/20/2020 4/20/2018    Colonoscopy 08/01/2020 8/1/2010 (Done)    Override on 8/1/2010: Done (reportedly normal per patient)    Pap Smear with HPV Cotest 04/20/2021 4/20/2018    Lipid Panel 01/08/2024 1/8/2019    TETANUS VACCINE 04/10/2027 4/10/2017

## 2019-08-27 DIAGNOSIS — D64.9 ANEMIA, UNSPECIFIED TYPE: ICD-10-CM

## 2019-08-27 DIAGNOSIS — D47.2 MGUS (MONOCLONAL GAMMOPATHY OF UNKNOWN SIGNIFICANCE): Primary | ICD-10-CM

## 2019-08-30 ENCOUNTER — OFFICE VISIT (OUTPATIENT)
Dept: OBSTETRICS AND GYNECOLOGY | Facility: CLINIC | Age: 59
End: 2019-08-30
Payer: MEDICARE

## 2019-08-30 VITALS
HEIGHT: 65 IN | DIASTOLIC BLOOD PRESSURE: 120 MMHG | SYSTOLIC BLOOD PRESSURE: 196 MMHG | WEIGHT: 182.13 LBS | BODY MASS INDEX: 30.34 KG/M2

## 2019-08-30 DIAGNOSIS — Z12.39 SCREENING FOR BREAST CANCER: ICD-10-CM

## 2019-08-30 DIAGNOSIS — Z01.419 WELL WOMAN EXAM WITH ROUTINE GYNECOLOGICAL EXAM: Primary | ICD-10-CM

## 2019-08-30 PROCEDURE — 99999 PR PBB SHADOW E&M-EST. PATIENT-LVL III: ICD-10-PCS | Mod: PBBFAC,HCNC,, | Performed by: OBSTETRICS & GYNECOLOGY

## 2019-08-30 PROCEDURE — 99999 PR PBB SHADOW E&M-EST. PATIENT-LVL III: CPT | Mod: PBBFAC,HCNC,, | Performed by: OBSTETRICS & GYNECOLOGY

## 2019-08-30 PROCEDURE — G0101 CA SCREEN;PELVIC/BREAST EXAM: HCPCS | Mod: HCNC,S$GLB,, | Performed by: OBSTETRICS & GYNECOLOGY

## 2019-08-30 PROCEDURE — G0101 PR CA SCREEN;PELVIC/BREAST EXAM: ICD-10-PCS | Mod: HCNC,S$GLB,, | Performed by: OBSTETRICS & GYNECOLOGY

## 2019-08-30 NOTE — PROGRESS NOTES
History & Physical  Gynecology      SUBJECTIVE:     Chief Complaint: Well Woman       History of Present Illness:  Annual Exam-Postmenopausal  Patient presents for annual exam. The patient has no complaints today. Patient denies post-menopausal vaginal bleeding.   The patient is not currently sexually active. The patient is not taking hormone replacement therapy.  The patient participates in regular exercise: no.  She does not smoke.     GYN screening history: last pap: approximate date 2018 pap/hpv and was normal  Mammogram history: 2018  Colonoscopy history: per patient, normal      Review of patient's allergies indicates:   Allergen Reactions    Lactose     Sulfa (sulfonamide antibiotics) Swelling    Latex, natural rubber Rash    Shellfish containing products Rash    Strawberries [strawberry] Rash       Past Medical History:   Diagnosis Date    Anemia     CKD (chronic kidney disease), stage II 2016    Depression     Hemoglobin C trait     Hypertension     Impaired hearing     Lumbago 8/10/2015    Monoclonal gammopathy 11/10/2016    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     Past Surgical History:   Procedure Laterality Date    ABLATION      LASER ABLATION OF THE CERVIX      NO PAST SURGERIES       OB History        4    Para   2    Term   2            AB   2    Living           SAB   2    TAB        Ectopic        Multiple        Live Births                   Family History   Problem Relation Age of Onset    Stroke Father     Heart failure Mother     No Known Problems Sister     No Known Problems Brother     No Known Problems Brother     No Known Problems Brother     No Known Problems Sister     No Known Problems Sister     No Known Problems Sister     No Known Problems Brother     No Known Problems Sister     Diabetes Sister     No Known Problems Maternal Aunt     No Known Problems Maternal Uncle     No Known Problems Paternal Aunt     No Known Problems Paternal Uncle      No Known Problems Maternal Grandmother     No Known Problems Maternal Grandfather     No Known Problems Paternal Grandmother     No Known Problems Paternal Grandfather     Amblyopia Neg Hx     Blindness Neg Hx     Cancer Neg Hx     Cataracts Neg Hx     Glaucoma Neg Hx     Hypertension Neg Hx     Macular degeneration Neg Hx     Retinal detachment Neg Hx     Strabismus Neg Hx     Thyroid disease Neg Hx      Social History     Tobacco Use    Smoking status: Never Smoker    Smokeless tobacco: Never Used   Substance Use Topics    Alcohol use: No    Drug use: No       Current Outpatient Medications   Medication Sig    cetirizine (ZYRTEC) 10 MG tablet Take 1 tablet (10 mg total) by mouth daily as needed for Allergies or Rhinitis.    citalopram (CELEXA) 20 MG tablet     meloxicam (MOBIC) 15 MG tablet Take 1 tablet (15 mg total) by mouth daily as needed (low back pain). Change to daily as needed for pain if possible    tiZANidine (ZANAFLEX) 2 MG tablet Take 1-2 tabs PO QHS PRN muscle pain/spasms.  Do not mix with trazodone    traZODone (DESYREL) 50 MG tablet TK 1/2 T PO QD HS PRF SLP     No current facility-administered medications for this visit.        Review of Systems:  Review of Systems   Constitutional: Negative for appetite change, fever and unexpected weight change.   Respiratory: Negative for shortness of breath.    Cardiovascular: Negative for chest pain.   Gastrointestinal: Negative for nausea and vomiting.   Genitourinary: Negative for dyspareunia, frequency, genital sores, pelvic pain, urgency, vaginal bleeding, vaginal discharge, vaginal pain, urinary incontinence, postcoital bleeding, postmenopausal bleeding and vaginal odor.        OBJECTIVE:     Physical Exam:  Physical Exam   Constitutional: She is oriented to person, place, and time. She appears well-developed and well-nourished.   Neck: Normal range of motion. Neck supple. No tracheal deviation present. Thyromegaly (appropriate  for history) present.   Cardiovascular: Normal rate, regular rhythm and normal heart sounds.   Pulmonary/Chest: Effort normal and breath sounds normal. Right breast exhibits no inverted nipple, no mass, no nipple discharge, no skin change and no tenderness. Left breast exhibits no inverted nipple, no mass, no nipple discharge, no skin change and no tenderness. Breasts are symmetrical.   Abdominal: Soft.   Genitourinary: Vagina normal and uterus normal. No labial fusion. There is no rash, tenderness, lesion or injury on the right labia. There is no rash, tenderness, lesion or injury on the left labia. Uterus is not deviated, not enlarged, not fixed and not tender. Cervix exhibits no motion tenderness, no discharge and no friability. Right adnexum displays no mass, no tenderness and no fullness. Left adnexum displays no mass, no tenderness and no fullness. No erythema, tenderness or bleeding in the vagina. No foreign body in the vagina. No signs of injury around the vagina. No vaginal discharge found.   Genitourinary Comments: Urethra: normal appearing urethra with no masses, tenderness or lesions  Urethral meatus: normal size, anterior vaginal wall with no prolapse, no lesions   Neurological: She is alert and oriented to person, place, and time.   Psychiatric: She has a normal mood and affect. Her behavior is normal. Judgment and thought content normal.   Nursing note and vitals reviewed.      Chaperoned by: Jamil    ASSESSMENT:       ICD-10-CM ICD-9-CM    1. Well woman exam with routine gynecological exam Z01.419 V72.31    2. Screening for breast cancer Z12.31 V76.10 Mammo Digital Screening Bilat w/ Mookie          Plan:      Omid was seen today for well woman.    Diagnoses and all orders for this visit:    Well woman exam with routine gynecological exam    Screening for breast cancer  -     Mammo Digital Screening Bilat w/ Mookie; Future        Orders Placed This Encounter   Procedures    Mammo Digital Screening  Bilat w/ Mookie       Well Woman:   - Pap smear: up to date  - Mammogram: ordered  - Colonoscopy: normal recently per patient  - Dexa: due age 64  - Immunizations: with pcp  - Labs: with pcp  - Exercise counseling provided    HTN:  - white coat syndrome; home BP cuff monitoring reviewed; normal at home  - symptoms of headache, blurry vision, weakness, etc. Patient will check BP when she gets home today    Follow up in one year for annual, or prn.    Juhi Polanco

## 2019-09-16 ENCOUNTER — PATIENT OUTREACH (OUTPATIENT)
Dept: OTHER | Facility: OTHER | Age: 59
End: 2019-09-16

## 2019-10-03 ENCOUNTER — PATIENT OUTREACH (OUTPATIENT)
Dept: ADMINISTRATIVE | Facility: OTHER | Age: 59
End: 2019-10-03

## 2019-10-07 ENCOUNTER — OFFICE VISIT (OUTPATIENT)
Dept: OPTOMETRY | Facility: CLINIC | Age: 59
End: 2019-10-07
Payer: MEDICARE

## 2019-10-07 DIAGNOSIS — H25.13 NUCLEAR SCLEROSIS OF BOTH EYES: Primary | ICD-10-CM

## 2019-10-07 DIAGNOSIS — H35.033 HYPERTENSIVE RETINOPATHY OF BOTH EYES: ICD-10-CM

## 2019-10-07 DIAGNOSIS — H52.7 REFRACTIVE ERROR: ICD-10-CM

## 2019-10-07 PROCEDURE — 92014 COMPRE OPH EXAM EST PT 1/>: CPT | Mod: HCNC,S$GLB,, | Performed by: OPTOMETRIST

## 2019-10-07 PROCEDURE — 92014 PR EYE EXAM, EST PATIENT,COMPREHESV: ICD-10-PCS | Mod: HCNC,S$GLB,, | Performed by: OPTOMETRIST

## 2019-10-07 PROCEDURE — 92015 PR REFRACTION: ICD-10-PCS | Mod: HCNC,S$GLB,, | Performed by: OPTOMETRIST

## 2019-10-07 PROCEDURE — 99499 RISK ADDL DX/OHS AUDIT: ICD-10-PCS | Mod: S$GLB,,, | Performed by: OPTOMETRIST

## 2019-10-07 PROCEDURE — 99499 UNLISTED E&M SERVICE: CPT | Mod: S$GLB,,, | Performed by: OPTOMETRIST

## 2019-10-07 PROCEDURE — 99999 PR PBB SHADOW E&M-EST. PATIENT-LVL II: CPT | Mod: PBBFAC,HCNC,, | Performed by: OPTOMETRIST

## 2019-10-07 PROCEDURE — 92015 DETERMINE REFRACTIVE STATE: CPT | Mod: HCNC,S$GLB,, | Performed by: OPTOMETRIST

## 2019-10-07 PROCEDURE — 99999 PR PBB SHADOW E&M-EST. PATIENT-LVL II: ICD-10-PCS | Mod: PBBFAC,HCNC,, | Performed by: OPTOMETRIST

## 2019-10-07 NOTE — PROGRESS NOTES
Subjective:       Patient ID: Omid Rascon is a 59 y.o. female      Chief Complaint   Patient presents with    Concerns About Ocular Health    Hypertensive Eye Exam     History of Present Illness  Dls: 7/5/18 Dr. Sandoval    60 y/o female presents today for hypertensive eye exam.   Pt states no changes in vision. Pt wears pal's.     No tearing  No itching  No burning  No pain  + ha's  No floaters  No flashes    Eye meds  None       Assessment/Plan:     1. Nuclear sclerosis of both eyes  Educated pt on presence of cataracts and effects on vision. No surgery at this time. Recheck in one year.    2. Hypertensive retinopathy of both eyes  Educated patient about importance of good blood pressure control, compliance with meds, and follow up care with PCP. RTC 1 year for DFE.     3. Refractive error  Educated patient on refractive error and discussed lens options. Dispensed updated spectacle Rx. Educated about adaptation period to new specs.    Eyeglass Final Rx     Eyeglass Final Rx       Sphere Cylinder Axis Add    Right +1.50 +0.50 180 +2.25    Left +1.25 +0.50 165 +2.25    Expiration Date:  10/7/2020                  Follow up in about 1 year (around 10/7/2020).

## 2019-10-07 NOTE — PATIENT INSTRUCTIONS
Cataracts are an expected change to your eye when the natural lens inside of the eye becomes cloudy or opaque. You currently do have cataracts, but they do not require any surgery at this time. We will monitor their development with your annual eye exam and help you decide when the appropriate time for them to be removed.        What Are Cataracts?    A clear lens in the eye focuses light. This lets the eye see images sharply. With age, the lens slowly becomes cloudy. The cloudy lens is a cataract. A cataract scatters light and makes it hard for the eye to focus. Cataracts often form in both eyes. But one lens may cloud faster than the other.    The aging of your lens  Your lens may cloud so slowly that you don`t notice any vision changes at first. But as the cataract gets worse, the eye has a harder time focusing. In early stages, glasses may help you see better. As the lens gets more cloudy, your doctor may recommend surgery to restore your vision.    A clear lens allows your eye to bring objects sharply into focus.  A mild cataract may slightly blur your vision.  A dense cataract can severely blur your vision.    Date Last Reviewed: 6/14/2015  © 9621-8756 [email or web address]. [address] [place] [address], [place] [address]. All rights reserved. This information is not intended as a substitute for professional medical care. Always follow your healthcare professional's instructions.

## 2019-10-15 ENCOUNTER — LAB VISIT (OUTPATIENT)
Dept: LAB | Facility: HOSPITAL | Age: 59
End: 2019-10-15
Attending: INTERNAL MEDICINE
Payer: MEDICARE

## 2019-10-15 ENCOUNTER — OFFICE VISIT (OUTPATIENT)
Dept: HEMATOLOGY/ONCOLOGY | Facility: CLINIC | Age: 59
End: 2019-10-15
Payer: MEDICARE

## 2019-10-15 VITALS
HEART RATE: 77 BPM | SYSTOLIC BLOOD PRESSURE: 199 MMHG | HEIGHT: 65 IN | TEMPERATURE: 98 F | DIASTOLIC BLOOD PRESSURE: 124 MMHG | WEIGHT: 182.75 LBS | BODY MASS INDEX: 30.45 KG/M2 | OXYGEN SATURATION: 99 %

## 2019-10-15 DIAGNOSIS — D47.2 MGUS (MONOCLONAL GAMMOPATHY OF UNKNOWN SIGNIFICANCE): ICD-10-CM

## 2019-10-15 DIAGNOSIS — N18.2 CKD (CHRONIC KIDNEY DISEASE), STAGE II: Chronic | ICD-10-CM

## 2019-10-15 DIAGNOSIS — D64.9 ANEMIA, UNSPECIFIED TYPE: ICD-10-CM

## 2019-10-15 DIAGNOSIS — D58.2 HEMOGLOBIN C TRAIT: Chronic | ICD-10-CM

## 2019-10-15 DIAGNOSIS — D47.2 MONOCLONAL GAMMOPATHY: Primary | ICD-10-CM

## 2019-10-15 LAB
ALBUMIN SERPL BCP-MCNC: 4.1 G/DL (ref 3.5–5.2)
ALP SERPL-CCNC: 64 U/L (ref 55–135)
ALT SERPL W/O P-5'-P-CCNC: 22 U/L (ref 10–44)
ANION GAP SERPL CALC-SCNC: 7 MMOL/L (ref 8–16)
AST SERPL-CCNC: 22 U/L (ref 10–40)
BASOPHILS # BLD AUTO: 0.06 K/UL (ref 0–0.2)
BASOPHILS NFR BLD: 1.4 % (ref 0–1.9)
BILIRUB SERPL-MCNC: 0.5 MG/DL (ref 0.1–1)
BUN SERPL-MCNC: 10 MG/DL (ref 6–20)
CALCIUM SERPL-MCNC: 9.4 MG/DL (ref 8.7–10.5)
CHLORIDE SERPL-SCNC: 104 MMOL/L (ref 95–110)
CO2 SERPL-SCNC: 28 MMOL/L (ref 23–29)
CREAT SERPL-MCNC: 1 MG/DL (ref 0.5–1.4)
DIFFERENTIAL METHOD: ABNORMAL
EOSINOPHIL # BLD AUTO: 0.1 K/UL (ref 0–0.5)
EOSINOPHIL NFR BLD: 2.8 % (ref 0–8)
ERYTHROCYTE [DISTWIDTH] IN BLOOD BY AUTOMATED COUNT: 14.4 % (ref 11.5–14.5)
EST. GFR  (AFRICAN AMERICAN): >60 ML/MIN/1.73 M^2
EST. GFR  (NON AFRICAN AMERICAN): >60 ML/MIN/1.73 M^2
FERRITIN SERPL-MCNC: 58 NG/ML (ref 20–300)
GLUCOSE SERPL-MCNC: 97 MG/DL (ref 70–110)
HCT VFR BLD AUTO: 37.1 % (ref 37–48.5)
HGB BLD-MCNC: 12.1 G/DL (ref 12–16)
IMM GRANULOCYTES # BLD AUTO: 0.01 K/UL (ref 0–0.04)
IMM GRANULOCYTES NFR BLD AUTO: 0.2 % (ref 0–0.5)
LYMPHOCYTES # BLD AUTO: 1.5 K/UL (ref 1–4.8)
LYMPHOCYTES NFR BLD: 35.5 % (ref 18–48)
MCH RBC QN AUTO: 26.2 PG (ref 27–31)
MCHC RBC AUTO-ENTMCNC: 32.6 G/DL (ref 32–36)
MCV RBC AUTO: 80 FL (ref 82–98)
MONOCYTES # BLD AUTO: 0.5 K/UL (ref 0.3–1)
MONOCYTES NFR BLD: 11.3 % (ref 4–15)
NEUTROPHILS # BLD AUTO: 2.1 K/UL (ref 1.8–7.7)
NEUTROPHILS NFR BLD: 48.8 % (ref 38–73)
NRBC BLD-RTO: 0 /100 WBC
PLATELET # BLD AUTO: 196 K/UL (ref 150–350)
PMV BLD AUTO: 10.6 FL (ref 9.2–12.9)
POTASSIUM SERPL-SCNC: 3.5 MMOL/L (ref 3.5–5.1)
PROT SERPL-MCNC: 8.2 G/DL (ref 6–8.4)
RBC # BLD AUTO: 4.62 M/UL (ref 4–5.4)
SODIUM SERPL-SCNC: 139 MMOL/L (ref 136–145)
WBC # BLD AUTO: 4.23 K/UL (ref 3.9–12.7)

## 2019-10-15 PROCEDURE — 3008F PR BODY MASS INDEX (BMI) DOCUMENTED: ICD-10-PCS | Mod: HCNC,CPTII,S$GLB, | Performed by: INTERNAL MEDICINE

## 2019-10-15 PROCEDURE — 83520 IMMUNOASSAY QUANT NOS NONAB: CPT | Mod: HCNC

## 2019-10-15 PROCEDURE — 82728 ASSAY OF FERRITIN: CPT | Mod: HCNC

## 2019-10-15 PROCEDURE — 3077F PR MOST RECENT SYSTOLIC BLOOD PRESSURE >= 140 MM HG: ICD-10-PCS | Mod: HCNC,CPTII,S$GLB, | Performed by: INTERNAL MEDICINE

## 2019-10-15 PROCEDURE — 80053 COMPREHEN METABOLIC PANEL: CPT | Mod: HCNC

## 2019-10-15 PROCEDURE — 85025 COMPLETE CBC W/AUTO DIFF WBC: CPT | Mod: HCNC

## 2019-10-15 PROCEDURE — 3080F DIAST BP >= 90 MM HG: CPT | Mod: HCNC,CPTII,S$GLB, | Performed by: INTERNAL MEDICINE

## 2019-10-15 PROCEDURE — 3077F SYST BP >= 140 MM HG: CPT | Mod: HCNC,CPTII,S$GLB, | Performed by: INTERNAL MEDICINE

## 2019-10-15 PROCEDURE — 99215 OFFICE O/P EST HI 40 MIN: CPT | Mod: HCNC,GC,S$GLB, | Performed by: INTERNAL MEDICINE

## 2019-10-15 PROCEDURE — 99999 PR PBB SHADOW E&M-EST. PATIENT-LVL III: CPT | Mod: PBBFAC,HCNC,, | Performed by: INTERNAL MEDICINE

## 2019-10-15 PROCEDURE — 3080F PR MOST RECENT DIASTOLIC BLOOD PRESSURE >= 90 MM HG: ICD-10-PCS | Mod: HCNC,CPTII,S$GLB, | Performed by: INTERNAL MEDICINE

## 2019-10-15 PROCEDURE — 3008F BODY MASS INDEX DOCD: CPT | Mod: HCNC,CPTII,S$GLB, | Performed by: INTERNAL MEDICINE

## 2019-10-15 PROCEDURE — 36415 COLL VENOUS BLD VENIPUNCTURE: CPT | Mod: HCNC

## 2019-10-15 PROCEDURE — 99999 PR PBB SHADOW E&M-EST. PATIENT-LVL III: ICD-10-PCS | Mod: PBBFAC,HCNC,, | Performed by: INTERNAL MEDICINE

## 2019-10-15 PROCEDURE — 99215 PR OFFICE/OUTPT VISIT, EST, LEVL V, 40-54 MIN: ICD-10-PCS | Mod: HCNC,GC,S$GLB, | Performed by: INTERNAL MEDICINE

## 2019-10-15 NOTE — PROGRESS NOTES
Subjective:       Patient ID: Omid Rascon is a 59 y.o. female.    Chief Complaint: Monoclonal gammopathy      Today's Visit  Return visit to follow-up mild anemia and minimal elevation in kappa free light chain    HPI  Mrs. Rascon is a 56 year old black female from the [place] with history for hypertension, hgb C trait, hearing impairment, CKD2 and history of back injury who returns for follow up regarding her history of a right frontal head ba first detected January 2016 and later associated with an MGUS that was detected 11/10/16. MGUS was no longer detected in 2/27/17.  Her right frontal calvarial mass resolved spontaneously by April 2017. In August 2017, patient had a mild elevation in serum kappa light chain 3.42 mg/dL with ratio 1.97. Repeat 12/29/17 showed decrease in kappa light chain to 2.95 with ratio of 1.77.  Has had chronically low MCV and MCH 77 and 26.1 respectively. She had an MRI of spine 1/8/18 which showed focal area of marrow signal abnormality posterior to the T4 vertebral body, possibly a normal variant and a focal area of marrow signal abnormality posterior to the L4 vertebral body which is favored to represent a slightly atypical osseous hemangioma.    Interval History  No issues to report. Patient feeling well.     Remote History:  Mrs. Rascon was evaluated by Dr. Keith of neurosurgery in January 2016 for a right scalp mass. She had imaging studies 1/28/16 that included head CT and brain MRI. Right frontal T1 hypointense calvarial lesion with prominence of the overlying soft tissues. Additionally, there is a mildly enhancing dural lesion along the anterior falx without evidence for intra-axial enhancement or parenchymal lesion. Lucencies in the right frontal calvarium and hyperattenuating lesion along the anterior cerebral falx correspond to lesions seen on MRI.   She was referred to Dr. Ken for oncologic work up. CT of neck chest abdomen and pelvis were negative for malignancy  in February 2016. Patient had repeat brain and and calvarium imaging in March of 2016 prior to anticipated biopsy. Lesions appeared unchanged.   Surgery for biopsy was postponed due to patient's uncontrolled blood pressure.   She has been seen by nephrology, Dr. Rosa to address her refractory hypertension as well as her early stage CKD. Serum free light chain, SPEP and KEVIN drawn 11/10/16. FLC showed mildly elevated kappa = 2.29 with babak ratio. SPEP was normal but KEVIN detected an IgG lambda specific monoclonal protein at the beta/gamma junction. This had been seen on KEVIN 5/16/16 as well.   She reports longstanding history of anemia she attributes to Hgb C trait. She has received two blood transfusions in the distant past. She has a history of heavy menstrual bleeding which required a uterine lining ablation.    Review of Systems   Constitutional: Negative for appetite change and unexpected weight change.   Eyes: Negative for visual disturbance.   Respiratory: Negative for cough and shortness of breath.    Cardiovascular: Negative for chest pain.   Gastrointestinal: Negative for abdominal pain and diarrhea.   Genitourinary: Negative for frequency.   Musculoskeletal: Negative for back pain.   Skin: Negative for rash.   Neurological: Negative for headaches.   Hematological: Negative for adenopathy.   Psychiatric/Behavioral: The patient is not nervous/anxious.        Objective:      Physical Exam   Constitutional: She is oriented to person, place, and time. She appears well-developed and well-nourished.   HENT:   Mouth/Throat: Oropharynx is clear and moist.   Eyes: Pupils are equal, round, and reactive to light. Conjunctivae are normal.   Cardiovascular: Normal rate and regular rhythm.   Pulmonary/Chest: Effort normal and breath sounds normal.   Abdominal: Soft. Bowel sounds are normal.   Lymphadenopathy:     She has no cervical adenopathy.   Neurological: She is alert and oriented to person, place, and time.    Skin: Skin is warm and dry.   Psychiatric: She has a normal mood and affect. Her behavior is normal.       Assessment:       1. Monoclonal gammopathy    2. Hemoglobin C trait    3. Anemia, unspecified type    4. 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 Chand.     Fanny Solares MD PGY-V  Hematology/Oncology Fellow

## 2019-10-16 LAB
KAPPA LC SER QL IA: 2.48 MG/DL (ref 0.33–1.94)
KAPPA LC/LAMBDA SER IA: 1.89 (ref 0.26–1.65)
LAMBDA LC SER QL IA: 1.31 MG/DL (ref 0.57–2.63)

## 2019-10-18 ENCOUNTER — PATIENT OUTREACH (OUTPATIENT)
Dept: OTHER | Facility: OTHER | Age: 59
End: 2019-10-18

## 2019-10-18 NOTE — PROGRESS NOTES
Digital Medicine: Health  Follow-Up    The history is provided by the patient.     Follow Up  Follow-up reason(s): routine education    Patient stated that she is doing well. She is unsure why her BP continues to run high in the doctors office (she thinks it may be due to stress and feeling anxious due to the nature of the appointments). She denies symptoms. We reviewed proper technique when using our BP cuff to ensure those readings are accurate. She also stated that she keeps up with charging her BP cuff.           Diet:   She has the following dietary restrictions: low sodium diet    Patient continues focusing on maintaining a low sodium diet. She is not adding salt or salted seasonings to meals and she is very mindful of the things she chooses at the grocery store.       [facility]    Intervention/Plan    There are no preventive care reminders to display for this patient.    Last 5 Patient Entered Readings                                      Current 30 Day Average: 115/75     Recent Readings 10/14/2019 10/14/2019 10/8/2019 10/8/2019 10/4/2019    SBP (mmHg) 105 105 118 118 124    DBP (mmHg) 70 70 78 78 75    Pulse 71 - - 82 74

## 2019-11-12 ENCOUNTER — PATIENT MESSAGE (OUTPATIENT)
Dept: ADMINISTRATIVE | Facility: OTHER | Age: 59
End: 2019-11-12

## 2019-11-14 ENCOUNTER — PATIENT MESSAGE (OUTPATIENT)
Dept: OTHER | Facility: OTHER | Age: 59
End: 2019-11-14

## 2019-12-12 ENCOUNTER — PATIENT OUTREACH (OUTPATIENT)
Dept: OTHER | Facility: OTHER | Age: 59
End: 2019-12-12

## 2019-12-18 ENCOUNTER — PATIENT MESSAGE (OUTPATIENT)
Dept: ADMINISTRATIVE | Facility: OTHER | Age: 59
End: 2019-12-18

## 2020-01-23 NOTE — PROGRESS NOTES
Digital Medicine: Health  Follow-Up    Checked in with patient to introduce myself to her as her new health .    She reports that she believes that she may have white coat syndrome as her blood pressure is consistently much higher for her office visits than they are as she self monitors.     She explained that an individual reading of 130/80 is typically a higher reading for her, although she acknowledges that her average is under the program goal of 130/80.     She explained that she is taking more readings weekly as she and I are just starting to work together, as she would like me to better understand what her blood pressure is like.     She requests that I call her monthly to check in.     She reports that she injured her back again at the end of the summer 2019.               INTERVENTION(S)  encouragement/support      There are no preventive care reminders to display for this patient.    Last 5 Patient Entered Readings                                      Current 30 Day Average: 121/76     Recent Readings 1/22/2020 1/22/2020 1/20/2020 1/20/2020 1/14/2020    SBP (mmHg) 124 124 118 118 105    DBP (mmHg) 75 75 78 78 70    Pulse 74 - 82 - 71                  Screenings    SDOH

## 2020-02-20 ENCOUNTER — PATIENT OUTREACH (OUTPATIENT)
Dept: OTHER | Facility: OTHER | Age: 60
End: 2020-02-20

## 2020-02-20 NOTE — PROGRESS NOTES
"Digital Medicine: Health  Follow-Up    Patient gets 8 hours of sleep per night. Reports going to bed around 7:30 pm and waking up at 5:00 am.     She expressed possible concern of her lower reading of 105/70 although she did not experience symptoms of low blood pressure. She believes it may be lower lately because of her increased focus on salt restriction and exercise.           INTERVENTION(S)  encouragement/support      There are no preventive care reminders to display for this patient.    Last 5 Patient Entered Readings                                      Current 30 Day Average: 113/73     Recent Readings 2/17/2020 2/17/2020 2/11/2020 2/11/2020 2/5/2020    SBP (mmHg) 105 105 124 124 105    DBP (mmHg) 70 70 75 75 70    Pulse 71 - - 74 -                      Diet Screening       Patient reports that she has been eating out less and restricting her sodium more. She reports taking an iron and vitamin c supplement regularly.     Physical Activity Screening       She reports that she started aquasize classes and she plans to walk her three dogs more in the morning when the weather changes. She explained that she has a flower and a vegetable garden and it helps her to deal with stress. She especially likes to pull weeds for that reason.     Medication Adherence Screening       Patient reports that she is unable to take blood pressure medication. She has a diagnosis of "white coat syndrome with diagnosis of hypertension". She manages her blood pressure with life style.       SDOH  "

## 2020-03-20 ENCOUNTER — PATIENT OUTREACH (OUTPATIENT)
Dept: OTHER | Facility: OTHER | Age: 60
End: 2020-03-20

## 2020-03-20 NOTE — PROGRESS NOTES
Digital Medicine: Health  Follow-Up    Patient discussed that she would like to take her temperature so that she could self monitor while practicing social distancing. We discussed options for her to purchase a thermometer, and I also shared more information with her about the [facility] program. I'll provide her with more information regarding how to participate and cost associated with the [employer] equipment by Tuesday 3/24 the latest. She requested I call her, and if she isn't able to answer, for me to [employer] message her the information.           Intervention/Plan    There are no preventive care reminders to display for this patient.    Last 5 Patient Entered Readings                                      Current 30 Day Average: 116/74     Recent Readings 3/20/2020 3/20/2020 3/13/2020 3/13/2020 3/10/2020    SBP (mmHg) 118 118 124 124 105    DBP (mmHg) 78 78 75 75 70    Pulse - 82 74 - 71                  Screenings    SDOH

## 2020-04-27 ENCOUNTER — PATIENT OUTREACH (OUTPATIENT)
Dept: ADMINISTRATIVE | Facility: HOSPITAL | Age: 60
End: 2020-04-27

## 2020-04-27 NOTE — PROGRESS NOTES
Overdue Mammogram - done within [facility]. No results found in [facility].    Colonoscopy - no record found within the chart or provider name.

## 2020-05-09 ENCOUNTER — PATIENT MESSAGE (OUTPATIENT)
Dept: ADMINISTRATIVE | Facility: OTHER | Age: 60
End: 2020-05-09

## 2020-05-20 ENCOUNTER — TELEPHONE (OUTPATIENT)
Dept: FAMILY MEDICINE | Facility: CLINIC | Age: 60
End: 2020-05-20

## 2020-05-20 DIAGNOSIS — L81.9 DISCOLORATION OF SKIN OF MULTIPLE SITES: Primary | ICD-10-CM

## 2020-05-20 NOTE — TELEPHONE ENCOUNTER
----- Message from Rachelle Gore sent at 5/20/2020  2:07 PM CDT -----  Type:  Patient Requesting Referral    Who Called: Self     Referral to What Specialty: Dermatology     Reason for Referral: dark spots on arms and thighs    Does the patient want the referral with a specific physician?: no     [facility] the [facility]     Would the patient rather a call back or a response via [facility]?  Call     Best Call Back Number: [phone]

## 2020-06-01 ENCOUNTER — PATIENT OUTREACH (OUTPATIENT)
Dept: ADMINISTRATIVE | Facility: OTHER | Age: 60
End: 2020-06-01

## 2020-06-09 ENCOUNTER — PATIENT OUTREACH (OUTPATIENT)
Dept: OTHER | Facility: OTHER | Age: 60
End: 2020-06-09

## 2020-06-09 PROCEDURE — 99457 RPM TX MGMT 1ST 20 MIN: CPT | Mod: S$GLB,,, | Performed by: INTERNAL MEDICINE

## 2020-06-09 PROCEDURE — 99457 PR MONITORING, PHYSIOL PARAM, REMOTE, 1ST 20 MINS, PER MONTH: ICD-10-PCS | Mod: S$GLB,,, | Performed by: INTERNAL MEDICINE

## 2020-06-09 NOTE — PROGRESS NOTES
Digital Medicine: Health  Follow-Up    Patient reports that her sons have been having medical problems, which she believes may have caused some higher readings for her, though she explained that they are doing better.    She has been using the same cuff since 2016. I advised her to order a new cuff for home delivery, which she agreed to. I disclosed that there is a charge of about $30. I will submit a crm.           INTERVENTION(S)  encouragement/support    PLAN  patient verbalizes understanding and patient amenable to changes      There are no preventive care reminders to display for this patient.    Last 5 Patient Entered Readings                                      Current 30 Day Average: 110/71     Recent Readings 6/3/2020 6/3/2020 5/27/2020 5/27/2020 5/21/2020    SBP (mmHg) 124 124 105 105 105    DBP (mmHg) 75 75 70 70 70    Pulse - 74 71 - 71                  Screenings    SDOH

## 2020-06-25 ENCOUNTER — PATIENT OUTREACH (OUTPATIENT)
Dept: ADMINISTRATIVE | Facility: OTHER | Age: 60
End: 2020-06-25

## 2020-06-25 ENCOUNTER — TELEPHONE (OUTPATIENT)
Dept: NEPHROLOGY | Facility: CLINIC | Age: 60
End: 2020-06-25

## 2020-06-25 DIAGNOSIS — H69.91 EUSTACHIAN TUBE DYSFUNCTION, RIGHT: ICD-10-CM

## 2020-06-25 DIAGNOSIS — M54.50 CHRONIC BILATERAL LOW BACK PAIN WITHOUT SCIATICA: ICD-10-CM

## 2020-06-25 DIAGNOSIS — G89.29 CHRONIC BILATERAL LOW BACK PAIN WITHOUT SCIATICA: ICD-10-CM

## 2020-06-25 NOTE — PROGRESS NOTES
Requested updates within Care Everywhere.  Patient's chart was reviewed for overdue EUGENE topics.  Immunizations reconciled.    Orders placed:  Tasked appts:Mammogram  Labs Linked:

## 2020-06-26 RX ORDER — CETIRIZINE HYDROCHLORIDE 10 MG/1
10 TABLET ORAL DAILY PRN
Qty: 90 TABLET | Refills: 3 | Status: SHIPPED | OUTPATIENT
Start: 2020-06-26 | End: 2021-02-17 | Stop reason: SDUPTHER

## 2020-06-26 RX ORDER — MELOXICAM 15 MG/1
15 TABLET ORAL DAILY PRN
Qty: 90 TABLET | Refills: 0 | Status: SHIPPED | OUTPATIENT
Start: 2020-06-26 | End: 2022-04-28 | Stop reason: SDUPTHER

## 2020-07-02 ENCOUNTER — HOSPITAL ENCOUNTER (OUTPATIENT)
Dept: RADIOLOGY | Facility: HOSPITAL | Age: 60
Discharge: HOME | End: 2020-07-02
Attending: OBSTETRICS & GYNECOLOGY
Payer: MEDICARE

## 2020-07-02 DIAGNOSIS — Z12.39 SCREENING FOR BREAST CANCER: ICD-10-CM

## 2020-07-02 PROCEDURE — 77067 MAMMO DIGITAL SCREENING BILAT WITH TOMOSYNTHESIS_CAD: ICD-10-PCS | Mod: 26,HCNC,, | Performed by: RADIOLOGY

## 2020-07-02 PROCEDURE — 77067 SCR MAMMO BI INCL CAD: CPT | Mod: TC,HCNC,PO

## 2020-07-02 PROCEDURE — 77063 MAMMO DIGITAL SCREENING BILAT WITH TOMOSYNTHESIS_CAD: ICD-10-PCS | Mod: 26,HCNC,, | Performed by: RADIOLOGY

## 2020-07-02 PROCEDURE — 77063 BREAST TOMOSYNTHESIS BI: CPT | Mod: 26,HCNC,, | Performed by: RADIOLOGY

## 2020-07-02 PROCEDURE — 77067 SCR MAMMO BI INCL CAD: CPT | Mod: 26,HCNC,, | Performed by: RADIOLOGY

## 2020-07-14 DIAGNOSIS — N18.2 STAGE 2 CHRONIC KIDNEY DISEASE: Primary | ICD-10-CM

## 2020-07-22 ENCOUNTER — LAB VISIT (OUTPATIENT)
Dept: LAB | Facility: HOSPITAL | Age: 60
End: 2020-07-22
Attending: INTERNAL MEDICINE
Payer: MEDICARE

## 2020-07-22 DIAGNOSIS — N18.2 STAGE 2 CHRONIC KIDNEY DISEASE: ICD-10-CM

## 2020-07-22 LAB
ALBUMIN SERPL BCP-MCNC: 4 G/DL (ref 3.5–5.2)
ANION GAP SERPL CALC-SCNC: 6 MMOL/L (ref 8–16)
BASOPHILS # BLD AUTO: 0.05 K/UL (ref 0–0.2)
BASOPHILS NFR BLD: 1.3 % (ref 0–1.9)
BUN SERPL-MCNC: 17 MG/DL (ref 8–23)
CALCIUM SERPL-MCNC: 9.2 MG/DL (ref 8.7–10.5)
CHLORIDE SERPL-SCNC: 104 MMOL/L (ref 95–110)
CO2 SERPL-SCNC: 27 MMOL/L (ref 23–29)
CREAT SERPL-MCNC: 1.1 MG/DL (ref 0.5–1.4)
DIFFERENTIAL METHOD: ABNORMAL
EOSINOPHIL # BLD AUTO: 0.1 K/UL (ref 0–0.5)
EOSINOPHIL NFR BLD: 2.5 % (ref 0–8)
ERYTHROCYTE [DISTWIDTH] IN BLOOD BY AUTOMATED COUNT: 14.4 % (ref 11.5–14.5)
EST. GFR  (AFRICAN AMERICAN): >60 ML/MIN/1.73 M^2
EST. GFR  (NON AFRICAN AMERICAN): 54.3 ML/MIN/1.73 M^2
GLUCOSE SERPL-MCNC: 87 MG/DL (ref 70–110)
HCT VFR BLD AUTO: 36.6 % (ref 37–48.5)
HGB BLD-MCNC: 12 G/DL (ref 12–16)
IMM GRANULOCYTES # BLD AUTO: 0.01 K/UL (ref 0–0.04)
IMM GRANULOCYTES NFR BLD AUTO: 0.3 % (ref 0–0.5)
LYMPHOCYTES # BLD AUTO: 1.5 K/UL (ref 1–4.8)
LYMPHOCYTES NFR BLD: 37.2 % (ref 18–48)
MCH RBC QN AUTO: 26.5 PG (ref 27–31)
MCHC RBC AUTO-ENTMCNC: 32.8 G/DL (ref 32–36)
MCV RBC AUTO: 81 FL (ref 82–98)
MONOCYTES # BLD AUTO: 0.4 K/UL (ref 0.3–1)
MONOCYTES NFR BLD: 9.5 % (ref 4–15)
NEUTROPHILS # BLD AUTO: 2 K/UL (ref 1.8–7.7)
NEUTROPHILS NFR BLD: 49.2 % (ref 38–73)
NRBC BLD-RTO: 0 /100 WBC
PHOSPHATE SERPL-MCNC: 3.2 MG/DL (ref 2.7–4.5)
PLATELET # BLD AUTO: 192 K/UL (ref 150–350)
PMV BLD AUTO: 11.3 FL (ref 9.2–12.9)
POTASSIUM SERPL-SCNC: 4 MMOL/L (ref 3.5–5.1)
PTH-INTACT SERPL-MCNC: 169 PG/ML (ref 9–77)
RBC # BLD AUTO: 4.52 M/UL (ref 4–5.4)
SODIUM SERPL-SCNC: 137 MMOL/L (ref 136–145)
WBC # BLD AUTO: 3.98 K/UL (ref 3.9–12.7)

## 2020-07-22 PROCEDURE — 36415 COLL VENOUS BLD VENIPUNCTURE: CPT | Mod: HCNC,PO

## 2020-07-22 PROCEDURE — 80069 RENAL FUNCTION PANEL: CPT | Mod: HCNC

## 2020-07-22 PROCEDURE — 83970 ASSAY OF PARATHORMONE: CPT | Mod: HCNC

## 2020-07-22 PROCEDURE — 85025 COMPLETE CBC W/AUTO DIFF WBC: CPT | Mod: HCNC

## 2020-07-27 ENCOUNTER — OFFICE VISIT (OUTPATIENT)
Dept: NEPHROLOGY | Facility: CLINIC | Age: 60
End: 2020-07-27
Payer: MEDICARE

## 2020-07-27 ENCOUNTER — LAB VISIT (OUTPATIENT)
Dept: LAB | Facility: HOSPITAL | Age: 60
End: 2020-07-27
Payer: MEDICARE

## 2020-07-27 ENCOUNTER — OFFICE VISIT (OUTPATIENT)
Dept: DERMATOLOGY | Facility: CLINIC | Age: 60
End: 2020-07-27
Payer: MEDICARE

## 2020-07-27 ENCOUNTER — PATIENT OUTREACH (OUTPATIENT)
Dept: ADMINISTRATIVE | Facility: OTHER | Age: 60
End: 2020-07-27

## 2020-07-27 VITALS
BODY MASS INDEX: 29.35 KG/M2 | DIASTOLIC BLOOD PRESSURE: 118 MMHG | WEIGHT: 176.38 LBS | HEART RATE: 88 BPM | SYSTOLIC BLOOD PRESSURE: 180 MMHG | OXYGEN SATURATION: 99 %

## 2020-07-27 DIAGNOSIS — L28.0 FRICTIONAL LICHENOID DERMATOSIS: ICD-10-CM

## 2020-07-27 DIAGNOSIS — I10 WHITE COAT SYNDROME WITH DIAGNOSIS OF HYPERTENSION: Primary | Chronic | ICD-10-CM

## 2020-07-27 DIAGNOSIS — N28.1 RENAL CYST: ICD-10-CM

## 2020-07-27 DIAGNOSIS — N18.2 CKD (CHRONIC KIDNEY DISEASE), STAGE II: Chronic | ICD-10-CM

## 2020-07-27 DIAGNOSIS — E55.9 VITAMIN D DEFICIENCY: ICD-10-CM

## 2020-07-27 LAB
25(OH)D3+25(OH)D2 SERPL-MCNC: 17 NG/ML (ref 30–96)
ALBUMIN SERPL BCP-MCNC: 4.1 G/DL (ref 3.5–5.2)
ANION GAP SERPL CALC-SCNC: 8 MMOL/L (ref 8–16)
BASOPHILS # BLD AUTO: 0.04 K/UL (ref 0–0.2)
BASOPHILS NFR BLD: 1 % (ref 0–1.9)
BUN SERPL-MCNC: 14 MG/DL (ref 8–23)
CALCIUM SERPL-MCNC: 9.2 MG/DL (ref 8.7–10.5)
CHLORIDE SERPL-SCNC: 105 MMOL/L (ref 95–110)
CO2 SERPL-SCNC: 26 MMOL/L (ref 23–29)
CREAT SERPL-MCNC: 1.1 MG/DL (ref 0.5–1.4)
DIFFERENTIAL METHOD: ABNORMAL
EOSINOPHIL # BLD AUTO: 0.1 K/UL (ref 0–0.5)
EOSINOPHIL NFR BLD: 1.5 % (ref 0–8)
ERYTHROCYTE [DISTWIDTH] IN BLOOD BY AUTOMATED COUNT: 13.9 % (ref 11.5–14.5)
EST. GFR  (AFRICAN AMERICAN): >60 ML/MIN/1.73 M^2
EST. GFR  (NON AFRICAN AMERICAN): 54.3 ML/MIN/1.73 M^2
GLUCOSE SERPL-MCNC: 97 MG/DL (ref 70–110)
HCT VFR BLD AUTO: 36.4 % (ref 37–48.5)
HGB BLD-MCNC: 11.9 G/DL (ref 12–16)
IMM GRANULOCYTES # BLD AUTO: 0 K/UL (ref 0–0.04)
IMM GRANULOCYTES NFR BLD AUTO: 0 % (ref 0–0.5)
LYMPHOCYTES # BLD AUTO: 1.6 K/UL (ref 1–4.8)
LYMPHOCYTES NFR BLD: 40.5 % (ref 18–48)
MCH RBC QN AUTO: 26.4 PG (ref 27–31)
MCHC RBC AUTO-ENTMCNC: 32.7 G/DL (ref 32–36)
MCV RBC AUTO: 81 FL (ref 82–98)
MONOCYTES # BLD AUTO: 0.3 K/UL (ref 0.3–1)
MONOCYTES NFR BLD: 8 % (ref 4–15)
NEUTROPHILS # BLD AUTO: 2 K/UL (ref 1.8–7.7)
NEUTROPHILS NFR BLD: 49 % (ref 38–73)
NRBC BLD-RTO: 0 /100 WBC
PHOSPHATE SERPL-MCNC: 3.3 MG/DL (ref 2.7–4.5)
PLATELET # BLD AUTO: 179 K/UL (ref 150–350)
PMV BLD AUTO: 9.9 FL (ref 9.2–12.9)
POTASSIUM SERPL-SCNC: 3.6 MMOL/L (ref 3.5–5.1)
PTH-INTACT SERPL-MCNC: 153 PG/ML (ref 9–77)
RBC # BLD AUTO: 4.5 M/UL (ref 4–5.4)
SODIUM SERPL-SCNC: 139 MMOL/L (ref 136–145)
WBC # BLD AUTO: 3.98 K/UL (ref 3.9–12.7)

## 2020-07-27 PROCEDURE — 99202 OFFICE O/P NEW SF 15 MIN: CPT | Mod: HCNC,S$GLB,, | Performed by: PHYSICIAN ASSISTANT

## 2020-07-27 PROCEDURE — 99999 PR PBB SHADOW E&M-EST. PATIENT-LVL IV: ICD-10-PCS | Mod: PBBFAC,HCNC,, | Performed by: INTERNAL MEDICINE

## 2020-07-27 PROCEDURE — 36415 COLL VENOUS BLD VENIPUNCTURE: CPT | Mod: HCNC

## 2020-07-27 PROCEDURE — 99999 PR PBB SHADOW E&M-EST. PATIENT-LVL IV: CPT | Mod: PBBFAC,HCNC,, | Performed by: INTERNAL MEDICINE

## 2020-07-27 PROCEDURE — 99202 PR OFFICE/OUTPT VISIT, NEW, LEVL II, 15-29 MIN: ICD-10-PCS | Mod: HCNC,S$GLB,, | Performed by: PHYSICIAN ASSISTANT

## 2020-07-27 PROCEDURE — 3077F PR MOST RECENT SYSTOLIC BLOOD PRESSURE >= 140 MM HG: ICD-10-PCS | Mod: HCNC,CPTII,S$GLB, | Performed by: INTERNAL MEDICINE

## 2020-07-27 PROCEDURE — 99999 PR PBB SHADOW E&M-EST. PATIENT-LVL III: CPT | Mod: PBBFAC,HCNC,, | Performed by: PHYSICIAN ASSISTANT

## 2020-07-27 PROCEDURE — 3008F PR BODY MASS INDEX (BMI) DOCUMENTED: ICD-10-PCS | Mod: HCNC,CPTII,S$GLB, | Performed by: INTERNAL MEDICINE

## 2020-07-27 PROCEDURE — 80069 RENAL FUNCTION PANEL: CPT | Mod: HCNC

## 2020-07-27 PROCEDURE — 3077F SYST BP >= 140 MM HG: CPT | Mod: HCNC,CPTII,S$GLB, | Performed by: PHYSICIAN ASSISTANT

## 2020-07-27 PROCEDURE — 99213 PR OFFICE/OUTPT VISIT, EST, LEVL III, 20-29 MIN: ICD-10-PCS | Mod: HCNC,S$GLB,, | Performed by: INTERNAL MEDICINE

## 2020-07-27 PROCEDURE — 3080F DIAST BP >= 90 MM HG: CPT | Mod: HCNC,CPTII,S$GLB, | Performed by: PHYSICIAN ASSISTANT

## 2020-07-27 PROCEDURE — 83970 ASSAY OF PARATHORMONE: CPT | Mod: HCNC

## 2020-07-27 PROCEDURE — 3080F PR MOST RECENT DIASTOLIC BLOOD PRESSURE >= 90 MM HG: ICD-10-PCS | Mod: HCNC,CPTII,S$GLB, | Performed by: PHYSICIAN ASSISTANT

## 2020-07-27 PROCEDURE — 3080F PR MOST RECENT DIASTOLIC BLOOD PRESSURE >= 90 MM HG: ICD-10-PCS | Mod: HCNC,CPTII,S$GLB, | Performed by: INTERNAL MEDICINE

## 2020-07-27 PROCEDURE — 85025 COMPLETE CBC W/AUTO DIFF WBC: CPT | Mod: HCNC

## 2020-07-27 PROCEDURE — 3080F DIAST BP >= 90 MM HG: CPT | Mod: HCNC,CPTII,S$GLB, | Performed by: INTERNAL MEDICINE

## 2020-07-27 PROCEDURE — 82306 VITAMIN D 25 HYDROXY: CPT | Mod: HCNC

## 2020-07-27 PROCEDURE — 3077F SYST BP >= 140 MM HG: CPT | Mod: HCNC,CPTII,S$GLB, | Performed by: INTERNAL MEDICINE

## 2020-07-27 PROCEDURE — 99213 OFFICE O/P EST LOW 20 MIN: CPT | Mod: HCNC,S$GLB,, | Performed by: INTERNAL MEDICINE

## 2020-07-27 PROCEDURE — 3008F BODY MASS INDEX DOCD: CPT | Mod: HCNC,CPTII,S$GLB, | Performed by: INTERNAL MEDICINE

## 2020-07-27 PROCEDURE — 3077F PR MOST RECENT SYSTOLIC BLOOD PRESSURE >= 140 MM HG: ICD-10-PCS | Mod: HCNC,CPTII,S$GLB, | Performed by: PHYSICIAN ASSISTANT

## 2020-07-27 PROCEDURE — 99999 PR PBB SHADOW E&M-EST. PATIENT-LVL III: ICD-10-PCS | Mod: PBBFAC,HCNC,, | Performed by: PHYSICIAN ASSISTANT

## 2020-07-27 RX ORDER — CITALOPRAM 10 MG/1
TABLET ORAL
COMMUNITY
Start: 2020-06-18 | End: 2020-10-08

## 2020-07-27 NOTE — PATIENT INSTRUCTIONS
Recommend CeraVe SA cream for rough and bumpy skin twice daily.    XEROSIS (DRY SKIN)        1. Definition    Xerosis is the term for dry skin.  We all have a natural oil coating over our skin produced by the skin oil glands.  If this oil is removed, the skin becomes dry which can lead to cracking, which can lead to inflammation.  Xerosis is usually a long-term problem that recurs often, especially in the winter.    2. Cause     Long hot baths or showers can remove our natural oil and lead to xerosis.  One should never take more than one bath or shower a day and for no longer than ten minutes.   Use of harsh soaps such as Zest, Dial, and Ivory can worsen and cause xerosis.   Cold winter weather worsens xerosis because the amount of moisture contained in cold air is much less than the amount of moisture in warm air.    3. Treatment     Treatment is intended to restore the natural oil to your skin.  Keep the skin lubricated.     Do not take more than one bath or shower a day.  Use lukewarm water, not hot.  Hot water dries out the skin.     Use a gentle moisturizing soap such as Cetaphil soap, Oil of Olay, Dove, Basis, Ivory moisture care, Restoraderm cleanser.     When toweling dry, dont rub.  Blot the skin so there is still some water left on the skin.  You should apply a moisturizing cream to all of the skin such as Cerave cream, Cetaphil cream, Restoraderm or Eucerin Original Formula cream.   Alpha hydroxyacid lotions, i.e., AmLactin, also work very well for preventing dry skin, but may burn when used on inflamed or reddened skin.     If you like to swim during the winter months, you should not use soap when getting out of the pool.  When you have finished swimming, rinse off the chlorine with cool to warm water.  If this will be the only shower of the day, then you may use Cetaphil or another mild soap to cleanse your skin.  After the shower, apply a moisturizing cream to all of the skin as  above.        [address] [place] [address]/ [phone] FAX/ [email or web address]

## 2020-07-27 NOTE — PROGRESS NOTES
Subjective:       Patient ID: Omid Rascon is a 60 y.o. Black or  female who presents for a follow up evaluation of uncontrolled HTN  The patient has a history of HTN possibly 2013, depression since lower back injury. She was diagnosed with a frontal skull lesion and in that context it was found that she has difficult to control BP.  She is She states to use a low salt diet. She also has a frontal bone lesion in her [place] and was seen in the past by neurosurgery, refusing biopsy, her bone lesion has dissapeared. She is not on any BP meds anymore, instead taking Meloxicam for pain as needed (not very often).     The patient tells me that her blood pressure at home runs in the 130's systolic.     HPI  Review of Systems   Constitutional: Negative for activity change, appetite change, chills, fatigue, fever and unexpected weight change.   HENT: Positive for hearing loss. Negative for nosebleeds.    Eyes: Negative.    Respiratory: Negative.  Negative for cough, chest tightness and shortness of breath.    Cardiovascular: Negative.  Negative for chest pain and leg swelling.   Gastrointestinal: Negative for abdominal pain, anal bleeding, diarrhea, nausea and vomiting.   Genitourinary: Negative for decreased urine volume, difficulty urinating, dysuria, flank pain, frequency, hematuria, pelvic pain, urgency and vaginal bleeding.   Musculoskeletal: Positive for back pain. Negative for joint swelling and myalgias.   Skin: Negative.  Negative for rash.   Neurological: Negative.    Psychiatric/Behavioral: Negative.    All other systems reviewed and are negative.      Objective:      Physical Exam  Vitals signs and nursing note reviewed.   Constitutional:       General: She is not in acute distress.     Appearance: Normal appearance. She is well-developed. She is not diaphoretic.   HENT:      Head: Normocephalic and atraumatic.      Right Ear: External ear normal.      Left Ear: External ear normal.       Nose: Nose normal.   Eyes:      Conjunctiva/sclera: Conjunctivae normal.      Pupils: Pupils are equal, round, and reactive to light.   Neck:      Musculoskeletal: Normal range of motion and neck supple.      Thyroid: No thyromegaly.   Cardiovascular:      Rate and Rhythm: Normal rate and regular rhythm.      Heart sounds: Murmur:     Pulmonary:      Effort: Pulmonary effort is normal. No respiratory distress.      Breath sounds: Normal breath sounds. No wheezing or rales.   Chest:      Chest wall: No tenderness.   Abdominal:      General: Bowel sounds are normal. There is no distension.      Palpations: Abdomen is soft.      Tenderness: There is no abdominal tenderness. There is no guarding or rebound.   Musculoskeletal: Normal range of motion.         General: No tenderness.   Skin:     General: Skin is warm and dry.   Neurological:      Mental Status: She is alert and oriented to person, place, and time.      Deep Tendon Reflexes: Reflexes are normal and symmetric.   Psychiatric:         Behavior: Behavior normal.         Thought Content: Thought content normal.         Judgment: Judgment normal.         Assessment:       1. White coat syndrome with diagnosis of hypertension    2. Renal cyst    3. CKD (chronic kidney disease), stage II    4. Vitamin D deficiency        Plan:       1. CKD2: creatinine 1.1 mg/dl. Not clear why and if this was when she was on.  Her renal ultrasound is not normal as it demonstrated signs of chronic kidney disease and a right sided cyst with septation.   In her routine work up a faint IgG lambda specific monoclonal band was present and she is followed by hematology - seemed to have dissapeared in her last KEVIN.    - repeat renal US for her complex cyst now          Lab Results   Component Value Date    CREATININE 1.1 07/22/2020     Protein Creatinine Ratios:    Prot/Creat Ratio, Ur   Date Value Ref Range Status   07/22/2020 0.22 (H) 0.00 - 0.20 Final   02/27/2017 0.09 0.00 - 0.20 Final    10/24/2016 0.11 0.00 - 0.20 Final     ·   ·   Acid-Base:   Lab Results   Component Value Date     07/22/2020    K 4.0 07/22/2020    CO2 27 07/22/2020     2. HTN: Blood pressures are controlled at home as she has ambulatory blood pressure monitoring with Mrs Mcclelland.  She has a form of white coat hypertension or stress induced hypertension.  Continue relaxation techniques and follow up with ambulatory blood pressure measuring.  - she again reports normal blood pressure readings when she measures it at home with systolic < 120 mmHG.    She does not want any more blood pressure medication.         Is not taking the medication below because of multiple adverse reactions  Amlodipine/HCTZ - swelling and rash??  Lisinopril and Benicar - rah all over the body  Spironolactone - lost her voice         The patient was educated in practicing a low salt diet.  Avoid high salt foods (olives, pickles, smoked meats, salted potato chips, etc.).   Do not add salt to your food at the table.   Use only small amounts of salt when cooking.        RTC in 6 month with nurse practitioner if needed, with renal US!

## 2020-07-27 NOTE — PROGRESS NOTES
Requested updates within Care Everywhere.  Patient's chart was reviewed for overdue EUGENE topics.  Immunizations reconciled.    Orders placed:n/a  Tasked appts:n/a  Labs Linked:n/a

## 2020-07-27 NOTE — PROGRESS NOTES
"  Subjective:       Patient ID:  Omid Rascon is a 60 y.o. female who presents for   Chief Complaint   Patient presents with    Dry Skin     elbows,and legs, X, rough and dry, otc tx      History of Present Illness: The patient presents with chief complaint of rough skin.  Location: elbows, thighs, knees  Duration: few months  Signs/Symptoms: dry, rough; denies itchinng  Prior treatments: otc moisturizer    Pt showers bid with warm/ hot water and "sensitive skin soap." Moisturizes with various otc creams but not consistently.        Review of Systems   Constitutional: Negative for fever.   Skin: Positive for dry skin. Negative for itching.   Hematologic/Lymphatic: Does not bruise/bleed easily.        Objective:    Physical Exam   Constitutional: She appears well-developed and well-nourished. No distress.   Neurological: She is alert and oriented to person, place, and time. She is not disoriented.   Psychiatric: She has a normal mood and affect.   Skin:   Areas Examined (abnormalities noted in diagram):   RUE Inspected  LUE Inspection Performed  RLE Inspected  LLE Inspection Performed              Diagram Legend     Erythematous scaling macule/papule c/w actinic keratosis       Vascular papule c/w angioma      Pigmented verrucoid papule/plaque c/w seborrheic keratosis      Yellow umbilicated papule c/w sebaceous hyperplasia      Irregularly shaped tan macule c/w lentigo     1-2 mm smooth white papules consistent with Milia      Movable subcutaneous cyst with punctum c/w epidermal inclusion cyst      Subcutaneous movable cyst c/w pilar cyst      Firm pink to brown papule c/w dermatofibroma      Pedunculated fleshy papule(s) c/w skin tag(s)      Evenly pigmented macule c/w junctional nevus     Mildly variegated pigmented, slightly irregular-bordered macule c/w mildly atypical nevus      Flesh colored to evenly pigmented papule c/w intradermal nevus       Pink pearly papule/plaque c/w basal cell carcinoma      " Erythematous hyperkeratotic cursted plaque c/w SCC      Surgical scar with no sign of skin cancer recurrence      Open and closed comedones      Inflammatory papules and pustules      Verrucoid papule consistent consistent with wart     Erythematous eczematous patches and plaques     Dystrophic onycholytic nail with subungual debris c/w onychomycosis     Umbilicated papule    Erythematous-base heme-crusted tan verrucoid plaque consistent with inflamed seborrheic keratosis     Erythematous Silvery Scaling Plaque c/w Psoriasis     See annotation      Assessment / Plan:        Frictional lichenoid dermatosis  Good skin care regimen discussed including limiting to one bath or shower/day, using lukewarm water with mild soap and moisturizing cream to skin 1 - 2x/day. Brochure was provided and reviewed with patient.    Recommend CeraVe SA crm bid to AA.         Follow up if symptoms worsen or fail to improve.

## 2020-07-27 NOTE — LETTER
July 27, 2020      Ti Hendrickson MD  [address]  [place] [address]           [facility]  [address]  [place] [address]  Phone: [phone]  Fax: [phone]          Patient: Omid Racson   MR Number: [id number]   YOB: 1960   Date of Visit: 7/27/2020       Dear Dr. Ti Hendrickson:    Thank you for referring Omid Rascon to me for evaluation. Attached you will find relevant portions of my assessment and plan of care.    If you have questions, please do not hesitate to call me. I look forward to following Omid Rascon along with you.    Sincerely,    Angelina Edmonds PA-C    Enclosure  CC:  No Recipients    If you would like to receive this communication electronically, please contact [email or web address] or [phone] to request more information on [facility] access.    For providers and/or their staff who would like to refer a patient to [facility], please contact us through our one-stop-shop provider referral line, [facility], at [phone].    If you feel you have received this communication in error or would no longer like to receive these types of communications, please e-mail [email or web address]

## 2020-07-28 ENCOUNTER — HOSPITAL ENCOUNTER (OUTPATIENT)
Dept: RADIOLOGY | Facility: HOSPITAL | Age: 60
Discharge: HOME | End: 2020-07-28
Attending: INTERNAL MEDICINE
Payer: MEDICARE

## 2020-07-28 DIAGNOSIS — N18.2 CKD (CHRONIC KIDNEY DISEASE), STAGE II: ICD-10-CM

## 2020-07-28 DIAGNOSIS — N28.1 RENAL CYST: ICD-10-CM

## 2020-07-28 PROCEDURE — 76770 US EXAM ABDO BACK WALL COMP: CPT | Mod: 26,HCNC,, | Performed by: RADIOLOGY

## 2020-07-28 PROCEDURE — 76770 US RETROPERITONEAL COMPLETE: ICD-10-PCS | Mod: 26,HCNC,, | Performed by: RADIOLOGY

## 2020-07-28 PROCEDURE — 76770 US EXAM ABDO BACK WALL COMP: CPT | Mod: TC,HCNC

## 2020-08-04 ENCOUNTER — OFFICE VISIT (OUTPATIENT)
Dept: OPTOMETRY | Facility: CLINIC | Age: 60
End: 2020-08-04
Payer: MEDICARE

## 2020-08-04 DIAGNOSIS — H52.7 REFRACTIVE ERROR: ICD-10-CM

## 2020-08-04 DIAGNOSIS — H25.13 NUCLEAR SCLEROSIS OF BOTH EYES: Primary | ICD-10-CM

## 2020-08-04 PROCEDURE — 92014 COMPRE OPH EXAM EST PT 1/>: CPT | Mod: HCNC,S$GLB,, | Performed by: OPTOMETRIST

## 2020-08-04 PROCEDURE — 92015 PR REFRACTION: ICD-10-PCS | Mod: HCNC,S$GLB,, | Performed by: OPTOMETRIST

## 2020-08-04 PROCEDURE — 92014 PR EYE EXAM, EST PATIENT,COMPREHESV: ICD-10-PCS | Mod: HCNC,S$GLB,, | Performed by: OPTOMETRIST

## 2020-08-04 PROCEDURE — 92015 DETERMINE REFRACTIVE STATE: CPT | Mod: HCNC,S$GLB,, | Performed by: OPTOMETRIST

## 2020-08-04 PROCEDURE — 99999 PR PBB SHADOW E&M-EST. PATIENT-LVL II: ICD-10-PCS | Mod: PBBFAC,HCNC,, | Performed by: OPTOMETRIST

## 2020-08-04 PROCEDURE — 99999 PR PBB SHADOW E&M-EST. PATIENT-LVL II: CPT | Mod: PBBFAC,HCNC,, | Performed by: OPTOMETRIST

## 2020-08-27 ENCOUNTER — PATIENT OUTREACH (OUTPATIENT)
Dept: ADMINISTRATIVE | Facility: OTHER | Age: 60
End: 2020-08-27

## 2020-08-27 DIAGNOSIS — Z12.11 ENCOUNTER FOR FIT (FECAL IMMUNOCHEMICAL TEST) SCREENING: Primary | ICD-10-CM

## 2020-08-27 NOTE — PROGRESS NOTES
Care Everywhere: updated  Immunization: updated  Health Maintenance: updated  Media Review: review for outside colon cancer report  Legacy Review:   Order placed: fecal immunochemical test  Upcoming appts:

## 2020-08-28 ENCOUNTER — OFFICE VISIT (OUTPATIENT)
Dept: OBSTETRICS AND GYNECOLOGY | Facility: CLINIC | Age: 60
End: 2020-08-28
Payer: MEDICARE

## 2020-08-28 VITALS
SYSTOLIC BLOOD PRESSURE: 186 MMHG | DIASTOLIC BLOOD PRESSURE: 110 MMHG | BODY MASS INDEX: 30.3 KG/M2 | WEIGHT: 181.88 LBS | HEIGHT: 65 IN

## 2020-08-28 DIAGNOSIS — Z01.419 WELL WOMAN EXAM WITH ROUTINE GYNECOLOGICAL EXAM: Primary | ICD-10-CM

## 2020-08-28 PROCEDURE — 99999 PR PBB SHADOW E&M-EST. PATIENT-LVL III: ICD-10-PCS | Mod: PBBFAC,HCNC,, | Performed by: OBSTETRICS & GYNECOLOGY

## 2020-08-28 PROCEDURE — G0101 PR CA SCREEN;PELVIC/BREAST EXAM: ICD-10-PCS | Mod: HCNC,S$GLB,, | Performed by: OBSTETRICS & GYNECOLOGY

## 2020-08-28 PROCEDURE — 99999 PR PBB SHADOW E&M-EST. PATIENT-LVL III: CPT | Mod: PBBFAC,HCNC,, | Performed by: OBSTETRICS & GYNECOLOGY

## 2020-08-28 PROCEDURE — G0101 CA SCREEN;PELVIC/BREAST EXAM: HCPCS | Mod: HCNC,S$GLB,, | Performed by: OBSTETRICS & GYNECOLOGY

## 2020-08-28 NOTE — PROGRESS NOTES
History & Physical  Gynecology      SUBJECTIVE:     Chief Complaint: Annual Exam       History of Present Illness:  Annual Exam-Postmenopausal  Patient presents for annual exam. The patient has no complaints today. Patient denies post-menopausal vaginal bleeding. The patient is not currently sexually active. The patient is not taking hormone replacement therapy.  The patient participates in regular exercise: yes.  She does not smoke.     GYN screening history: last pap: approximate date 2018 paphpv and was normal  Mammogram history: 2020  Colonoscopy history: 2018 normal per patient      Review of patient's allergies indicates:   Allergen Reactions    Lactose     Sulfa (sulfonamide antibiotics) Swelling    Latex, natural rubber Rash    Shellfish containing products Rash    Strawberries [strawberry] Rash       Past Medical History:   Diagnosis Date    Anemia     CKD (chronic kidney disease), stage II 2016    Depression     Hemoglobin C trait     Hypertension     Impaired hearing     Lumbago 8/10/2015    Monoclonal gammopathy 11/10/2016    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    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     Past Surgical History:   Procedure Laterality Date    ABLATION      LASER ABLATION OF THE CERVIX      NO PAST SURGERIES       OB History        4    Para   2    Term   2            AB   2    Living           SAB   2    TAB        Ectopic        Multiple        Live Births                   Family History   Problem Relation Age of Onset    Stroke Father     Heart failure Mother     No Known Problems Sister     No Known Problems Brother     No Known Problems Brother     No Known Problems Brother     No Known Problems Sister     No Known Problems Sister     No Known Problems Sister     No Known Problems Brother     No Known Problems Sister     Diabetes Sister     No Known Problems Maternal Aunt     No Known Problems Maternal Uncle     No Known Problems  Paternal Aunt     No Known Problems Paternal Uncle     No Known Problems Maternal Grandmother     No Known Problems Maternal Grandfather     No Known Problems Paternal Grandmother     No Known Problems Paternal Grandfather     Amblyopia Neg Hx     Blindness Neg Hx     Cancer Neg Hx     Cataracts Neg Hx     Glaucoma Neg Hx     Hypertension Neg Hx     Macular degeneration Neg Hx     Retinal detachment Neg Hx     Strabismus Neg Hx     Thyroid disease Neg Hx      Social History     Tobacco Use    Smoking status: Never Smoker    Smokeless tobacco: Never Used   Substance Use Topics    Alcohol use: No    Drug use: No       Current Outpatient Medications   Medication Sig    cetirizine (ZYRTEC) 10 MG tablet Take 1 tablet (10 mg total) by mouth daily as needed for Allergies or Rhinitis.    citalopram (CELEXA) 10 MG tablet     meloxicam (MOBIC) 15 MG tablet Take 1 tablet (15 mg total) by mouth daily as needed (low back pain). Change to daily as needed for pain if possible    traZODone (DESYREL) 50 MG tablet TK 1/2 T PO QD HS PRF SLP    citalopram (CELEXA) 20 MG tablet     tiZANidine (ZANAFLEX) 2 MG tablet Take 1-2 tabs PO QHS PRN muscle pain/spasms.  Do not mix with trazodone     No current facility-administered medications for this visit.        Review of Systems:  Review of Systems   Constitutional: Negative for fever.   Gastrointestinal: Negative for nausea and vomiting.   Genitourinary: Negative for menorrhagia, menstrual problem, pelvic pain, vaginal bleeding, vaginal discharge, vaginal pain and vaginal odor.        OBJECTIVE:     Physical Exam:  Physical Exam  Vitals signs and nursing note reviewed.   Constitutional:       Appearance: She is well-developed.   Neck:      Musculoskeletal: Normal range of motion and neck supple.      Thyroid: No thyromegaly.      Trachea: No tracheal deviation.   Cardiovascular:      Rate and Rhythm: Normal rate and regular rhythm.      Heart sounds: Normal heart  sounds.   Pulmonary:      Effort: Pulmonary effort is normal.      Breath sounds: Normal breath sounds.   Chest:      Breasts: Breasts are symmetrical.         Right: No inverted nipple, mass, nipple discharge, skin change or tenderness.         Left: No inverted nipple, mass, nipple discharge, skin change or tenderness.   Abdominal:      Palpations: Abdomen is soft.   Genitourinary:     General: Normal vulva.      Labia:         Right: No rash, tenderness, lesion or injury.         Left: No rash, tenderness, lesion or injury.       Urethra: No prolapse, urethral pain, urethral swelling or urethral lesion.      Vagina: Normal. No signs of injury and foreign body. No vaginal discharge, erythema, tenderness or bleeding.      Cervix: No cervical motion tenderness, discharge or friability.      Uterus: Not deviated, not enlarged, not fixed and not tender.       Adnexa:         Right: No mass, tenderness or fullness.          Left: No mass, tenderness or fullness.        Rectum: No anal fissure or external hemorrhoid.      Comments: Urethral meatus: normal size, anterior vaginal wall with no prolapse, no lesions  Bladder: no fullness, masses or tenderness  Neurological:      Mental Status: She is alert and oriented to person, place, and time.   Psychiatric:         Behavior: Behavior normal.         Thought Content: Thought content normal.         Judgment: Judgment normal.         Chaperoned by: Jamil    ASSESSMENT:       ICD-10-CM ICD-9-CM    1. Well woman exam with routine gynecological exam  Z01.419 V72.31           Plan:      Omid was seen today for annual exam.    Diagnoses and all orders for this visit:    Well woman exam with routine gynecological exam        No orders of the defined types were placed in this encounter.      Well Woman:   - Pap smear: up to date  - Mammogram: up to date  - Colonoscopy: up to date  - Dexa: due age 64  - Immunizations: with pcp  - Labs: with pcp  - Exercise counseling  provided      Follow up in one year for annual, or prn.    Juhi Polanco

## 2020-09-04 ENCOUNTER — PATIENT OUTREACH (OUTPATIENT)
Dept: OTHER | Facility: OTHER | Age: 60
End: 2020-09-04

## 2020-09-04 NOTE — PROGRESS NOTES
Digital Medicine: Health  Follow-Up    The history is provided by the patient.             Reason for review: Blood pressure at goal        Topics Covered on Call: physical activity, Diet and weight loss/ new blood pressure cuff    Additional Follow-up details: Ms. Rascon expressed concern about her weight gain. I suggested that she speak with her physician about a possible referral to a dietician or to bariatrics. She plans to do that at her next appointment in October.    She explained that she is still using her withings cuff, although she purchased a new cuff at the [employer]. She doesn't believe that it was connected properly to [employer]. She discussed that the readings between the two cuffs are comparable. I offered to have a tech  reach out to her to assist, but she prefers to work on it herself. She explains that she needs to change the password, which she hasn't had the time to do yet.        Diet-no change to diet    No change to diet.  Patient reports eating or drinking the following: Ms. Rascon explained that her previous health  had given her menu ideas and tips on how to eliminate sodium in her diet, which she continues to utilize.       Physical Activity-Change      Additional physical activity details: Unable to exercise due to covid 19 concerns and the hot and humid weather. She was swimming regularly prior to March. We discussed the importance for her to consider her history of chronic pain as she considers exercise.       Medication Adherence-Medication Adherence not addressed.      Substance, Sleep, Stress-Not assessed      Continue current diet/physical activity routine.       Patient verbalizes understanding.      Explained the importance of self-monitoring and medication adherence. Encouraged the patient to communicate with their health  for lifestyle modifications to help improve or maintain a healthy lifestyle.            There are no preventive care reminders to  display for this patient.    Last 5 Patient Entered Readings                                      Current 30 Day Average: 117/73     Recent Readings 8/28/2020 8/28/2020 8/25/2020 8/25/2020 8/21/2020    SBP (mmHg) 120 120 105 105 112    DBP (mmHg) 72 72 70 70 76    Pulse 67 - 71 - 85

## 2020-09-30 PROCEDURE — 99458 RPM TX MGMT EA ADDL 20 MIN: CPT | Mod: S$GLB,,, | Performed by: INTERNAL MEDICINE

## 2020-09-30 PROCEDURE — 99457 PR MONITORING, PHYSIOL PARAM, REMOTE, 1ST 20 MINS, PER MONTH: ICD-10-PCS | Mod: S$GLB,,, | Performed by: INTERNAL MEDICINE

## 2020-09-30 PROCEDURE — 99458 PR REMOTE PHYSIOL MONIT, EA ADDTL 20 MINS: ICD-10-PCS | Mod: S$GLB,,, | Performed by: INTERNAL MEDICINE

## 2020-09-30 PROCEDURE — 99457 RPM TX MGMT 1ST 20 MIN: CPT | Mod: S$GLB,,, | Performed by: INTERNAL MEDICINE

## 2020-10-08 ENCOUNTER — OFFICE VISIT (OUTPATIENT)
Dept: FAMILY MEDICINE | Facility: CLINIC | Age: 60
End: 2020-10-08
Payer: MEDICARE

## 2020-10-08 VITALS
TEMPERATURE: 98 F | HEIGHT: 64 IN | HEART RATE: 101 BPM | DIASTOLIC BLOOD PRESSURE: 120 MMHG | WEIGHT: 180.75 LBS | OXYGEN SATURATION: 98 % | BODY MASS INDEX: 30.86 KG/M2 | SYSTOLIC BLOOD PRESSURE: 180 MMHG

## 2020-10-08 DIAGNOSIS — Z00.00 ROUTINE MEDICAL EXAM: Primary | ICD-10-CM

## 2020-10-08 DIAGNOSIS — D58.2 HEMOGLOBIN C TRAIT: ICD-10-CM

## 2020-10-08 DIAGNOSIS — I77.1 TORTUOUS AORTA: Chronic | ICD-10-CM

## 2020-10-08 DIAGNOSIS — Z12.11 ENCOUNTER FOR FIT (FECAL IMMUNOCHEMICAL TEST) SCREENING: ICD-10-CM

## 2020-10-08 DIAGNOSIS — N18.2 CKD (CHRONIC KIDNEY DISEASE), STAGE II: Chronic | ICD-10-CM

## 2020-10-08 DIAGNOSIS — M54.2 NECK PAIN, ACUTE: ICD-10-CM

## 2020-10-08 DIAGNOSIS — E04.2 MULTINODULAR THYROID: Chronic | ICD-10-CM

## 2020-10-08 DIAGNOSIS — I10 WHITE COAT SYNDROME WITH DIAGNOSIS OF HYPERTENSION: Chronic | ICD-10-CM

## 2020-10-08 DIAGNOSIS — Z13.6 SCREENING FOR CARDIOVASCULAR CONDITION: ICD-10-CM

## 2020-10-08 DIAGNOSIS — F33.41 RECURRENT MAJOR DEPRESSIVE DISORDER, IN PARTIAL REMISSION: ICD-10-CM

## 2020-10-08 PROCEDURE — 3008F PR BODY MASS INDEX (BMI) DOCUMENTED: ICD-10-PCS | Mod: HCNC,CPTII,S$GLB, | Performed by: INTERNAL MEDICINE

## 2020-10-08 PROCEDURE — 99999 PR PBB SHADOW E&M-EST. PATIENT-LVL IV: CPT | Mod: PBBFAC,HCNC,, | Performed by: INTERNAL MEDICINE

## 2020-10-08 PROCEDURE — 99396 PREV VISIT EST AGE 40-64: CPT | Mod: HCNC,S$GLB,, | Performed by: INTERNAL MEDICINE

## 2020-10-08 PROCEDURE — 99499 UNLISTED E&M SERVICE: CPT | Mod: S$GLB,,, | Performed by: INTERNAL MEDICINE

## 2020-10-08 PROCEDURE — 3080F PR MOST RECENT DIASTOLIC BLOOD PRESSURE >= 90 MM HG: ICD-10-PCS | Mod: HCNC,CPTII,S$GLB, | Performed by: INTERNAL MEDICINE

## 2020-10-08 PROCEDURE — 3080F DIAST BP >= 90 MM HG: CPT | Mod: HCNC,CPTII,S$GLB, | Performed by: INTERNAL MEDICINE

## 2020-10-08 PROCEDURE — 99396 PR PREVENTIVE VISIT,EST,40-64: ICD-10-PCS | Mod: HCNC,S$GLB,, | Performed by: INTERNAL MEDICINE

## 2020-10-08 PROCEDURE — 3008F BODY MASS INDEX DOCD: CPT | Mod: HCNC,CPTII,S$GLB, | Performed by: INTERNAL MEDICINE

## 2020-10-08 PROCEDURE — 99499 RISK ADDL DX/OHS AUDIT: ICD-10-PCS | Mod: S$GLB,,, | Performed by: INTERNAL MEDICINE

## 2020-10-08 PROCEDURE — 99999 PR PBB SHADOW E&M-EST. PATIENT-LVL IV: ICD-10-PCS | Mod: PBBFAC,HCNC,, | Performed by: INTERNAL MEDICINE

## 2020-10-08 PROCEDURE — 3077F SYST BP >= 140 MM HG: CPT | Mod: HCNC,CPTII,S$GLB, | Performed by: INTERNAL MEDICINE

## 2020-10-08 PROCEDURE — 3077F PR MOST RECENT SYSTOLIC BLOOD PRESSURE >= 140 MM HG: ICD-10-PCS | Mod: HCNC,CPTII,S$GLB, | Performed by: INTERNAL MEDICINE

## 2020-10-08 RX ORDER — TIZANIDINE 2 MG/1
TABLET ORAL
Qty: 60 TABLET | Refills: 2 | Status: ON HOLD | OUTPATIENT
Start: 2020-10-08 | End: 2022-09-14 | Stop reason: HOSPADM

## 2020-10-08 NOTE — PROGRESS NOTES
Assessment & Plan  Problem List Items Addressed This Visit        Psychiatric    Recurrent major depressive disorder, in partial remission (Chronic)    Current Assessment & Plan     Followed at [facility].  Monitor            Cardiac/Vascular    White coat syndrome with diagnosis of hypertension (Chronic)    Overview     In Digital HTN program with excellent control.          Current Assessment & Plan     Known to have documented white coat HTN.  Continue to monitor home BPs         Tortuous aorta (Chronic)    Overview     Stable, asymptomatic chronic condition.  Will continue to maximize risk factor reduction and adjust medication as needed.          Relevant Orders    Lipid Panel       Renal/    CKD (chronic kidney disease), stage II (Chronic)    Current Assessment & Plan     Known to renal.  Monitor             Oncology    Hemoglobin C trait (Chronic)    Overview     Followed by Heme Onc         Current Assessment & Plan     Monitor follow ups with Heme/Onc            Endocrine    Multinodular thyroid (Chronic)    Overview     Seen on MRI c-spine.  Reassuring US 2019         Current Assessment & Plan     Recheck TSH         Relevant Orders    TSH      Other Visit Diagnoses     Routine medical exam    -  Primary  -    Discussed healthy diet, regular exercise, necessary labs, age appropriate cancer screening, and routine vaccinations.       Screening for cardiovascular condition   -  Screen lipids     Relevant Orders    Lipid Panel    Encounter for FIT (fecal immunochemical test) screening    -  FitKit was given to patient on 10/8/2020 1:03 PM          Relevant Orders    Fecal Immunochemical Test (iFOBT)    Neck pain, acute    -  Start tizanidine at night. I have discussed the common side effects of this medication and black box warnings (if applicable to this medication) with the patient and answered all of the questions they had at the time of this visit regarding this medication.     Relevant  Medications    tiZANidine (ZANAFLEX) 2 MG tablet            Health Maintenance reviewed, continues to decline all vaccines.    Follow-up: No follow-ups on file.  ______________________________________________________________________    Chief Complaint  Chief Complaint   Patient presents with    Annual Exam       HPI  Margarethashok Rascon is a 60 y.o. female with medical diagnoses as listed in the medical history and problem list that presents for routine physical.  Pt is known to me with her last appointment 08/2019.  She is due for labs    C/o right sided neck pain/stiffness.  Radiates to trapezius.  No clear inciting event.  NSAIDs not helping.      No other acute complaints.       PAST MEDICAL HISTORY:  Past Medical History:   Diagnosis Date    Anemia     CKD (chronic kidney disease), stage II 5/16/2016    Depression     Hemoglobin C trait     Hypertension     Impaired hearing     Lumbago 8/10/2015    Monoclonal gammopathy 11/10/2016    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    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       PAST SURGICAL HISTORY:  Past Surgical History:   Procedure Laterality Date    ABLATION  2008    LASER ABLATION OF THE CERVIX      NO PAST SURGERIES         SOCIAL HISTORY:  Social History     Socioeconomic History    Marital status: Other     Spouse name: Not on file    Number of children: Not on file    Years of education: Not on file    Highest education level: Not on file   Occupational History    Not on file   Social Needs    Financial resource strain: Not very hard    Food insecurity     Worry: Sometimes true     Inability: Never true    Transportation needs     Medical: No     Non-medical: No   Tobacco Use    Smoking status: Never Smoker    Smokeless tobacco: Never Used   Substance and Sexual Activity    Alcohol use: No     Frequency: Never     Drinks per session: Patient refused     Binge frequency: Never    Drug use: No    Sexual activity: Yes     Partners: Male     Birth  control/protection: See Surgical Hx   Lifestyle    Physical activity     Days per week: 5 days     Minutes per session: 60 min    Stress: Not at all   Relationships    Social connections     Talks on phone: More than three times a week     Gets together: More than three times a week     Attends Taoist service: Not on file     Active member of club or organization: Yes     Attends meetings of clubs or organizations: More than 4 times per year     Relationship status:    Other Topics Concern    Not on file   Social History Narrative    Not on file       FAMILY HISTORY:  Family History   Problem Relation Age of Onset    Stroke Father     Heart failure Mother     No Known Problems Sister     No Known Problems Brother     No Known Problems Brother     No Known Problems Brother     No Known Problems Sister     No Known Problems Sister     No Known Problems Sister     No Known Problems Brother     No Known Problems Sister     Diabetes Sister     No Known Problems Maternal Aunt     No Known Problems Maternal Uncle     No Known Problems Paternal Aunt     No Known Problems Paternal Uncle     No Known Problems Maternal Grandmother     No Known Problems Maternal Grandfather     No Known Problems Paternal Grandmother     No Known Problems Paternal Grandfather     Amblyopia Neg Hx     Blindness Neg Hx     Cancer Neg Hx     Cataracts Neg Hx     Glaucoma Neg Hx     Hypertension Neg Hx     Macular degeneration Neg Hx     Retinal detachment Neg Hx     Strabismus Neg Hx     Thyroid disease Neg Hx        ALLERGIES AND MEDICATIONS: updated and reviewed.  Review of patient's allergies indicates:   Allergen Reactions    Lactose     Sulfa (sulfonamide antibiotics) Swelling    Latex, natural rubber Rash    Shellfish containing products Rash    Strawberries [strawberry] Rash     Current Outpatient Medications   Medication Sig Dispense Refill    cetirizine (ZYRTEC) 10 MG tablet Take 1  "tablet (10 mg total) by mouth daily as needed for Allergies or Rhinitis. 90 tablet 3    meloxicam (MOBIC) 15 MG tablet Take 1 tablet (15 mg total) by mouth daily as needed (low back pain). Change to daily as needed for pain if possible (Patient not taking: Reported on 10/8/2020) 90 tablet 0    tiZANidine (ZANAFLEX) 2 MG tablet Take 1-2 tabs PO QHS PRN muscle pain/spasms 60 tablet 2     No current facility-administered medications for this visit.          ROS  Review of Systems   Constitutional: Negative for chills, fever and unexpected weight change.   HENT: Negative for congestion, ear pain, hearing loss, rhinorrhea, sore throat and trouble swallowing.    Eyes: Negative for discharge, redness and visual disturbance.   Respiratory: Negative for cough, chest tightness, shortness of breath and wheezing.    Cardiovascular: Negative for chest pain, palpitations and leg swelling.   Gastrointestinal: Negative for abdominal pain, constipation, diarrhea, nausea and vomiting.   Endocrine: Negative for polydipsia, polyphagia and polyuria.   Genitourinary: Negative for decreased urine volume, dysuria and hematuria.   Musculoskeletal: Positive for neck pain. Negative for arthralgias, joint swelling and myalgias.   Skin: Negative for color change and rash.   Neurological: Negative for dizziness, weakness, light-headedness and headaches.   Psychiatric/Behavioral: Negative for decreased concentration, dysphoric mood, sleep disturbance and suicidal ideas.           Physical Exam  Vitals:    10/08/20 1116   BP: (!) 180/120   Pulse: 101   Temp: 98.1 °F (36.7 °C)   SpO2: 98%   Weight: 82 kg (180 lb 12.4 oz)   Height: 5' 4" (1.626 m)    Body mass index is 31.03 kg/m².  Weight: 82 kg (180 lb 12.4 oz)   Height: 5' 4" (162.6 cm)   Physical Exam  Constitutional:       General: She is not in acute distress.     Appearance: She is well-developed.   HENT:      Head: Normocephalic and atraumatic.   Eyes:      General: Lids are normal. No " scleral icterus.     Conjunctiva/sclera: Conjunctivae normal.      Pupils: Pupils are equal, round, and reactive to light.   Neck:      Musculoskeletal: Full passive range of motion without pain and neck supple.      Thyroid: No thyromegaly.      Vascular: No carotid bruit or JVD.   Cardiovascular:      Rate and Rhythm: Normal rate and regular rhythm.      Pulses: Normal pulses.      Heart sounds: Normal heart sounds. No murmur. No friction rub. No S3 or S4 sounds.    Pulmonary:      Effort: Pulmonary effort is normal.      Breath sounds: Normal breath sounds. No wheezing, rhonchi or rales.   Abdominal:      General: Bowel sounds are normal.      Palpations: Abdomen is soft.      Tenderness: There is no abdominal tenderness.   Musculoskeletal:         General: Tenderness (right trapezius) present.      Right lower leg: No edema.      Left lower leg: No edema.   Skin:     General: Skin is warm and dry.      Findings: No rash.   Neurological:      Mental Status: She is alert and oriented to person, place, and time.   Psychiatric:         Speech: Speech normal.         Behavior: Behavior normal.         Thought Content: Thought content normal.             Health Maintenance       Date Due Completion Date    Colorectal Cancer Screening 08/01/2020 8/1/2010 (Done)    Override on 8/1/2010: Done (reportedly normal per patient)    Cervical Cancer Screening 04/20/2021 4/20/2018    Mammogram 07/02/2022 7/2/2020    Lipid Panel 01/08/2024 1/8/2019    TETANUS VACCINE 04/10/2027 4/10/2017

## 2020-10-08 NOTE — LETTER
October 8, 2020    Omid Rascon  [address] [place] [address]             [facility]  [address]  [place] [address]  Phone: [phone]  Fax: [phone] To Whom It May Concern Regarding Mrs. Rascon:    She has a well documented case of white coat hypertension and continues to maintain normal home BP readings that tend to run in the 100s/70s range which would make her susceptible for hypotension if medications are used on a PRN bases.  Please take this into consideration when seeing her for her dental appointments.       If you have any questions or concerns, please don't hesitate to call.    Sincerely,        Ti Hendrickson MD

## 2020-10-09 ENCOUNTER — LAB VISIT (OUTPATIENT)
Dept: LAB | Facility: HOSPITAL | Age: 60
End: 2020-10-09
Attending: INTERNAL MEDICINE
Payer: MEDICARE

## 2020-10-09 DIAGNOSIS — D64.9 ANEMIA, UNSPECIFIED TYPE: ICD-10-CM

## 2020-10-09 DIAGNOSIS — I77.1 TORTUOUS AORTA: Chronic | ICD-10-CM

## 2020-10-09 DIAGNOSIS — Z13.6 SCREENING FOR CARDIOVASCULAR CONDITION: ICD-10-CM

## 2020-10-09 DIAGNOSIS — D47.2 MGUS (MONOCLONAL GAMMOPATHY OF UNKNOWN SIGNIFICANCE): ICD-10-CM

## 2020-10-09 DIAGNOSIS — E04.2 MULTINODULAR THYROID: Chronic | ICD-10-CM

## 2020-10-09 LAB
ALBUMIN SERPL BCP-MCNC: 4.2 G/DL (ref 3.5–5.2)
ALP SERPL-CCNC: 47 U/L (ref 55–135)
ALT SERPL W/O P-5'-P-CCNC: 20 U/L (ref 10–44)
ANION GAP SERPL CALC-SCNC: 7 MMOL/L (ref 8–16)
AST SERPL-CCNC: 21 U/L (ref 10–40)
BASOPHILS # BLD AUTO: 0.05 K/UL (ref 0–0.2)
BASOPHILS NFR BLD: 1.2 % (ref 0–1.9)
BILIRUB SERPL-MCNC: 0.5 MG/DL (ref 0.1–1)
BUN SERPL-MCNC: 18 MG/DL (ref 8–23)
CALCIUM SERPL-MCNC: 9.5 MG/DL (ref 8.7–10.5)
CHLORIDE SERPL-SCNC: 105 MMOL/L (ref 95–110)
CHOLEST SERPL-MCNC: 163 MG/DL (ref 120–199)
CHOLEST/HDLC SERPL: 2.9 {RATIO} (ref 2–5)
CO2 SERPL-SCNC: 28 MMOL/L (ref 23–29)
CREAT SERPL-MCNC: 1.2 MG/DL (ref 0.5–1.4)
DIFFERENTIAL METHOD: ABNORMAL
EOSINOPHIL # BLD AUTO: 0.1 K/UL (ref 0–0.5)
EOSINOPHIL NFR BLD: 1.5 % (ref 0–8)
ERYTHROCYTE [DISTWIDTH] IN BLOOD BY AUTOMATED COUNT: 14.2 % (ref 11.5–14.5)
EST. GFR  (AFRICAN AMERICAN): 56 ML/MIN/1.73 M^2
EST. GFR  (NON AFRICAN AMERICAN): 49 ML/MIN/1.73 M^2
FERRITIN SERPL-MCNC: 72 NG/ML (ref 20–300)
GLUCOSE SERPL-MCNC: 95 MG/DL (ref 70–110)
HCT VFR BLD AUTO: 35 % (ref 37–48.5)
HDLC SERPL-MCNC: 57 MG/DL (ref 40–75)
HDLC SERPL: 35 % (ref 20–50)
HGB BLD-MCNC: 12 G/DL (ref 12–16)
IMM GRANULOCYTES # BLD AUTO: 0.01 K/UL (ref 0–0.04)
IMM GRANULOCYTES NFR BLD AUTO: 0.2 % (ref 0–0.5)
LDLC SERPL CALC-MCNC: 94.4 MG/DL (ref 63–159)
LYMPHOCYTES # BLD AUTO: 1.7 K/UL (ref 1–4.8)
LYMPHOCYTES NFR BLD: 42.3 % (ref 18–48)
MCH RBC QN AUTO: 27.5 PG (ref 27–31)
MCHC RBC AUTO-ENTMCNC: 34.3 G/DL (ref 32–36)
MCV RBC AUTO: 80 FL (ref 82–98)
MONOCYTES # BLD AUTO: 0.3 K/UL (ref 0.3–1)
MONOCYTES NFR BLD: 8.4 % (ref 4–15)
NEUTROPHILS # BLD AUTO: 1.9 K/UL (ref 1.8–7.7)
NEUTROPHILS NFR BLD: 46.4 % (ref 38–73)
NONHDLC SERPL-MCNC: 106 MG/DL
NRBC BLD-RTO: 0 /100 WBC
PLATELET # BLD AUTO: 178 K/UL (ref 150–350)
PMV BLD AUTO: 10.3 FL (ref 9.2–12.9)
POTASSIUM SERPL-SCNC: 3.8 MMOL/L (ref 3.5–5.1)
PROT SERPL-MCNC: 7.9 G/DL (ref 6–8.4)
RBC # BLD AUTO: 4.36 M/UL (ref 4–5.4)
SODIUM SERPL-SCNC: 140 MMOL/L (ref 136–145)
TRIGL SERPL-MCNC: 58 MG/DL (ref 30–150)
TSH SERPL DL<=0.005 MIU/L-ACNC: 0.97 UIU/ML (ref 0.4–4)
WBC # BLD AUTO: 4.07 K/UL (ref 3.9–12.7)

## 2020-10-09 PROCEDURE — 84443 ASSAY THYROID STIM HORMONE: CPT | Mod: HCNC

## 2020-10-09 PROCEDURE — 36415 COLL VENOUS BLD VENIPUNCTURE: CPT | Mod: HCNC,PO

## 2020-10-09 PROCEDURE — 85025 COMPLETE CBC W/AUTO DIFF WBC: CPT | Mod: HCNC

## 2020-10-09 PROCEDURE — 80053 COMPREHEN METABOLIC PANEL: CPT | Mod: HCNC

## 2020-10-09 PROCEDURE — 80061 LIPID PANEL: CPT | Mod: HCNC

## 2020-10-09 PROCEDURE — 82728 ASSAY OF FERRITIN: CPT | Mod: HCNC

## 2020-10-09 PROCEDURE — 83520 IMMUNOASSAY QUANT NOS NONAB: CPT | Mod: 59,HCNC

## 2020-10-09 NOTE — PROGRESS NOTES
Your thyroid and cholesterol results are normal.  Please continue your current medications and doses.  We can see each other back in 1 year for a routine check up unless you need me sooner.     Please feel free to contact me with any questions or concerns.    Sincerely,  Ti Hendrickson  [email or web address]

## 2020-10-12 LAB
KAPPA LC SER QL IA: 2.24 MG/DL (ref 0.33–1.94)
KAPPA LC/LAMBDA SER IA: 1.41 (ref 0.26–1.65)
LAMBDA LC SER QL IA: 1.59 MG/DL (ref 0.57–2.63)

## 2020-10-19 ENCOUNTER — LAB VISIT (OUTPATIENT)
Dept: LAB | Facility: HOSPITAL | Age: 60
End: 2020-10-19
Attending: INTERNAL MEDICINE
Payer: MEDICARE

## 2020-10-19 DIAGNOSIS — Z12.11 ENCOUNTER FOR FIT (FECAL IMMUNOCHEMICAL TEST) SCREENING: ICD-10-CM

## 2020-10-19 PROCEDURE — 82274 ASSAY TEST FOR BLOOD FECAL: CPT | Mod: HCNC

## 2020-10-21 ENCOUNTER — TELEPHONE (OUTPATIENT)
Dept: HEMATOLOGY/ONCOLOGY | Facility: CLINIC | Age: 60
End: 2020-10-21

## 2020-10-21 LAB — HEMOCCULT STL QL IA: NEGATIVE

## 2020-10-22 ENCOUNTER — OFFICE VISIT (OUTPATIENT)
Dept: HEMATOLOGY/ONCOLOGY | Facility: CLINIC | Age: 60
End: 2020-10-22
Payer: MEDICARE

## 2020-10-22 VITALS
SYSTOLIC BLOOD PRESSURE: 189 MMHG | HEIGHT: 64 IN | DIASTOLIC BLOOD PRESSURE: 120 MMHG | WEIGHT: 180.69 LBS | HEART RATE: 81 BPM | OXYGEN SATURATION: 100 % | BODY MASS INDEX: 30.85 KG/M2 | RESPIRATION RATE: 16 BRPM

## 2020-10-22 DIAGNOSIS — D58.2 HEMOGLOBIN C TRAIT: Primary | Chronic | ICD-10-CM

## 2020-10-22 PROCEDURE — 3080F PR MOST RECENT DIASTOLIC BLOOD PRESSURE >= 90 MM HG: ICD-10-PCS | Mod: HCNC,CPTII,S$GLB, | Performed by: INTERNAL MEDICINE

## 2020-10-22 PROCEDURE — 99999 PR PBB SHADOW E&M-EST. PATIENT-LVL III: ICD-10-PCS | Mod: PBBFAC,HCNC,, | Performed by: INTERNAL MEDICINE

## 2020-10-22 PROCEDURE — 3077F SYST BP >= 140 MM HG: CPT | Mod: HCNC,CPTII,S$GLB, | Performed by: INTERNAL MEDICINE

## 2020-10-22 PROCEDURE — 99499 RISK ADDL DX/OHS AUDIT: ICD-10-PCS | Mod: S$GLB,,, | Performed by: INTERNAL MEDICINE

## 2020-10-22 PROCEDURE — 99215 OFFICE O/P EST HI 40 MIN: CPT | Mod: HCNC,S$GLB,, | Performed by: INTERNAL MEDICINE

## 2020-10-22 PROCEDURE — 3080F DIAST BP >= 90 MM HG: CPT | Mod: HCNC,CPTII,S$GLB, | Performed by: INTERNAL MEDICINE

## 2020-10-22 PROCEDURE — 3008F PR BODY MASS INDEX (BMI) DOCUMENTED: ICD-10-PCS | Mod: HCNC,CPTII,S$GLB, | Performed by: INTERNAL MEDICINE

## 2020-10-22 PROCEDURE — 99215 PR OFFICE/OUTPT VISIT, EST, LEVL V, 40-54 MIN: ICD-10-PCS | Mod: HCNC,S$GLB,, | Performed by: INTERNAL MEDICINE

## 2020-10-22 PROCEDURE — 99499 UNLISTED E&M SERVICE: CPT | Mod: S$GLB,,, | Performed by: INTERNAL MEDICINE

## 2020-10-22 PROCEDURE — 99999 PR PBB SHADOW E&M-EST. PATIENT-LVL III: CPT | Mod: PBBFAC,HCNC,, | Performed by: INTERNAL MEDICINE

## 2020-10-22 PROCEDURE — 3008F BODY MASS INDEX DOCD: CPT | Mod: HCNC,CPTII,S$GLB, | Performed by: INTERNAL MEDICINE

## 2020-10-22 PROCEDURE — 3077F PR MOST RECENT SYSTOLIC BLOOD PRESSURE >= 140 MM HG: ICD-10-PCS | Mod: HCNC,CPTII,S$GLB, | Performed by: INTERNAL MEDICINE

## 2020-10-23 NOTE — PROGRESS NOTES
Subjective:       Patient ID: Omid Rascon is a 60 y.o. female.    Chief Complaint: No chief complaint on file.      Today's Visit  Return visit scheduled by patient to review recent labs. Anemia has resolved. Ferritin is normal. M protein previously resolved. Free light chain ration normal on repeated checks. Seeing nephrology for renal cyst, mild decrease in GFR. Repeat US of kidneys in January.    HPI  Mrs. Rascon is a 56 year old black female from the [place] with history for hypertension, hgb C trait, hearing impairment, CKD2 and history of back injury who returns for follow up regarding her history of a right frontal head ba first detected January 2016 and later associated with an MGUS that was detected 11/10/16. MGUS was no longer detected in 2/27/17.  Her right frontal calvarial mass resolved spontaneously by April 2017. In August 2017, patient had a mild elevation in serum kappa light chain 3.42 mg/dL with ratio 1.97. Repeat 12/29/17 showed decrease in kappa light chain to 2.95 with ratio of 1.77.  On prior visits, complained of more fatigue and more prominent back pain. No headache, changes in vision. No weight loss or night sweats, masses or lymphadenoapthy. Hgb slightly lower than prior at 11.4 g/dL. Has had chronically low MCV and MCH 77 and 26.1 respectively. She has normal plt and WBC. Cr on 12/11/17 was 1.0. Iron studies were within normal range. She had an MRI of spine 1/8/18 which showed focal area of marrow signal abnormality posterior to the T4 vertebral body, possibly a normal variant and a focal area of marrow signal abnormality posterior to the L4 vertebral body which is favored to represent a slightly atypical osseous hemangioma. Patient comes for follow up for anemia and slight elevation in light chains. Last light chain analysis showed a . Hgb 11.8 which is stable baseline. 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 Cr today 1.2. BP in doctor's  office is always high. She states it is controlled at home.      Remote History:  Mrs. Rascon was evaluated by Dr. Keith of neurosurgery in January 2016 for a right scalp mass. She had imaging studies 1/28/16 that included head CT and brain MRI. Right frontal T1 hypointense calvarial lesion with prominence of the overlying soft tissues. Additionally, there is a mildly enhancing dural lesion along the anterior falx without evidence for intra-axial enhancement or parenchymal lesion. Lucencies in the right frontal calvarium and hyperattenuating lesion along the anterior cerebral falx correspond to lesions seen on MRI.   She was referred to Dr. Ken for oncologic work up. CT of neck chest abdomen and pelvis were negative for malignancy in February 2016. Patient had repeat brain and and calvarium imaging in March of 2016 prior to anticipated biopsy. Lesions appeared unchanged.   Surgery for biopsy was postponed due to patient's uncontrolled blood pressure.   She has been seen by nephrology, Dr. Rosa to address her refractory hypertension as well as her early stage CKD. Serum free light cahin, SPEP and KEVIN drawn 11/10/16. FLC showed mildly elevated kappa = 2.29 with babak ratio. SPEP was normal but KEVIN detected an IgG lambda specific monoclonal protein at the beta/gamma junction. This had been seen on KEVIN 5/16/16 as well.   She reports longstanding history of anemia she attributes to Hgb C trait. She has received two blood transfusions in the distant past. She has a history of heavy menstrual bleeding which required a uterine lining ablation.    Review of Systems   Constitutional: Negative for appetite change and unexpected weight change.   Eyes: Negative for visual disturbance.   Respiratory: Negative for cough and shortness of breath.    Cardiovascular: Negative for chest pain.   Gastrointestinal: Negative for abdominal pain and diarrhea.   Genitourinary: Negative for frequency.   Musculoskeletal: Positive for back  pain.   Skin: Negative for rash.   Neurological: Negative for headaches.   Hematological: Negative for adenopathy.   Psychiatric/Behavioral: The patient is not nervous/anxious.        Objective:      Physical Exam  Vitals signs and nursing note reviewed.   Constitutional:       Appearance: She is well-developed.   HENT:      Head: Normocephalic and atraumatic.   Eyes:      General: No scleral icterus.     Conjunctiva/sclera: Conjunctivae normal.   Neck:      Musculoskeletal: Normal range of motion and neck supple.   Cardiovascular:      Rate and Rhythm: Normal rate.   Pulmonary:      Effort: Pulmonary effort is normal. No respiratory distress.   Abdominal:      General: There is no distension.      Tenderness: There is no abdominal tenderness.   Musculoskeletal: Normal range of motion.   Skin:     Coloration: Skin is not jaundiced.      Findings: No bruising.   Neurological:      Mental Status: She is alert and oriented to person, place, and time.      Cranial Nerves: No cranial nerve deficit.   Psychiatric:         Behavior: Behavior normal.         Assessment:       No diagnosis found.    Plan:   Hgb C trait with delta chain variant  CBC is normal    Return to hematology as needed.

## 2020-11-02 ENCOUNTER — PATIENT MESSAGE (OUTPATIENT)
Dept: ADMINISTRATIVE | Facility: OTHER | Age: 60
End: 2020-11-02

## 2020-11-09 ENCOUNTER — PATIENT OUTREACH (OUTPATIENT)
Dept: OTHER | Facility: OTHER | Age: 60
End: 2020-11-09

## 2020-11-23 ENCOUNTER — TELEPHONE (OUTPATIENT)
Dept: FAMILY MEDICINE | Facility: CLINIC | Age: 60
End: 2020-11-23

## 2020-11-23 ENCOUNTER — PATIENT OUTREACH (OUTPATIENT)
Dept: ADMINISTRATIVE | Facility: HOSPITAL | Age: 60
End: 2020-11-23

## 2020-11-23 NOTE — PROGRESS NOTES
Carlos self reporting blood pressure report - patient in the Digital Hypertension program, her blood pressure taken on 11/23/20 105/70.

## 2020-12-07 ENCOUNTER — TELEPHONE (OUTPATIENT)
Dept: NEPHROLOGY | Facility: CLINIC | Age: 60
End: 2020-12-07

## 2020-12-11 ENCOUNTER — TELEPHONE (OUTPATIENT)
Dept: NEPHROLOGY | Facility: CLINIC | Age: 60
End: 2020-12-11

## 2020-12-18 ENCOUNTER — PATIENT MESSAGE (OUTPATIENT)
Dept: ADMINISTRATIVE | Facility: OTHER | Age: 60
End: 2020-12-18

## 2020-12-21 ENCOUNTER — TELEPHONE (OUTPATIENT)
Dept: NEPHROLOGY | Facility: CLINIC | Age: 60
End: 2020-12-21

## 2020-12-21 DIAGNOSIS — N18.2 STAGE 2 CHRONIC KIDNEY DISEASE: Primary | ICD-10-CM

## 2021-01-15 ENCOUNTER — LAB VISIT (OUTPATIENT)
Dept: LAB | Facility: HOSPITAL | Age: 61
End: 2021-01-15
Attending: INTERNAL MEDICINE
Payer: MEDICARE

## 2021-01-15 DIAGNOSIS — N18.2 STAGE 2 CHRONIC KIDNEY DISEASE: ICD-10-CM

## 2021-01-15 LAB
ANION GAP SERPL CALC-SCNC: 13 MMOL/L (ref 8–16)
BASOPHILS # BLD AUTO: 0.06 K/UL (ref 0–0.2)
BASOPHILS NFR BLD: 1.2 % (ref 0–1.9)
BUN SERPL-MCNC: 13 MG/DL (ref 8–23)
CALCIUM SERPL-MCNC: 9.3 MG/DL (ref 8.7–10.5)
CHLORIDE SERPL-SCNC: 104 MMOL/L (ref 95–110)
CO2 SERPL-SCNC: 25 MMOL/L (ref 23–29)
CREAT SERPL-MCNC: 1.1 MG/DL (ref 0.5–1.4)
DIFFERENTIAL METHOD: ABNORMAL
EOSINOPHIL # BLD AUTO: 0.1 K/UL (ref 0–0.5)
EOSINOPHIL NFR BLD: 2.4 % (ref 0–8)
ERYTHROCYTE [DISTWIDTH] IN BLOOD BY AUTOMATED COUNT: 14.7 % (ref 11.5–14.5)
EST. GFR  (AFRICAN AMERICAN): >60 ML/MIN/1.73 M^2
EST. GFR  (NON AFRICAN AMERICAN): 54.3 ML/MIN/1.73 M^2
GLUCOSE SERPL-MCNC: 103 MG/DL (ref 70–110)
HCT VFR BLD AUTO: 37.9 % (ref 37–48.5)
HGB BLD-MCNC: 12.2 G/DL (ref 12–16)
IMM GRANULOCYTES # BLD AUTO: 0.01 K/UL (ref 0–0.04)
IMM GRANULOCYTES NFR BLD AUTO: 0.2 % (ref 0–0.5)
LYMPHOCYTES # BLD AUTO: 1.8 K/UL (ref 1–4.8)
LYMPHOCYTES NFR BLD: 34.5 % (ref 18–48)
MCH RBC QN AUTO: 26.2 PG (ref 27–31)
MCHC RBC AUTO-ENTMCNC: 32.2 G/DL (ref 32–36)
MCV RBC AUTO: 82 FL (ref 82–98)
MONOCYTES # BLD AUTO: 0.4 K/UL (ref 0.3–1)
MONOCYTES NFR BLD: 7.8 % (ref 4–15)
NEUTROPHILS # BLD AUTO: 2.8 K/UL (ref 1.8–7.7)
NEUTROPHILS NFR BLD: 53.9 % (ref 38–73)
NRBC BLD-RTO: 0 /100 WBC
PLATELET # BLD AUTO: 207 K/UL (ref 150–350)
PMV BLD AUTO: 10.9 FL (ref 9.2–12.9)
POTASSIUM SERPL-SCNC: 3.7 MMOL/L (ref 3.5–5.1)
PTH-INTACT SERPL-MCNC: 135 PG/ML (ref 9–77)
RBC # BLD AUTO: 4.65 M/UL (ref 4–5.4)
SODIUM SERPL-SCNC: 142 MMOL/L (ref 136–145)
WBC # BLD AUTO: 5.1 K/UL (ref 3.9–12.7)

## 2021-01-15 PROCEDURE — 80048 BASIC METABOLIC PNL TOTAL CA: CPT | Mod: HCNC

## 2021-01-15 PROCEDURE — 36415 COLL VENOUS BLD VENIPUNCTURE: CPT | Mod: HCNC,PO

## 2021-01-15 PROCEDURE — 85025 COMPLETE CBC W/AUTO DIFF WBC: CPT | Mod: HCNC

## 2021-01-15 PROCEDURE — 83970 ASSAY OF PARATHORMONE: CPT | Mod: HCNC

## 2021-01-19 ENCOUNTER — PATIENT MESSAGE (OUTPATIENT)
Dept: FAMILY MEDICINE | Facility: CLINIC | Age: 61
End: 2021-01-19

## 2021-02-05 ENCOUNTER — PES CALL (OUTPATIENT)
Dept: ADMINISTRATIVE | Facility: CLINIC | Age: 61
End: 2021-02-05

## 2021-02-17 ENCOUNTER — PATIENT MESSAGE (OUTPATIENT)
Dept: FAMILY MEDICINE | Facility: CLINIC | Age: 61
End: 2021-02-17

## 2021-02-18 ENCOUNTER — PATIENT MESSAGE (OUTPATIENT)
Dept: FAMILY MEDICINE | Facility: CLINIC | Age: 61
End: 2021-02-18

## 2021-02-26 ENCOUNTER — IMMUNIZATION (OUTPATIENT)
Dept: INTERNAL MEDICINE | Facility: CLINIC | Age: 61
End: 2021-02-26
Payer: MEDICARE

## 2021-02-26 DIAGNOSIS — Z23 NEED FOR VACCINATION: Primary | ICD-10-CM

## 2021-02-26 PROCEDURE — 91300 COVID-19, MRNA, LNP-S, PF, 30 MCG/0.3 ML DOSE VACCINE: CPT | Mod: PBBFAC | Performed by: INTERNAL MEDICINE

## 2021-03-19 ENCOUNTER — IMMUNIZATION (OUTPATIENT)
Dept: INTERNAL MEDICINE | Facility: CLINIC | Age: 61
End: 2021-03-19
Payer: MEDICARE

## 2021-03-19 DIAGNOSIS — Z23 NEED FOR VACCINATION: Primary | ICD-10-CM

## 2021-03-19 PROCEDURE — 0002A COVID-19, MRNA, LNP-S, PF, 30 MCG/0.3 ML DOSE VACCINE: CPT | Mod: PBBFAC | Performed by: INTERNAL MEDICINE

## 2021-03-19 PROCEDURE — 91300 COVID-19, MRNA, LNP-S, PF, 30 MCG/0.3 ML DOSE VACCINE: CPT | Mod: PBBFAC | Performed by: INTERNAL MEDICINE

## 2021-03-25 ENCOUNTER — CLINICAL SUPPORT (OUTPATIENT)
Dept: URGENT CARE | Facility: CLINIC | Age: 61
End: 2021-03-25
Payer: MEDICARE

## 2021-03-25 DIAGNOSIS — Z11.9 SPECIAL SCREENING EXAMINATION FOR INFECTIOUS DISEASES: Primary | ICD-10-CM

## 2021-03-25 LAB
CTP QC/QA: YES
SARS-COV-2 RDRP RESP QL NAA+PROBE: NEGATIVE

## 2021-03-25 PROCEDURE — U0002 COVID-19 LAB TEST NON-CDC: HCPCS | Mod: QW,S$GLB,, | Performed by: PHYSICIAN ASSISTANT

## 2021-03-25 PROCEDURE — U0002: ICD-10-PCS | Mod: QW,S$GLB,, | Performed by: PHYSICIAN ASSISTANT

## 2021-04-06 ENCOUNTER — OFFICE VISIT (OUTPATIENT)
Dept: OTOLARYNGOLOGY | Facility: CLINIC | Age: 61
End: 2021-04-06
Payer: MEDICARE

## 2021-04-06 VITALS — BODY MASS INDEX: 31.9 KG/M2 | WEIGHT: 185.88 LBS

## 2021-04-06 DIAGNOSIS — H90.3 SENSORINEURAL HEARING LOSS, BILATERAL: Primary | ICD-10-CM

## 2021-04-06 DIAGNOSIS — H91.93 PROFOUND BILATERAL HEARING LOSS: ICD-10-CM

## 2021-04-06 PROCEDURE — 99999 PR PBB SHADOW E&M-EST. PATIENT-LVL III: CPT | Mod: PBBFAC,,, | Performed by: OTOLARYNGOLOGY

## 2021-04-06 PROCEDURE — 1126F AMNT PAIN NOTED NONE PRSNT: CPT | Mod: S$GLB,,, | Performed by: OTOLARYNGOLOGY

## 2021-04-06 PROCEDURE — 99999 PR PBB SHADOW E&M-EST. PATIENT-LVL III: ICD-10-PCS | Mod: PBBFAC,,, | Performed by: OTOLARYNGOLOGY

## 2021-04-06 PROCEDURE — 3008F BODY MASS INDEX DOCD: CPT | Mod: CPTII,S$GLB,, | Performed by: OTOLARYNGOLOGY

## 2021-04-06 PROCEDURE — 99213 PR OFFICE/OUTPT VISIT, EST, LEVL III, 20-29 MIN: ICD-10-PCS | Mod: S$GLB,,, | Performed by: OTOLARYNGOLOGY

## 2021-04-06 PROCEDURE — 99213 OFFICE O/P EST LOW 20 MIN: CPT | Mod: S$GLB,,, | Performed by: OTOLARYNGOLOGY

## 2021-04-06 PROCEDURE — 3008F PR BODY MASS INDEX (BMI) DOCUMENTED: ICD-10-PCS | Mod: CPTII,S$GLB,, | Performed by: OTOLARYNGOLOGY

## 2021-04-06 PROCEDURE — 1126F PR PAIN SEVERITY QUANTIFIED, NO PAIN PRESENT: ICD-10-PCS | Mod: S$GLB,,, | Performed by: OTOLARYNGOLOGY

## 2021-05-03 ENCOUNTER — PATIENT MESSAGE (OUTPATIENT)
Dept: ADMINISTRATIVE | Facility: OTHER | Age: 61
End: 2021-05-03

## 2021-07-13 DIAGNOSIS — Z12.31 BREAST CANCER SCREENING BY MAMMOGRAM: Primary | ICD-10-CM

## 2021-07-20 ENCOUNTER — HOSPITAL ENCOUNTER (OUTPATIENT)
Dept: RADIOLOGY | Facility: HOSPITAL | Age: 61
Discharge: HOME | End: 2021-07-20
Payer: MEDICARE

## 2021-07-20 DIAGNOSIS — Z12.31 BREAST CANCER SCREENING BY MAMMOGRAM: ICD-10-CM

## 2021-07-20 PROCEDURE — 77063 BREAST TOMOSYNTHESIS BI: CPT | Mod: 26,,, | Performed by: RADIOLOGY

## 2021-07-20 PROCEDURE — 77067 SCR MAMMO BI INCL CAD: CPT | Mod: 26,,, | Performed by: RADIOLOGY

## 2021-07-20 PROCEDURE — 77063 MAMMO DIGITAL SCREENING BILAT WITH TOMO: ICD-10-PCS | Mod: 26,,, | Performed by: RADIOLOGY

## 2021-07-20 PROCEDURE — 77067 SCR MAMMO BI INCL CAD: CPT | Mod: TC,PO

## 2021-07-20 PROCEDURE — 77067 MAMMO DIGITAL SCREENING BILAT WITH TOMO: ICD-10-PCS | Mod: 26,,, | Performed by: RADIOLOGY

## 2021-08-02 ENCOUNTER — OFFICE VISIT (OUTPATIENT)
Dept: OPTOMETRY | Facility: CLINIC | Age: 61
End: 2021-08-02
Payer: MEDICARE

## 2021-08-02 DIAGNOSIS — H25.13 NUCLEAR SCLEROSIS OF BOTH EYES: Primary | ICD-10-CM

## 2021-08-02 DIAGNOSIS — H52.7 REFRACTIVE ERROR: ICD-10-CM

## 2021-08-02 PROCEDURE — 99999 PR PBB SHADOW E&M-EST. PATIENT-LVL II: CPT | Mod: PBBFAC,,, | Performed by: OPTOMETRIST

## 2021-08-02 PROCEDURE — 1159F PR MEDICATION LIST DOCUMENTED IN MEDICAL RECORD: ICD-10-PCS | Mod: CPTII,S$GLB,, | Performed by: OPTOMETRIST

## 2021-08-02 PROCEDURE — 92014 PR EYE EXAM, EST PATIENT,COMPREHESV: ICD-10-PCS | Mod: S$GLB,,, | Performed by: OPTOMETRIST

## 2021-08-02 PROCEDURE — 92015 DETERMINE REFRACTIVE STATE: CPT | Mod: S$GLB,,, | Performed by: OPTOMETRIST

## 2021-08-02 PROCEDURE — 1160F RVW MEDS BY RX/DR IN RCRD: CPT | Mod: CPTII,S$GLB,, | Performed by: OPTOMETRIST

## 2021-08-02 PROCEDURE — 1126F AMNT PAIN NOTED NONE PRSNT: CPT | Mod: CPTII,S$GLB,, | Performed by: OPTOMETRIST

## 2021-08-02 PROCEDURE — 1159F MED LIST DOCD IN RCRD: CPT | Mod: CPTII,S$GLB,, | Performed by: OPTOMETRIST

## 2021-08-02 PROCEDURE — 1160F PR REVIEW ALL MEDS BY PRESCRIBER/CLIN PHARMACIST DOCUMENTED: ICD-10-PCS | Mod: CPTII,S$GLB,, | Performed by: OPTOMETRIST

## 2021-08-02 PROCEDURE — 1126F PR PAIN SEVERITY QUANTIFIED, NO PAIN PRESENT: ICD-10-PCS | Mod: CPTII,S$GLB,, | Performed by: OPTOMETRIST

## 2021-08-02 PROCEDURE — 99999 PR PBB SHADOW E&M-EST. PATIENT-LVL II: ICD-10-PCS | Mod: PBBFAC,,, | Performed by: OPTOMETRIST

## 2021-08-02 PROCEDURE — 92014 COMPRE OPH EXAM EST PT 1/>: CPT | Mod: S$GLB,,, | Performed by: OPTOMETRIST

## 2021-08-02 PROCEDURE — 92015 PR REFRACTION: ICD-10-PCS | Mod: S$GLB,,, | Performed by: OPTOMETRIST

## 2021-09-17 ENCOUNTER — PES CALL (OUTPATIENT)
Dept: ADMINISTRATIVE | Facility: CLINIC | Age: 61
End: 2021-09-17

## 2021-11-04 ENCOUNTER — PATIENT MESSAGE (OUTPATIENT)
Dept: ADMINISTRATIVE | Facility: OTHER | Age: 61
End: 2021-11-04
Payer: MEDICARE

## 2021-11-15 ENCOUNTER — OFFICE VISIT (OUTPATIENT)
Dept: HEMATOLOGY/ONCOLOGY | Facility: CLINIC | Age: 61
End: 2021-11-15
Payer: MEDICARE

## 2021-11-15 ENCOUNTER — LAB VISIT (OUTPATIENT)
Dept: LAB | Facility: HOSPITAL | Age: 61
End: 2021-11-15
Payer: MEDICARE

## 2021-11-15 VITALS
DIASTOLIC BLOOD PRESSURE: 115 MMHG | HEART RATE: 92 BPM | WEIGHT: 186.94 LBS | SYSTOLIC BLOOD PRESSURE: 215 MMHG | BODY MASS INDEX: 32.09 KG/M2 | OXYGEN SATURATION: 99 % | RESPIRATION RATE: 16 BRPM

## 2021-11-15 DIAGNOSIS — N18.31 CHRONIC KIDNEY DISEASE, STAGE 3A: ICD-10-CM

## 2021-11-15 DIAGNOSIS — D58.2 HEMOGLOBIN C TRAIT: ICD-10-CM

## 2021-11-15 DIAGNOSIS — I77.1 TORTUOUS AORTA: ICD-10-CM

## 2021-11-15 LAB
BASOPHILS # BLD AUTO: 0.06 K/UL (ref 0–0.2)
BASOPHILS NFR BLD: 1 % (ref 0–1.9)
DIFFERENTIAL METHOD: ABNORMAL
EOSINOPHIL # BLD AUTO: 0.1 K/UL (ref 0–0.5)
EOSINOPHIL NFR BLD: 1.7 % (ref 0–8)
ERYTHROCYTE [DISTWIDTH] IN BLOOD BY AUTOMATED COUNT: 14.5 % (ref 11.5–14.5)
HCT VFR BLD AUTO: 34.9 % (ref 37–48.5)
HGB BLD-MCNC: 12 G/DL (ref 12–16)
IMM GRANULOCYTES # BLD AUTO: 0.01 K/UL (ref 0–0.04)
IMM GRANULOCYTES NFR BLD AUTO: 0.2 % (ref 0–0.5)
LYMPHOCYTES # BLD AUTO: 2.3 K/UL (ref 1–4.8)
LYMPHOCYTES NFR BLD: 39.8 % (ref 18–48)
MCH RBC QN AUTO: 27.2 PG (ref 27–31)
MCHC RBC AUTO-ENTMCNC: 34.4 G/DL (ref 32–36)
MCV RBC AUTO: 79 FL (ref 82–98)
MONOCYTES # BLD AUTO: 0.5 K/UL (ref 0.3–1)
MONOCYTES NFR BLD: 8.5 % (ref 4–15)
NEUTROPHILS # BLD AUTO: 2.9 K/UL (ref 1.8–7.7)
NEUTROPHILS NFR BLD: 48.8 % (ref 38–73)
NRBC BLD-RTO: 0 /100 WBC
PLATELET # BLD AUTO: 242 K/UL (ref 150–450)
PMV BLD AUTO: 10.1 FL (ref 9.2–12.9)
RBC # BLD AUTO: 4.41 M/UL (ref 4–5.4)
WBC # BLD AUTO: 5.85 K/UL (ref 3.9–12.7)

## 2021-11-15 PROCEDURE — 99499 UNLISTED E&M SERVICE: CPT | Mod: HCNC,S$GLB,, | Performed by: INTERNAL MEDICINE

## 2021-11-15 PROCEDURE — 99999 PR PBB SHADOW E&M-EST. PATIENT-LVL III: ICD-10-PCS | Mod: PBBFAC,HCNC,, | Performed by: INTERNAL MEDICINE

## 2021-11-15 PROCEDURE — 3008F PR BODY MASS INDEX (BMI) DOCUMENTED: ICD-10-PCS | Mod: HCNC,CPTII,S$GLB, | Performed by: INTERNAL MEDICINE

## 2021-11-15 PROCEDURE — 3008F BODY MASS INDEX DOCD: CPT | Mod: HCNC,CPTII,S$GLB, | Performed by: INTERNAL MEDICINE

## 2021-11-15 PROCEDURE — 1159F MED LIST DOCD IN RCRD: CPT | Mod: HCNC,CPTII,S$GLB, | Performed by: INTERNAL MEDICINE

## 2021-11-15 PROCEDURE — 3077F SYST BP >= 140 MM HG: CPT | Mod: HCNC,CPTII,S$GLB, | Performed by: INTERNAL MEDICINE

## 2021-11-15 PROCEDURE — 99999 PR PBB SHADOW E&M-EST. PATIENT-LVL III: CPT | Mod: PBBFAC,HCNC,, | Performed by: INTERNAL MEDICINE

## 2021-11-15 PROCEDURE — 1159F PR MEDICATION LIST DOCUMENTED IN MEDICAL RECORD: ICD-10-PCS | Mod: HCNC,CPTII,S$GLB, | Performed by: INTERNAL MEDICINE

## 2021-11-15 PROCEDURE — 85025 COMPLETE CBC W/AUTO DIFF WBC: CPT | Mod: HCNC | Performed by: INTERNAL MEDICINE

## 2021-11-15 PROCEDURE — 3080F DIAST BP >= 90 MM HG: CPT | Mod: HCNC,CPTII,S$GLB, | Performed by: INTERNAL MEDICINE

## 2021-11-15 PROCEDURE — 3077F PR MOST RECENT SYSTOLIC BLOOD PRESSURE >= 140 MM HG: ICD-10-PCS | Mod: HCNC,CPTII,S$GLB, | Performed by: INTERNAL MEDICINE

## 2021-11-15 PROCEDURE — 99499 RISK ADDL DX/OHS AUDIT: ICD-10-PCS | Mod: HCNC,S$GLB,, | Performed by: INTERNAL MEDICINE

## 2021-11-15 PROCEDURE — 99215 OFFICE O/P EST HI 40 MIN: CPT | Mod: HCNC,S$GLB,, | Performed by: INTERNAL MEDICINE

## 2021-11-15 PROCEDURE — 99215 PR OFFICE/OUTPT VISIT, EST, LEVL V, 40-54 MIN: ICD-10-PCS | Mod: HCNC,S$GLB,, | Performed by: INTERNAL MEDICINE

## 2021-11-15 PROCEDURE — 3080F PR MOST RECENT DIASTOLIC BLOOD PRESSURE >= 90 MM HG: ICD-10-PCS | Mod: HCNC,CPTII,S$GLB, | Performed by: INTERNAL MEDICINE

## 2021-11-15 PROCEDURE — 36415 COLL VENOUS BLD VENIPUNCTURE: CPT | Mod: HCNC | Performed by: INTERNAL MEDICINE

## 2021-11-17 ENCOUNTER — PATIENT MESSAGE (OUTPATIENT)
Dept: NEPHROLOGY | Facility: CLINIC | Age: 61
End: 2021-11-17
Payer: MEDICARE

## 2021-11-17 ENCOUNTER — TELEPHONE (OUTPATIENT)
Dept: NEPHROLOGY | Facility: CLINIC | Age: 61
End: 2021-11-17
Payer: MEDICARE

## 2021-11-17 DIAGNOSIS — N18.2 CKD (CHRONIC KIDNEY DISEASE), STAGE II: Primary | ICD-10-CM

## 2021-12-03 ENCOUNTER — TELEPHONE (OUTPATIENT)
Dept: NEPHROLOGY | Facility: CLINIC | Age: 61
End: 2021-12-03
Payer: MEDICARE

## 2021-12-09 ENCOUNTER — TELEPHONE (OUTPATIENT)
Dept: OBSTETRICS AND GYNECOLOGY | Facility: CLINIC | Age: 61
End: 2021-12-09
Payer: MEDICARE

## 2021-12-09 ENCOUNTER — IMMUNIZATION (OUTPATIENT)
Dept: INTERNAL MEDICINE | Facility: CLINIC | Age: 61
End: 2021-12-09
Payer: MEDICARE

## 2021-12-09 DIAGNOSIS — Z23 NEED FOR VACCINATION: Primary | ICD-10-CM

## 2021-12-09 PROCEDURE — 0004A COVID-19, MRNA, LNP-S, PF, 30 MCG/0.3 ML DOSE VACCINE: CPT | Mod: HCNC,CV19,PBBFAC | Performed by: INTERNAL MEDICINE

## 2021-12-20 ENCOUNTER — PATIENT MESSAGE (OUTPATIENT)
Dept: ADMINISTRATIVE | Facility: OTHER | Age: 61
End: 2021-12-20
Payer: MEDICARE

## 2022-01-03 DIAGNOSIS — N28.1 RENAL CYST: Primary | ICD-10-CM

## 2022-01-05 ENCOUNTER — TELEPHONE (OUTPATIENT)
Dept: FAMILY MEDICINE | Facility: CLINIC | Age: 62
End: 2022-01-05
Payer: MEDICARE

## 2022-01-06 ENCOUNTER — LAB VISIT (OUTPATIENT)
Dept: LAB | Facility: HOSPITAL | Age: 62
End: 2022-01-06
Attending: INTERNAL MEDICINE
Payer: MEDICARE

## 2022-01-06 DIAGNOSIS — N18.2 CKD (CHRONIC KIDNEY DISEASE), STAGE II: ICD-10-CM

## 2022-01-06 LAB
ANION GAP SERPL CALC-SCNC: 8 MMOL/L (ref 8–16)
BASOPHILS # BLD AUTO: 0.05 K/UL (ref 0–0.2)
BASOPHILS NFR BLD: 1.1 % (ref 0–1.9)
BUN SERPL-MCNC: 13 MG/DL (ref 8–23)
CALCIUM SERPL-MCNC: 9.4 MG/DL (ref 8.7–10.5)
CHLORIDE SERPL-SCNC: 104 MMOL/L (ref 95–110)
CO2 SERPL-SCNC: 25 MMOL/L (ref 23–29)
CREAT SERPL-MCNC: 1.1 MG/DL (ref 0.5–1.4)
DIFFERENTIAL METHOD: ABNORMAL
EOSINOPHIL # BLD AUTO: 0.1 K/UL (ref 0–0.5)
EOSINOPHIL NFR BLD: 1.8 % (ref 0–8)
ERYTHROCYTE [DISTWIDTH] IN BLOOD BY AUTOMATED COUNT: 14.4 % (ref 11.5–14.5)
EST. GFR  (AFRICAN AMERICAN): >60 ML/MIN/1.73 M^2
EST. GFR  (NON AFRICAN AMERICAN): 53.9 ML/MIN/1.73 M^2
GLUCOSE SERPL-MCNC: 87 MG/DL (ref 70–110)
HCT VFR BLD AUTO: 35.9 % (ref 37–48.5)
HGB BLD-MCNC: 12.1 G/DL (ref 12–16)
IMM GRANULOCYTES # BLD AUTO: 0.01 K/UL (ref 0–0.04)
IMM GRANULOCYTES NFR BLD AUTO: 0.2 % (ref 0–0.5)
LYMPHOCYTES # BLD AUTO: 1.6 K/UL (ref 1–4.8)
LYMPHOCYTES NFR BLD: 34.8 % (ref 18–48)
MCH RBC QN AUTO: 26.6 PG (ref 27–31)
MCHC RBC AUTO-ENTMCNC: 33.7 G/DL (ref 32–36)
MCV RBC AUTO: 79 FL (ref 82–98)
MONOCYTES # BLD AUTO: 0.4 K/UL (ref 0.3–1)
MONOCYTES NFR BLD: 8.3 % (ref 4–15)
NEUTROPHILS # BLD AUTO: 2.4 K/UL (ref 1.8–7.7)
NEUTROPHILS NFR BLD: 53.8 % (ref 38–73)
NRBC BLD-RTO: 0 /100 WBC
PLATELET # BLD AUTO: 160 K/UL (ref 150–450)
PMV BLD AUTO: 11.9 FL (ref 9.2–12.9)
POTASSIUM SERPL-SCNC: 3.3 MMOL/L (ref 3.5–5.1)
RBC # BLD AUTO: 4.55 M/UL (ref 4–5.4)
SODIUM SERPL-SCNC: 137 MMOL/L (ref 136–145)
WBC # BLD AUTO: 4.46 K/UL (ref 3.9–12.7)

## 2022-01-06 PROCEDURE — 85025 COMPLETE CBC W/AUTO DIFF WBC: CPT | Mod: HCNC | Performed by: NURSE PRACTITIONER

## 2022-01-06 PROCEDURE — 80048 BASIC METABOLIC PNL TOTAL CA: CPT | Mod: HCNC | Performed by: NURSE PRACTITIONER

## 2022-01-06 PROCEDURE — 36415 COLL VENOUS BLD VENIPUNCTURE: CPT | Mod: HCNC,PO | Performed by: NURSE PRACTITIONER

## 2022-01-07 ENCOUNTER — LAB VISIT (OUTPATIENT)
Dept: LAB | Facility: HOSPITAL | Age: 62
End: 2022-01-07
Payer: MEDICARE

## 2022-01-07 ENCOUNTER — OFFICE VISIT (OUTPATIENT)
Dept: NEPHROLOGY | Facility: CLINIC | Age: 62
End: 2022-01-07
Payer: MEDICARE

## 2022-01-07 VITALS
BODY MASS INDEX: 32.39 KG/M2 | DIASTOLIC BLOOD PRESSURE: 81 MMHG | SYSTOLIC BLOOD PRESSURE: 160 MMHG | WEIGHT: 188.69 LBS | HEART RATE: 98 BPM | OXYGEN SATURATION: 100 %

## 2022-01-07 DIAGNOSIS — N18.31 CHRONIC KIDNEY DISEASE, STAGE 3A: Primary | ICD-10-CM

## 2022-01-07 DIAGNOSIS — N18.31 CHRONIC KIDNEY DISEASE, STAGE 3A: ICD-10-CM

## 2022-01-07 DIAGNOSIS — E55.9 VITAMIN D DEFICIENCY: ICD-10-CM

## 2022-01-07 DIAGNOSIS — I10 WHITE COAT SYNDROME WITH DIAGNOSIS OF HYPERTENSION: Chronic | ICD-10-CM

## 2022-01-07 PROCEDURE — 99999 PR PBB SHADOW E&M-EST. PATIENT-LVL III: ICD-10-PCS | Mod: PBBFAC,HCNC,, | Performed by: INTERNAL MEDICINE

## 2022-01-07 PROCEDURE — 1159F MED LIST DOCD IN RCRD: CPT | Mod: HCNC,CPTII,S$GLB, | Performed by: INTERNAL MEDICINE

## 2022-01-07 PROCEDURE — 99214 PR OFFICE/OUTPT VISIT, EST, LEVL IV, 30-39 MIN: ICD-10-PCS | Mod: HCNC,S$GLB,, | Performed by: INTERNAL MEDICINE

## 2022-01-07 PROCEDURE — 3066F PR DOCUMENTATION OF TREATMENT FOR NEPHROPATHY: ICD-10-PCS | Mod: HCNC,CPTII,S$GLB, | Performed by: INTERNAL MEDICINE

## 2022-01-07 PROCEDURE — 3008F BODY MASS INDEX DOCD: CPT | Mod: HCNC,CPTII,S$GLB, | Performed by: INTERNAL MEDICINE

## 2022-01-07 PROCEDURE — 3077F PR MOST RECENT SYSTOLIC BLOOD PRESSURE >= 140 MM HG: ICD-10-PCS | Mod: HCNC,CPTII,S$GLB, | Performed by: INTERNAL MEDICINE

## 2022-01-07 PROCEDURE — 3079F DIAST BP 80-89 MM HG: CPT | Mod: HCNC,CPTII,S$GLB, | Performed by: INTERNAL MEDICINE

## 2022-01-07 PROCEDURE — 1159F PR MEDICATION LIST DOCUMENTED IN MEDICAL RECORD: ICD-10-PCS | Mod: HCNC,CPTII,S$GLB, | Performed by: INTERNAL MEDICINE

## 2022-01-07 PROCEDURE — 3077F SYST BP >= 140 MM HG: CPT | Mod: HCNC,CPTII,S$GLB, | Performed by: INTERNAL MEDICINE

## 2022-01-07 PROCEDURE — 3008F PR BODY MASS INDEX (BMI) DOCUMENTED: ICD-10-PCS | Mod: HCNC,CPTII,S$GLB, | Performed by: INTERNAL MEDICINE

## 2022-01-07 PROCEDURE — 3079F PR MOST RECENT DIASTOLIC BLOOD PRESSURE 80-89 MM HG: ICD-10-PCS | Mod: HCNC,CPTII,S$GLB, | Performed by: INTERNAL MEDICINE

## 2022-01-07 PROCEDURE — 99999 PR PBB SHADOW E&M-EST. PATIENT-LVL III: CPT | Mod: PBBFAC,HCNC,, | Performed by: INTERNAL MEDICINE

## 2022-01-07 PROCEDURE — 36415 COLL VENOUS BLD VENIPUNCTURE: CPT | Mod: HCNC | Performed by: INTERNAL MEDICINE

## 2022-01-07 PROCEDURE — 84244 ASSAY OF RENIN: CPT | Mod: HCNC | Performed by: INTERNAL MEDICINE

## 2022-01-07 PROCEDURE — 99214 OFFICE O/P EST MOD 30 MIN: CPT | Mod: HCNC,S$GLB,, | Performed by: INTERNAL MEDICINE

## 2022-01-07 PROCEDURE — 3066F NEPHROPATHY DOC TX: CPT | Mod: HCNC,CPTII,S$GLB, | Performed by: INTERNAL MEDICINE

## 2022-01-07 NOTE — PROGRESS NOTES
Subjective:       Patient ID: Omid Rascon is a 61 y.o. Black or  female who presents for a follow up evaluation of HTN  The patient has a history of HTN possibly 2013, depression since lower back injury. She was diagnosed with a frontal skull mass and in that context it was found that she has difficult to control BP. She was also found to have MGUS which has resolved as did the skull mass. Hgb C trait with delta chain variant. She is followed by hem/onc. She is She states to use a low salt diet. She is not on any BP meds anymore, instead taking Meloxicam for pain as needed (not very often, last 2 yrs ago). Hearing is impaired and it is very difficult to communicate with her.     The patient tells me that her blood pressure at home runs in the 130's systolic.     HPI  Review of Systems   Constitutional: Negative for activity change, appetite change, chills, fatigue, fever and unexpected weight change.   HENT: Positive for hearing loss. Negative for nosebleeds.    Eyes: Negative.    Respiratory: Negative.  Negative for cough, chest tightness and shortness of breath.    Cardiovascular: Negative.  Negative for chest pain and leg swelling.   Gastrointestinal: Negative for abdominal pain, anal bleeding, diarrhea, nausea and vomiting.   Genitourinary: Negative for decreased urine volume, difficulty urinating, dysuria, flank pain, frequency, hematuria, pelvic pain, urgency and vaginal bleeding.   Musculoskeletal: Positive for back pain. Negative for joint swelling and myalgias.   Skin: Negative.  Negative for rash.   Neurological: Negative.    Psychiatric/Behavioral: Negative.    All other systems reviewed and are negative.      Objective:      Physical Exam  Vitals and nursing note reviewed.   Constitutional:       General: She is not in acute distress.     Appearance: Normal appearance. She is well-developed. She is not diaphoretic.   HENT:      Head: Normocephalic and atraumatic.      Right Ear:  External ear normal.      Left Ear: External ear normal.      Nose: Nose normal.   Eyes:      Conjunctiva/sclera: Conjunctivae normal.      Pupils: Pupils are equal, round, and reactive to light.   Neck:      Thyroid: No thyromegaly.   Cardiovascular:      Rate and Rhythm: Normal rate and regular rhythm.      Heart sounds: Murmur:         Pulmonary:      Effort: Pulmonary effort is normal. No respiratory distress.      Breath sounds: Normal breath sounds. No wheezing or rales.   Chest:      Chest wall: No tenderness.   Abdominal:      General: Bowel sounds are normal. There is no distension.      Palpations: Abdomen is soft.      Tenderness: There is no abdominal tenderness. There is no guarding or rebound.   Musculoskeletal:         General: No tenderness. Normal range of motion.      Cervical back: Normal range of motion and neck supple.   Skin:     General: Skin is warm and dry.   Neurological:      Mental Status: She is alert and oriented to person, place, and time.      Deep Tendon Reflexes: Reflexes are normal and symmetric.   Psychiatric:         Behavior: Behavior normal.         Thought Content: Thought content normal.         Judgment: Judgment normal.         Assessment:       1. Chronic kidney disease, stage 3a    2. White coat syndrome with diagnosis of hypertension    3. Vitamin D deficiency        Plan:       1. CKD2-3: creatinine 1.1 mg/dl.    Her renal ultrasound is not normal as it demonstrated signs of chronic kidney disease and a right sided cyst with septation.   In her routine work up a faint IgG lambda specific monoclonal band was present and she is followed by hematology - seemed to have dissapeared in her last KEVIN.    - repeat renal US for her minimally complex cyst now     She has a low degree of hypokalemia, possibly high aldosterone state     - will repeat refugio/renin    Lab Results   Component Value Date    CREATININE 1.1 01/06/2022     Protein Creatinine Ratios:    Prot/Creat Ratio,  Urine   Date Value Ref Range Status   01/06/2022 0.44 (H) 0.00 - 0.20 Final   01/15/2021 0.31 (H) 0.00 - 0.20 Final   07/27/2020 0.26 (H) 0.00 - 0.20 Final     ·   ·   Acid-Base:   Lab Results   Component Value Date     01/06/2022    K 3.3 (L) 01/06/2022    CO2 25 01/06/2022     2. HTN: The patient was found to have white coat hypertension or stress induced HTN with controlled Blood pressures at home (she has ambulatory blood pressure monitoring with Mrs Mcclelland).     Continue relaxation techniques and follow up with primary care and ambulatory blood pressure measuring.  - she again reports normal blood pressure readings when she measures it at home with systolic < 120 mmHG.    She does not want any more blood pressure medication.       3. Vitamin D deficiency -take over the counter.      multiple adverse reactions to medication  Amlodipine/HCTZ - swelling and rash??  Lisinopril and Benicar - rah all over the body  Spironolactone - lost her voice         The patient was educated in practicing a low salt diet.  Avoid high salt foods (olives, pickles, smoked meats, salted potato chips, etc.).   Do not add salt to your food at the table.   Use only small amounts of salt when cooking.        RTC in 12 month with nurse practitioner  renal US now   Vincenzo/renin ratio today

## 2022-01-11 ENCOUNTER — TELEPHONE (OUTPATIENT)
Dept: FAMILY MEDICINE | Facility: CLINIC | Age: 62
End: 2022-01-11
Payer: MEDICARE

## 2022-01-11 NOTE — TELEPHONE ENCOUNTER
If the pt is only willing to see me & has declined other providers such as Lorri,, I would recommend scheduling the next available appt & put her on the waiting list for a sooner appt with me.     Thank you,  Rey

## 2022-01-11 NOTE — TELEPHONE ENCOUNTER
----- Message from Yasmine Soto sent at 1/11/2022 10:13 AM CST -----  Regarding: call back to reschedule cancelled appt on 1/14  Type: Patient Call Back    Who called:Omid     What is the request in detail: the patient is calling to reschedule her appt on 1/14 that was cancelled, with Dr. Hendrickson. The patient would like to move it to another day in January, anytime starting at 10am. The patient was offered the next appt in March but declined. She does not want to see any other provider but Dr. Hendrickson. Patient also requested the staff to respond via [employer] because she is hearing impaired.     Can the clinic reply by [facility]?yes, please respond on [employer]     Would the patient rather a call back or a response via [facility]? Call back    Best call back number:[phone]

## 2022-01-11 NOTE — TELEPHONE ENCOUNTER
Pt is wanting to reschedule her upcoming visit on 01/14 and only wants to see you. Pt is also requesting that I send her a message via [employer] but I wanted to speak with you first regarding message below before I reply to the pt. The next available appt that you have is not until 03/21/22 but she would like something before then. Can you please recommend what you would like for her to do going forward.

## 2022-01-14 LAB
ALDOST SERPL-MCNC: 18.9 NG/DL
ALDOST/RENIN PLAS-RTO: 94.5 RATIO
RENIN PLAS-CCNC: 0.2 NG/ML/HR

## 2022-01-20 DIAGNOSIS — E26.9 HYPERALDOSTERONISM, UNSPECIFIED: ICD-10-CM

## 2022-01-20 DIAGNOSIS — E26.09 PRIMARY HYPERALDOSTERONISM: Primary | ICD-10-CM

## 2022-01-28 ENCOUNTER — PATIENT OUTREACH (OUTPATIENT)
Dept: ADMINISTRATIVE | Facility: HOSPITAL | Age: 62
End: 2022-01-28
Payer: MEDICARE

## 2022-01-28 RX ORDER — CITALOPRAM 10 MG/1
TABLET ORAL
COMMUNITY
Start: 2021-08-09 | End: 2022-06-28

## 2022-01-28 NOTE — PROGRESS NOTES
[facility] Colrectal Screening gap report - no colonoscopy, FitKit, or Cologuard results found. Left message for patient to call our office.    Overdue Pap Smear - Left message for patient to call our office.

## 2022-01-28 NOTE — PROGRESS NOTES
Spoke w/ patient, she has scheduled an appointment with Ob/Gyn and she will schedule an appointment w/ Dr. Hendrickson.

## 2022-02-03 ENCOUNTER — TELEPHONE (OUTPATIENT)
Dept: NEPHROLOGY | Facility: CLINIC | Age: 62
End: 2022-02-03
Payer: MEDICARE

## 2022-02-03 NOTE — TELEPHONE ENCOUNTER
Please call the patient and schedule 24 hour urine for refugio, crea, sodium and also a non-contrast CT of her abdomen. Please let her know that she has an elevated hormone (refugio) which can cause high BP and that we will have to do this studies for it.    Routing comment

## 2022-02-11 ENCOUNTER — HOSPITAL ENCOUNTER (OUTPATIENT)
Dept: RADIOLOGY | Facility: HOSPITAL | Age: 62
Discharge: HOME | End: 2022-02-11
Attending: INTERNAL MEDICINE
Payer: MEDICARE

## 2022-02-11 DIAGNOSIS — E26.9 HYPERALDOSTERONISM, UNSPECIFIED: ICD-10-CM

## 2022-02-11 PROCEDURE — 74176 CT ABD & PELVIS W/O CONTRAST: CPT | Mod: 26,HCNC,, | Performed by: RADIOLOGY

## 2022-02-11 PROCEDURE — 74176 CT ABD & PELVIS W/O CONTRAST: CPT | Mod: TC,HCNC

## 2022-02-11 PROCEDURE — 74176 CT ABDOMEN PELVIS WITHOUT CONTRAST: ICD-10-PCS | Mod: 26,HCNC,, | Performed by: RADIOLOGY

## 2022-02-12 ENCOUNTER — HOSPITAL ENCOUNTER (OUTPATIENT)
Dept: RADIOLOGY | Facility: HOSPITAL | Age: 62
Discharge: HOME | End: 2022-02-12
Attending: INTERNAL MEDICINE
Payer: MEDICARE

## 2022-02-12 DIAGNOSIS — I10 WHITE COAT SYNDROME WITH DIAGNOSIS OF HYPERTENSION: ICD-10-CM

## 2022-02-12 DIAGNOSIS — N18.31 CHRONIC KIDNEY DISEASE, STAGE 3A: ICD-10-CM

## 2022-02-12 PROCEDURE — 76770 US EXAM ABDO BACK WALL COMP: CPT | Mod: TC,HCNC

## 2022-02-12 PROCEDURE — 76770 US RETROPERITONEAL COMPLETE: ICD-10-PCS | Mod: 26,HCNC,, | Performed by: RADIOLOGY

## 2022-02-12 PROCEDURE — 76770 US EXAM ABDO BACK WALL COMP: CPT | Mod: 26,HCNC,, | Performed by: RADIOLOGY

## 2022-02-16 ENCOUNTER — PATIENT MESSAGE (OUTPATIENT)
Dept: NEPHROLOGY | Facility: CLINIC | Age: 62
End: 2022-02-16
Payer: MEDICARE

## 2022-02-21 ENCOUNTER — PATIENT MESSAGE (OUTPATIENT)
Dept: NEPHROLOGY | Facility: CLINIC | Age: 62
End: 2022-02-21
Payer: MEDICARE

## 2022-04-26 ENCOUNTER — PATIENT MESSAGE (OUTPATIENT)
Dept: ADMINISTRATIVE | Facility: OTHER | Age: 62
End: 2022-04-26
Payer: MEDICARE

## 2022-04-28 ENCOUNTER — OFFICE VISIT (OUTPATIENT)
Dept: OBSTETRICS AND GYNECOLOGY | Facility: CLINIC | Age: 62
End: 2022-04-28
Payer: MEDICARE

## 2022-04-28 VITALS
DIASTOLIC BLOOD PRESSURE: 142 MMHG | BODY MASS INDEX: 31.05 KG/M2 | HEIGHT: 64 IN | WEIGHT: 181.88 LBS | SYSTOLIC BLOOD PRESSURE: 188 MMHG

## 2022-04-28 DIAGNOSIS — N81.4 CYSTOCELE WITH PROLAPSE: ICD-10-CM

## 2022-04-28 DIAGNOSIS — Z12.31 ENCOUNTER FOR SCREENING MAMMOGRAM FOR MALIGNANT NEOPLASM OF BREAST: ICD-10-CM

## 2022-04-28 DIAGNOSIS — G89.29 CHRONIC BILATERAL LOW BACK PAIN WITHOUT SCIATICA: ICD-10-CM

## 2022-04-28 DIAGNOSIS — Z01.419 WELL WOMAN EXAM WITH ROUTINE GYNECOLOGICAL EXAM: Primary | ICD-10-CM

## 2022-04-28 DIAGNOSIS — M54.50 CHRONIC BILATERAL LOW BACK PAIN WITHOUT SCIATICA: ICD-10-CM

## 2022-04-28 PROCEDURE — 3077F PR MOST RECENT SYSTOLIC BLOOD PRESSURE >= 140 MM HG: ICD-10-PCS | Mod: CPTII,S$GLB,, | Performed by: OBSTETRICS & GYNECOLOGY

## 2022-04-28 PROCEDURE — 3008F PR BODY MASS INDEX (BMI) DOCUMENTED: ICD-10-PCS | Mod: CPTII,S$GLB,, | Performed by: OBSTETRICS & GYNECOLOGY

## 2022-04-28 PROCEDURE — G0101 PR CA SCREEN;PELVIC/BREAST EXAM: ICD-10-PCS | Mod: S$GLB,,, | Performed by: OBSTETRICS & GYNECOLOGY

## 2022-04-28 PROCEDURE — 3080F DIAST BP >= 90 MM HG: CPT | Mod: CPTII,S$GLB,, | Performed by: OBSTETRICS & GYNECOLOGY

## 2022-04-28 PROCEDURE — 99999 PR PBB SHADOW E&M-EST. PATIENT-LVL III: ICD-10-PCS | Mod: PBBFAC,,, | Performed by: OBSTETRICS & GYNECOLOGY

## 2022-04-28 PROCEDURE — 99999 PR PBB SHADOW E&M-EST. PATIENT-LVL III: CPT | Mod: PBBFAC,,, | Performed by: OBSTETRICS & GYNECOLOGY

## 2022-04-28 PROCEDURE — 1159F MED LIST DOCD IN RCRD: CPT | Mod: CPTII,S$GLB,, | Performed by: OBSTETRICS & GYNECOLOGY

## 2022-04-28 PROCEDURE — 3080F PR MOST RECENT DIASTOLIC BLOOD PRESSURE >= 90 MM HG: ICD-10-PCS | Mod: CPTII,S$GLB,, | Performed by: OBSTETRICS & GYNECOLOGY

## 2022-04-28 PROCEDURE — G0101 CA SCREEN;PELVIC/BREAST EXAM: HCPCS | Mod: S$GLB,,, | Performed by: OBSTETRICS & GYNECOLOGY

## 2022-04-28 PROCEDURE — 3066F PR DOCUMENTATION OF TREATMENT FOR NEPHROPATHY: ICD-10-PCS | Mod: CPTII,S$GLB,, | Performed by: OBSTETRICS & GYNECOLOGY

## 2022-04-28 PROCEDURE — 3066F NEPHROPATHY DOC TX: CPT | Mod: CPTII,S$GLB,, | Performed by: OBSTETRICS & GYNECOLOGY

## 2022-04-28 PROCEDURE — 3077F SYST BP >= 140 MM HG: CPT | Mod: CPTII,S$GLB,, | Performed by: OBSTETRICS & GYNECOLOGY

## 2022-04-28 PROCEDURE — 1159F PR MEDICATION LIST DOCUMENTED IN MEDICAL RECORD: ICD-10-PCS | Mod: CPTII,S$GLB,, | Performed by: OBSTETRICS & GYNECOLOGY

## 2022-04-28 PROCEDURE — 3008F BODY MASS INDEX DOCD: CPT | Mod: CPTII,S$GLB,, | Performed by: OBSTETRICS & GYNECOLOGY

## 2022-04-28 RX ORDER — MELOXICAM 15 MG/1
15 TABLET ORAL DAILY PRN
Qty: 90 TABLET | Refills: 0 | Status: SHIPPED | OUTPATIENT
Start: 2022-04-28 | End: 2022-05-20

## 2022-04-28 NOTE — PROGRESS NOTES
"History & Physical  Gynecology      SUBJECTIVE:     Chief Complaint: Well Woman and Vaginal Prolapse ("Feels like something wants to fall out of my vagina")       History of Present Illness:  Annual Exam-Postmenopausal  Patient presents for annual exam. The patient has complaints today of a bulge in the vagina that she noticed about 9 days ago after a fall from a small ladder.  Has noticed it since then- reports a ball in the vagina.  She has some pelvic and back pain as well. Patient denies post-menopausal vaginal bleeding. The patient is not currently sexually active. The patient is not taking hormone replacement therapy.  The patient participates in regular exercise: yes.  She does not smoke.     GYN screening history: last pap: approximate date 2018 paphpv and was normal  Mammogram history: 2021  Colonoscopy history: 2018 normal per patient      Review of patient's allergies indicates:   Allergen Reactions    Lactose     Sulfa (sulfonamide antibiotics) Swelling    Latex, natural rubber Rash    Shellfish containing products Rash    Strawberries [strawberry] Rash       Past Medical History:   Diagnosis Date    Anemia     CKD (chronic kidney disease), stage II 2016    Depression     Hemoglobin C trait     Hypertension     Impaired hearing     Lumbago 8/10/2015    Monoclonal gammopathy 11/10/2016    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    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     Past Surgical History:   Procedure Laterality Date    ABLATION      LASER ABLATION OF THE CERVIX       OB History        4    Para   2    Term   2            AB   2    Living           SAB   2    IAB        Ectopic        Multiple        Live Births                   Family History   Problem Relation Age of Onset    Stroke Father     Heart failure Mother     No Known Problems Sister     No Known Problems Brother     No Known Problems Brother     No Known Problems Brother     No Known Problems Sister  "    No Known Problems Sister     No Known Problems Sister     No Known Problems Brother     No Known Problems Sister     Diabetes Sister     No Known Problems Maternal Aunt     No Known Problems Maternal Uncle     No Known Problems Paternal Aunt     No Known Problems Paternal Uncle     No Known Problems Maternal Grandmother     No Known Problems Maternal Grandfather     No Known Problems Paternal Grandmother     No Known Problems Paternal Grandfather     Amblyopia Neg Hx     Blindness Neg Hx     Cancer Neg Hx     Cataracts Neg Hx     Glaucoma Neg Hx     Hypertension Neg Hx     Macular degeneration Neg Hx     Retinal detachment Neg Hx     Strabismus Neg Hx     Thyroid disease Neg Hx     Breast cancer Neg Hx     Colon cancer Neg Hx     Ovarian cancer Neg Hx      Social History     Tobacco Use    Smoking status: Never Smoker    Smokeless tobacco: Never Used   Substance Use Topics    Alcohol use: No    Drug use: No       Current Outpatient Medications   Medication Sig    cetirizine (ZYRTEC) 10 MG tablet Take 1 tablet (10 mg total) by mouth daily as needed for Allergies or Rhinitis.    citalopram (CELEXA) 10 MG tablet     meloxicam (MOBIC) 15 MG tablet Take 1 tablet (15 mg total) by mouth daily as needed (low back pain). Change to daily as needed for pain if possible    tiZANidine (ZANAFLEX) 2 MG tablet Take 1-2 tabs PO QHS PRN muscle pain/spasms     No current facility-administered medications for this visit.       Review of Systems:  Review of Systems   Constitutional: Negative for fever.   Gastrointestinal: Negative for nausea and vomiting.   Genitourinary: Negative for menorrhagia, menstrual problem, pelvic pain, vaginal bleeding, vaginal discharge, vaginal pain and vaginal odor.   Integumentary:  Negative for breast mass.   Breast: Negative for lump and mass       OBJECTIVE:     Physical Exam:  Physical Exam  Vitals and nursing note reviewed.   Constitutional:       Appearance: She  is well-developed.   Neck:      Thyroid: No thyromegaly.      Trachea: No tracheal deviation.   Cardiovascular:      Rate and Rhythm: Normal rate and regular rhythm.      Heart sounds: Normal heart sounds.   Pulmonary:      Effort: Pulmonary effort is normal.      Breath sounds: Normal breath sounds.   Chest:   Breasts: Breasts are symmetrical.      Right: No inverted nipple, mass, nipple discharge, skin change or tenderness.      Left: No inverted nipple, mass, nipple discharge, skin change or tenderness.       Abdominal:      Palpations: Abdomen is soft.   Genitourinary:     General: Normal vulva.      Labia:         Right: No rash, tenderness, lesion or injury.         Left: No rash, tenderness, lesion or injury.       Urethra: No prolapse, urethral pain, urethral swelling or urethral lesion.      Vagina: No signs of injury and foreign body. Prolapsed vaginal walls present. No vaginal discharge, erythema, tenderness or bleeding.      Cervix: No cervical motion tenderness, discharge or friability.      Uterus: Not deviated, not enlarged, not fixed and not tender.       Adnexa:         Right: No mass, tenderness or fullness.          Left: No mass, tenderness or fullness.        Rectum: No anal fissure or external hemorrhoid.      Comments: Urethral meatus: normal size, no lesions  Bladder: no fullness, masses or tenderness  Stage 4 cystocele  Musculoskeletal:      Cervical back: Normal range of motion and neck supple.   Neurological:      Mental Status: She is alert and oriented to person, place, and time.   Psychiatric:         Behavior: Behavior normal.         Thought Content: Thought content normal.         Judgment: Judgment normal.         Chaperoned by: Anca    ASSESSMENT:       ICD-10-CM ICD-9-CM    1. Well woman exam with routine gynecological exam  Z01.419 V72.31    2. Encounter for screening mammogram for malignant neoplasm of breast  Z12.31 V76.12 Mammo Digital Screening Bilat w/ [facility]   3. Cystocele  with prolapse  N81.4 618.4 Ambulatory referral/consult to Urogynecology   4. Chronic bilateral low back pain without sciatica  M54.50 724.2 meloxicam (MOBIC) 15 MG tablet    G89.29 338.29           Plan:      Omid was seen today for well woman and vaginal prolapse.    Diagnoses and all orders for this visit:    Well woman exam with routine gynecological exam    Encounter for screening mammogram for malignant neoplasm of breast  -     Mammo Digital Screening Bilat w/ [facility]; Future    Cystocele with prolapse  -     Ambulatory referral/consult to Urogynecology; Future    Chronic bilateral low back pain without sciatica  -     meloxicam (MOBIC) 15 MG tablet; Take 1 tablet (15 mg total) by mouth daily as needed (low back pain). Change to daily as needed for pain if possible        Orders Placed This Encounter   Procedures    Mammo Digital Screening Bilat w/ Mookie    Ambulatory referral/consult to Urogynecology       Well Woman:   - Pap smear: up to date  - Mammogram: up to date  - Colonoscopy: up to date  - Dexa: due age 64  - Immunizations: with pcp  - Labs: with pcp  - Exercise counseling provided    Cystocele:  - referred to urogyn; briefly discussed surgery vs pessary    Mobic refilled per patient's request.      Follow up in one year for annual, or prn.    Juhi Polanco

## 2022-05-13 ENCOUNTER — OFFICE VISIT (OUTPATIENT)
Dept: UROGYNECOLOGY | Facility: CLINIC | Age: 62
End: 2022-05-13
Payer: MEDICARE

## 2022-05-13 VITALS — HEIGHT: 64 IN | BODY MASS INDEX: 31.22 KG/M2

## 2022-05-13 DIAGNOSIS — N81.4 CYSTOCELE WITH PROLAPSE: ICD-10-CM

## 2022-05-13 PROCEDURE — 99214 OFFICE O/P EST MOD 30 MIN: CPT | Mod: S$GLB,,, | Performed by: OBSTETRICS & GYNECOLOGY

## 2022-05-13 PROCEDURE — 3008F PR BODY MASS INDEX (BMI) DOCUMENTED: ICD-10-PCS | Mod: CPTII,S$GLB,, | Performed by: OBSTETRICS & GYNECOLOGY

## 2022-05-13 PROCEDURE — 3066F NEPHROPATHY DOC TX: CPT | Mod: CPTII,S$GLB,, | Performed by: OBSTETRICS & GYNECOLOGY

## 2022-05-13 PROCEDURE — 99214 PR OFFICE/OUTPT VISIT, EST, LEVL IV, 30-39 MIN: ICD-10-PCS | Mod: S$GLB,,, | Performed by: OBSTETRICS & GYNECOLOGY

## 2022-05-13 PROCEDURE — 3066F PR DOCUMENTATION OF TREATMENT FOR NEPHROPATHY: ICD-10-PCS | Mod: CPTII,S$GLB,, | Performed by: OBSTETRICS & GYNECOLOGY

## 2022-05-13 PROCEDURE — 99999 PR PBB SHADOW E&M-EST. PATIENT-LVL IV: CPT | Mod: PBBFAC,,, | Performed by: OBSTETRICS & GYNECOLOGY

## 2022-05-13 PROCEDURE — 99999 PR PBB SHADOW E&M-EST. PATIENT-LVL IV: ICD-10-PCS | Mod: PBBFAC,,, | Performed by: OBSTETRICS & GYNECOLOGY

## 2022-05-13 PROCEDURE — 3008F BODY MASS INDEX DOCD: CPT | Mod: CPTII,S$GLB,, | Performed by: OBSTETRICS & GYNECOLOGY

## 2022-05-13 NOTE — PROGRESS NOTES
Subjective:      Patient ID: Omid Rascon is a 62 y.o. female.    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 she here all records transferred she has now beennd is referred secondary to prolapse there is no incontinence    Patient with absolutely no urinary incontinence      Past Medical History:   Diagnosis Date    Anemia     CKD (chronic kidney disease), stage II 2016    Depression     Hemoglobin C trait     Hypertension     Impaired hearing     Lumbago 8/10/2015    Monoclonal gammopathy 11/10/2016    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    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       Past Surgical History:   Procedure Laterality Date    ABLATION      LASER ABLATION OF THE CERVIX         GYN & OB History  No LMP recorded. Patient has had an ablation.   Date of Last Pap: No result found    OB History    Para Term  AB Living   4 2 2   2     SAB IAB Ectopic Multiple Live Births   2              # Outcome Date GA Lbr Kee/2nd Weight Sex Delivery Anes PTL Lv   4 Term            3 Term            2 SAB            1 SAB                Health Maintenance       Date Due Completion Date    Pneumococcal Vaccines (Age 0-64) (1 - PCV) Never done ---    Colorectal Cancer Screening 10/21/2021 10/21/2020    Override on 2010: Done (reportedly normal per patient)    COVID-19 Vaccine (4 - Booster for Pfizer series) 2022    Mammogram 2022    Cervical Cancer Screening 2023    Lipid Panel 10/09/2025 10/9/2020    TETANUS VACCINE 04/10/2027 4/10/2017          Family History   Problem Relation Age of Onset    Stroke Father     Heart failure Mother     No Known Problems Sister     No Known Problems Brother     No Known Problems  Brother     No Known Problems Brother     No Known Problems Sister     No Known Problems Sister     No Known Problems Sister     No Known Problems Brother     No Known Problems Sister     Diabetes Sister     No Known Problems Maternal Aunt     No Known Problems Maternal Uncle     No Known Problems Paternal Aunt     No Known Problems Paternal Uncle     No Known Problems Maternal Grandmother     No Known Problems Maternal Grandfather     No Known Problems Paternal Grandmother     No Known Problems Paternal Grandfather     Amblyopia Neg Hx     Blindness Neg Hx     Cancer Neg Hx     Cataracts Neg Hx     Glaucoma Neg Hx     Hypertension Neg Hx     Macular degeneration Neg Hx     Retinal detachment Neg Hx     Strabismus Neg Hx     Thyroid disease Neg Hx     Breast cancer Neg Hx     Colon cancer Neg Hx     Ovarian cancer Neg Hx        Social History     Socioeconomic History    Marital status: Other   Tobacco Use    Smoking status: Never Smoker    Smokeless tobacco: Never Used   Substance and Sexual Activity    Alcohol use: No    Drug use: No    Sexual activity: Yes     Partners: Male     Birth control/protection: See Surgical Hx     Social Determinants of Health     Financial Resource Strain: Low Risk     Difficulty of Paying Living Expenses: Not very hard   Food Insecurity: No Food Insecurity    Worried About Running Out of Food in the Last Year: Never true    Ran Out of Food in the Last Year: Never true   Transportation Needs: Unmet Transportation Needs    Lack of Transportation (Medical): Yes    Lack of Transportation (Non-Medical): No   Physical Activity: Sufficiently Active    Days of Exercise per Week: 6 days    Minutes of Exercise per Session: 60 min   Stress: No Stress Concern Present    Feeling of Stress : Not at all   Social Connections: Unknown    Frequency of Communication with Friends and Family: More than three times a week    Frequency of Social Gatherings with  "Friends and Family: More than three times a week    Active Member of Clubs or Organizations: Yes    Attends Club or Organization Meetings: More than 4 times per year    Marital Status: Patient refused   Housing Stability: Low Risk     Unable to Pay for Housing in the Last Year: No    Number of Places Lived in the Last Year: 1    Unstable Housing in the Last Year: No       Review of Systems  Review of Systems   All other systems reviewed and are negative.         Objective:   Ht 5' 4" (1.626 m)   BMI 31.22 kg/m²     Physical Exam     Stage 4 anterior compartment defect cervix to 0 station as well    Assessment:     1. Cystocele with prolapse            Plan:     1. Cystocele with prolapse      Long discussion with the patient initially I used the power point presentation very slowly in precisely to discuss normal anatomy verses her anatomy from there we then talked about all options from pessary showed her multiple different examples to surgical options all discussed with going to move forward with robotic assisted supracervical hysterectomy bilateral salpingo oophorectomy with robotic sacral colpopexy images shown on different types of graphs utilized as well as different diagrams to his make sure the patient comprehended I then repeated essentially the entire presentation the patient's son after obtaining permission from the patient.    Will be setting up and moving forward future  There are no Patient Instructions on file for this visit.    Expectant Management:  Patient understands we will continue to monitor her level of anatomic distortion without any type of active intervention until a time where symptomatology can no longer be tolerated by patient and she wishes to have active management.    Conservative Therapy:  Patient understands she will be utilizing a a supportive device within the vagina which will need maintenance.  The clinic will support her in her maintenance of the pessary.  Patient " understands this is an active process which will need her input to maintain the pessary properly.     Surgical Management:

## 2022-05-20 ENCOUNTER — OFFICE VISIT (OUTPATIENT)
Dept: FAMILY MEDICINE | Facility: CLINIC | Age: 62
End: 2022-05-20
Payer: MEDICARE

## 2022-05-20 VITALS
DIASTOLIC BLOOD PRESSURE: 100 MMHG | HEIGHT: 64 IN | OXYGEN SATURATION: 96 % | TEMPERATURE: 98 F | SYSTOLIC BLOOD PRESSURE: 164 MMHG | WEIGHT: 181.44 LBS | BODY MASS INDEX: 30.98 KG/M2 | HEART RATE: 99 BPM

## 2022-05-20 DIAGNOSIS — Z00.00 ROUTINE MEDICAL EXAM: Primary | ICD-10-CM

## 2022-05-20 DIAGNOSIS — E04.2 MULTINODULAR THYROID: ICD-10-CM

## 2022-05-20 DIAGNOSIS — I77.1 TORTUOUS AORTA: Chronic | ICD-10-CM

## 2022-05-20 DIAGNOSIS — Z13.6 SCREENING FOR CARDIOVASCULAR CONDITION: ICD-10-CM

## 2022-05-20 DIAGNOSIS — Z12.11 ENCOUNTER FOR FIT (FECAL IMMUNOCHEMICAL TEST) SCREENING: ICD-10-CM

## 2022-05-20 DIAGNOSIS — I10 WHITE COAT SYNDROME WITH DIAGNOSIS OF HYPERTENSION: Chronic | ICD-10-CM

## 2022-05-20 DIAGNOSIS — D58.2 HEMOGLOBIN C TRAIT: Chronic | ICD-10-CM

## 2022-05-20 DIAGNOSIS — M25.59 PAIN IN OTHER SPECIFIED JOINT: ICD-10-CM

## 2022-05-20 DIAGNOSIS — N18.31 CHRONIC KIDNEY DISEASE, STAGE 3A: Chronic | ICD-10-CM

## 2022-05-20 DIAGNOSIS — F33.41 RECURRENT MAJOR DEPRESSIVE DISORDER, IN PARTIAL REMISSION: Chronic | ICD-10-CM

## 2022-05-20 DIAGNOSIS — E66.9 OBESITY, CLASS I, BMI 30-34.9: ICD-10-CM

## 2022-05-20 PROCEDURE — 99999 PR PBB SHADOW E&M-EST. PATIENT-LVL IV: ICD-10-PCS | Mod: PBBFAC,,, | Performed by: INTERNAL MEDICINE

## 2022-05-20 PROCEDURE — 1159F PR MEDICATION LIST DOCUMENTED IN MEDICAL RECORD: ICD-10-PCS | Mod: CPTII,S$GLB,, | Performed by: INTERNAL MEDICINE

## 2022-05-20 PROCEDURE — 3008F PR BODY MASS INDEX (BMI) DOCUMENTED: ICD-10-PCS | Mod: CPTII,S$GLB,, | Performed by: INTERNAL MEDICINE

## 2022-05-20 PROCEDURE — 3080F DIAST BP >= 90 MM HG: CPT | Mod: CPTII,S$GLB,, | Performed by: INTERNAL MEDICINE

## 2022-05-20 PROCEDURE — 99396 PR PREVENTIVE VISIT,EST,40-64: ICD-10-PCS | Mod: S$GLB,,, | Performed by: INTERNAL MEDICINE

## 2022-05-20 PROCEDURE — 1159F MED LIST DOCD IN RCRD: CPT | Mod: CPTII,S$GLB,, | Performed by: INTERNAL MEDICINE

## 2022-05-20 PROCEDURE — 1160F RVW MEDS BY RX/DR IN RCRD: CPT | Mod: CPTII,S$GLB,, | Performed by: INTERNAL MEDICINE

## 2022-05-20 PROCEDURE — 1160F PR REVIEW ALL MEDS BY PRESCRIBER/CLIN PHARMACIST DOCUMENTED: ICD-10-PCS | Mod: CPTII,S$GLB,, | Performed by: INTERNAL MEDICINE

## 2022-05-20 PROCEDURE — 99499 RISK ADDL DX/OHS AUDIT: ICD-10-PCS | Mod: HCNC,,, | Performed by: INTERNAL MEDICINE

## 2022-05-20 PROCEDURE — 3066F NEPHROPATHY DOC TX: CPT | Mod: CPTII,S$GLB,, | Performed by: INTERNAL MEDICINE

## 2022-05-20 PROCEDURE — 99999 PR PBB SHADOW E&M-EST. PATIENT-LVL IV: CPT | Mod: PBBFAC,,, | Performed by: INTERNAL MEDICINE

## 2022-05-20 PROCEDURE — 99499 UNLISTED E&M SERVICE: CPT | Mod: HCNC,,, | Performed by: INTERNAL MEDICINE

## 2022-05-20 PROCEDURE — 3080F PR MOST RECENT DIASTOLIC BLOOD PRESSURE >= 90 MM HG: ICD-10-PCS | Mod: CPTII,S$GLB,, | Performed by: INTERNAL MEDICINE

## 2022-05-20 PROCEDURE — 99396 PREV VISIT EST AGE 40-64: CPT | Mod: S$GLB,,, | Performed by: INTERNAL MEDICINE

## 2022-05-20 PROCEDURE — 3066F PR DOCUMENTATION OF TREATMENT FOR NEPHROPATHY: ICD-10-PCS | Mod: CPTII,S$GLB,, | Performed by: INTERNAL MEDICINE

## 2022-05-20 PROCEDURE — 3008F BODY MASS INDEX DOCD: CPT | Mod: CPTII,S$GLB,, | Performed by: INTERNAL MEDICINE

## 2022-05-20 PROCEDURE — 3077F SYST BP >= 140 MM HG: CPT | Mod: CPTII,S$GLB,, | Performed by: INTERNAL MEDICINE

## 2022-05-20 PROCEDURE — 3077F PR MOST RECENT SYSTOLIC BLOOD PRESSURE >= 140 MM HG: ICD-10-PCS | Mod: CPTII,S$GLB,, | Performed by: INTERNAL MEDICINE

## 2022-05-20 NOTE — PROGRESS NOTES
Assessment & Plan  Problem List Items Addressed This Visit        Psychiatric    Recurrent major depressive disorder, in partial remission (Chronic)    Current Assessment & Plan     Known to psychiatry.  Monitor               Cardiac/Vascular    White coat syndrome with diagnosis of hypertension (Chronic)    Overview     In Digital HTN program with excellent control.            Tortuous aorta (Chronic)    Overview     Stable, asymptomatic chronic condition.  Will continue to maximize risk factor reduction and adjust medication as needed.               Renal/    Chronic kidney disease, stage 3a (Chronic)    Overview     Reports adverse reactions to ACE-I in the past           Current Assessment & Plan     Known to renal.  Monitor               Oncology    Hemoglobin C trait (Chronic)    Overview     Followed by Heme Onc           Relevant Orders    CBC Auto Differential    APTT    Protime-INR       Endocrine    Multinodular thyroid (Chronic)    Overview     Seen on MRI c-spine.  Reassuring US 2019           Relevant Orders    TSH      Other Visit Diagnoses     Routine medical exam    -  Primary  -    Discussed healthy diet, regular exercise, necessary labs, age appropriate cancer screening, and routine vaccinations.       Encounter for FIT (fecal immunochemical test) screening    -  FitKit was given to patient on 5/20/2022 2:13 PM          Relevant Orders    Fecal Immunochemical Test (iFOBT)    Screening for cardiovascular condition    -  Screening labs due prior to follow up    Relevant Orders    Comprehensive Metabolic Panel    Lipid Panel    Obesity, Class I, BMI 30-34.9    -  The patient's BMI has been recorded in the chart. The patient has been provided educational materials regarding the benefits of attaining and maintaining a normal weight. We will continue to address and follow this issue during follow up visits.     Relevant Orders    Hemoglobin A1C    Pain in other specified joint     -  Left foot.  Feels  like a cyst on the dorsum of the foot.  Discussed ice and compression. Tylenol ok.  Avoid NSAIDs given CKD    Relevant Orders    APTT    Protime-INR            Health Maintenance reviewed, has [facility] sched.  Discussed COVID boost #2. .    Follow-up: Follow up in 6 weeks (on 6/28/2022) for pre-op.  ______________________________________________________________________    Chief Complaint  Chief Complaint   Patient presents with    Annual Exam       HPI  Omid Rascon is a 62 y.o. female with medical diagnoses as listed in the medical history and problem list that presents for routine physical.  Pt is known to me with their last appointment 10/2020.      Home Bp readings have remained well controlled.      Reports that she has been under more stress recently due to some family stressors and some deaths of loved ones as well (neighbor).      She is c/o left foot pain and swelling for the last few days.  No clear inciting event.  No redness or warmth.  No right foot symptoms.  Seemed to start after being outside with her dogs.       PAST MEDICAL HISTORY:  Past Medical History:   Diagnosis Date    Anemia     CKD (chronic kidney disease), stage II 5/16/2016    Depression     Hemoglobin C trait     Hypertension     Impaired hearing     Lumbago 8/10/2015    Monoclonal gammopathy 11/10/2016    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    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       PAST SURGICAL HISTORY:  Past Surgical History:   Procedure Laterality Date    ABLATION  2008    LASER ABLATION OF THE CERVIX         SOCIAL HISTORY:  Social History     Socioeconomic History    Marital status: Other   Tobacco Use    Smoking status: Never Smoker    Smokeless tobacco: Never Used   Substance and Sexual Activity    Alcohol use: No    Drug use: No    Sexual activity: Yes     Partners: Male     Birth control/protection: See Surgical Hx     Social Determinants of Health     Financial Resource Strain: Medium Risk    Difficulty of  Paying Living Expenses: Somewhat hard   Food Insecurity: No Food Insecurity    Worried About Running Out of Food in the Last Year: Never true    Ran Out of Food in the Last Year: Never true   Transportation Needs: Unmet Transportation Needs    Lack of Transportation (Medical): Yes    Lack of Transportation (Non-Medical): No   Physical Activity: Sufficiently Active    Days of Exercise per Week: 7 days    Minutes of Exercise per Session: 60 min   Stress: No Stress Concern Present    Feeling of Stress : Not at all   Social Connections: Unknown    Frequency of Communication with Friends and Family: More than three times a week    Frequency of Social Gatherings with Friends and Family: More than three times a week    Active Member of Clubs or Organizations: Yes    Attends Club or Organization Meetings: More than 4 times per year    Marital Status: Patient refused   Housing Stability: Low Risk     Unable to Pay for Housing in the Last Year: No    Number of Places Lived in the Last Year: 1    Unstable Housing in the Last Year: No       FAMILY HISTORY:  Family History   Problem Relation Age of Onset    Stroke Father     Heart failure Mother     No Known Problems Sister     No Known Problems Brother     No Known Problems Brother     No Known Problems Brother     No Known Problems Sister     No Known Problems Sister     No Known Problems Sister     No Known Problems Brother     No Known Problems Sister     Diabetes Sister     No Known Problems Maternal Aunt     No Known Problems Maternal Uncle     No Known Problems Paternal Aunt     No Known Problems Paternal Uncle     No Known Problems Maternal Grandmother     No Known Problems Maternal Grandfather     No Known Problems Paternal Grandmother     No Known Problems Paternal Grandfather     Amblyopia Neg Hx     Blindness Neg Hx     Cancer Neg Hx     Cataracts Neg Hx     Glaucoma Neg Hx     Hypertension Neg Hx     Macular degeneration Neg  "Hx     Retinal detachment Neg Hx     Strabismus Neg Hx     Thyroid disease Neg Hx     Breast cancer Neg Hx     Colon cancer Neg Hx     Ovarian cancer Neg Hx        ALLERGIES AND MEDICATIONS: updated and reviewed.  Review of patient's allergies indicates:   Allergen Reactions    Lactose     Sulfa (sulfonamide antibiotics) Swelling    Latex, natural rubber Rash    Shellfish containing products Rash    Strawberries [strawberry] Rash     Current Outpatient Medications   Medication Sig Dispense Refill    cetirizine (ZYRTEC) 10 MG tablet Take 1 tablet (10 mg total) by mouth daily as needed for Allergies or Rhinitis. 90 tablet 3    citalopram (CELEXA) 10 MG tablet       tiZANidine (ZANAFLEX) 2 MG tablet Take 1-2 tabs PO QHS PRN muscle pain/spasms (Patient not taking: Reported on 5/20/2022) 60 tablet 2     No current facility-administered medications for this visit.         ROS  Review of Systems   Constitutional: Positive for unexpected weight change. Negative for activity change.   HENT: Negative for hearing loss, rhinorrhea and trouble swallowing.    Eyes: Negative for discharge and visual disturbance.   Respiratory: Negative for chest tightness and wheezing.    Cardiovascular: Negative for chest pain and palpitations.   Gastrointestinal: Negative for blood in stool, constipation, diarrhea and vomiting.   Endocrine: Negative for polydipsia and polyuria.   Genitourinary: Negative for difficulty urinating, dysuria, hematuria and menstrual problem.   Musculoskeletal: Positive for arthralgias. Negative for joint swelling and neck pain.   Neurological: Negative for weakness and headaches.   Psychiatric/Behavioral: Negative for confusion and dysphoric mood.           Physical Exam  Vitals:    05/20/22 0812 05/20/22 0847   BP: (!) 190/100 (!) 164/100   Pulse: 99    Temp: 98.1 °F (36.7 °C)    SpO2: 96%    Weight: 82.3 kg (181 lb 7 oz)    Height: 5' 4" (1.626 m)     Body mass index is 31.14 kg/m².  Weight: 82.3 kg " "(181 lb 7 oz)   Height: 5' 4" (162.6 cm)   Physical Exam  Constitutional:       General: She is not in acute distress.     Appearance: She is well-developed.   HENT:      Head: Normocephalic and atraumatic.   Eyes:      General: Lids are normal. No scleral icterus.     Conjunctiva/sclera: Conjunctivae normal.      Pupils: Pupils are equal, round, and reactive to light.   Neck:      Thyroid: No thyromegaly.      Vascular: No carotid bruit or JVD.   Cardiovascular:      Rate and Rhythm: Normal rate and regular rhythm.      Pulses: Normal pulses.      Heart sounds: Normal heart sounds. No murmur heard.    No friction rub. No S3 or S4 sounds.   Pulmonary:      Effort: Pulmonary effort is normal.      Breath sounds: Normal breath sounds. No wheezing, rhonchi or rales.   Abdominal:      General: Bowel sounds are normal.      Palpations: Abdomen is soft.      Tenderness: There is no abdominal tenderness.   Musculoskeletal:      Cervical back: Full passive range of motion without pain and neck supple.      Right lower leg: No edema.      Left lower leg: No edema.      Left foot: Tenderness (with rubber mass about the size of a medium grape) present.        Legs:    Skin:     General: Skin is warm and dry.      Findings: No rash.   Neurological:      Mental Status: She is alert and oriented to person, place, and time.   Psychiatric:         Speech: Speech normal.         Behavior: Behavior normal.         Thought Content: Thought content normal.             Health Maintenance       Date Due Completion Date    Pneumococcal Vaccines (Age 0-64) (1 - PCV) Never done ---    Colorectal Cancer Screening 10/21/2021 10/21/2020    Override on 8/1/2010: Done (reportedly normal per patient)    COVID-19 Vaccine (4 - Booster for Pfizer series) 04/09/2022 12/9/2021    Mammogram 07/20/2022 7/20/2021    Cervical Cancer Screening 04/20/2023 4/20/2018    Lipid Panel 10/09/2025 10/9/2020    TETANUS VACCINE 04/10/2027 4/10/2017            "

## 2022-05-24 ENCOUNTER — PATIENT OUTREACH (OUTPATIENT)
Dept: ADMINISTRATIVE | Facility: HOSPITAL | Age: 62
End: 2022-05-24
Payer: MEDICARE

## 2022-05-24 ENCOUNTER — DOCUMENTATION ONLY (OUTPATIENT)
Dept: FAMILY MEDICINE | Facility: CLINIC | Age: 62
End: 2022-05-24
Payer: MEDICARE

## 2022-05-24 NOTE — PROGRESS NOTES
[facility] gap report - no 2022 results available, kit issued 05/20/22.

## 2022-06-13 ENCOUNTER — LAB VISIT (OUTPATIENT)
Dept: LAB | Facility: HOSPITAL | Age: 62
End: 2022-06-13
Attending: INTERNAL MEDICINE
Payer: MEDICARE

## 2022-06-13 DIAGNOSIS — Z12.11 ENCOUNTER FOR FIT (FECAL IMMUNOCHEMICAL TEST) SCREENING: ICD-10-CM

## 2022-06-13 PROCEDURE — 82274 ASSAY TEST FOR BLOOD FECAL: CPT | Performed by: INTERNAL MEDICINE

## 2022-06-15 NOTE — PROGRESS NOTES
Digital Medicine: Health  Follow-Up    The history is provided by the patient.             Reviewed BP Readings. Patients BP average is 117/77 mmHg, which is at goal. Patient's BP goal is less than Blood Pressure < 130/80 mmHg.           Additional Follow-up details: Omid returned my call and left a message that she was at an emergency appointment so could not  when I called. She is having surgery in July and trying to live a stress free life. Sent Mychart to acknowledge her call and let he know I will follow up in July.               There are no preventive care reminders to display for this patient.       Last 5 Patient Entered Readings                Current 30 Day Average: 117/77  Recent Readings 6/14/2022 6/8/2022 6/2/2022 5/27/2022 5/27/2022   SBP (mmHg) 118 112 130 112 112   DBP (mmHg) 78 76 80 76 76   Pulse 82 85 73 85 85

## 2022-06-16 ENCOUNTER — PATIENT MESSAGE (OUTPATIENT)
Dept: FAMILY MEDICINE | Facility: CLINIC | Age: 62
End: 2022-06-16
Payer: MEDICARE

## 2022-06-16 DIAGNOSIS — R19.5 POSITIVE FIT (FECAL IMMUNOCHEMICAL TEST): Primary | ICD-10-CM

## 2022-06-16 LAB — HEMOCCULT STL QL IA: POSITIVE

## 2022-06-24 ENCOUNTER — LAB VISIT (OUTPATIENT)
Dept: LAB | Facility: HOSPITAL | Age: 62
End: 2022-06-24
Attending: INTERNAL MEDICINE
Payer: MEDICARE

## 2022-06-24 DIAGNOSIS — M25.59 PAIN IN OTHER SPECIFIED JOINT: ICD-10-CM

## 2022-06-24 DIAGNOSIS — D58.2 HEMOGLOBIN C TRAIT: Chronic | ICD-10-CM

## 2022-06-24 DIAGNOSIS — E04.2 MULTINODULAR THYROID: ICD-10-CM

## 2022-06-24 DIAGNOSIS — Z13.6 SCREENING FOR CARDIOVASCULAR CONDITION: ICD-10-CM

## 2022-06-24 DIAGNOSIS — E66.9 OBESITY, CLASS I, BMI 30-34.9: ICD-10-CM

## 2022-06-24 LAB
ALBUMIN SERPL BCP-MCNC: 4 G/DL (ref 3.5–5.2)
ALP SERPL-CCNC: 51 U/L (ref 55–135)
ALT SERPL W/O P-5'-P-CCNC: 30 U/L (ref 10–44)
ANION GAP SERPL CALC-SCNC: 7 MMOL/L (ref 8–16)
APTT BLDCRRT: 29.2 SEC (ref 21–32)
AST SERPL-CCNC: 26 U/L (ref 10–40)
BASOPHILS # BLD AUTO: 0.07 K/UL (ref 0–0.2)
BASOPHILS NFR BLD: 1.4 % (ref 0–1.9)
BILIRUB SERPL-MCNC: 0.7 MG/DL (ref 0.1–1)
BUN SERPL-MCNC: 14 MG/DL (ref 8–23)
CALCIUM SERPL-MCNC: 9.6 MG/DL (ref 8.7–10.5)
CHLORIDE SERPL-SCNC: 105 MMOL/L (ref 95–110)
CHOLEST SERPL-MCNC: 174 MG/DL (ref 120–199)
CHOLEST/HDLC SERPL: 3.3 {RATIO} (ref 2–5)
CO2 SERPL-SCNC: 29 MMOL/L (ref 23–29)
CREAT SERPL-MCNC: 1.3 MG/DL (ref 0.5–1.4)
DIFFERENTIAL METHOD: ABNORMAL
EOSINOPHIL # BLD AUTO: 0.2 K/UL (ref 0–0.5)
EOSINOPHIL NFR BLD: 3.5 % (ref 0–8)
ERYTHROCYTE [DISTWIDTH] IN BLOOD BY AUTOMATED COUNT: 14.7 % (ref 11.5–14.5)
EST. GFR  (AFRICAN AMERICAN): 50.5 ML/MIN/1.73 M^2
EST. GFR  (NON AFRICAN AMERICAN): 43.8 ML/MIN/1.73 M^2
ESTIMATED AVG GLUCOSE: 117 MG/DL (ref 68–131)
GLUCOSE SERPL-MCNC: 96 MG/DL (ref 70–110)
HBA1C MFR BLD: 5.7 % (ref 4–5.6)
HCT VFR BLD AUTO: 37.2 % (ref 37–48.5)
HDLC SERPL-MCNC: 52 MG/DL (ref 40–75)
HDLC SERPL: 29.9 % (ref 20–50)
HGB BLD-MCNC: 12.3 G/DL (ref 12–16)
IMM GRANULOCYTES # BLD AUTO: 0.01 K/UL (ref 0–0.04)
IMM GRANULOCYTES NFR BLD AUTO: 0.2 % (ref 0–0.5)
INR PPP: 1.1 (ref 0.8–1.2)
LDLC SERPL CALC-MCNC: 101 MG/DL (ref 63–159)
LYMPHOCYTES # BLD AUTO: 1.9 K/UL (ref 1–4.8)
LYMPHOCYTES NFR BLD: 39.4 % (ref 18–48)
MCH RBC QN AUTO: 27.3 PG (ref 27–31)
MCHC RBC AUTO-ENTMCNC: 33.1 G/DL (ref 32–36)
MCV RBC AUTO: 83 FL (ref 82–98)
MONOCYTES # BLD AUTO: 0.5 K/UL (ref 0.3–1)
MONOCYTES NFR BLD: 10.4 % (ref 4–15)
NEUTROPHILS # BLD AUTO: 2.2 K/UL (ref 1.8–7.7)
NEUTROPHILS NFR BLD: 45.1 % (ref 38–73)
NONHDLC SERPL-MCNC: 122 MG/DL
NRBC BLD-RTO: 0 /100 WBC
PLATELET # BLD AUTO: 210 K/UL (ref 150–450)
PMV BLD AUTO: 11.4 FL (ref 9.2–12.9)
POTASSIUM SERPL-SCNC: 4.3 MMOL/L (ref 3.5–5.1)
PROT SERPL-MCNC: 7.9 G/DL (ref 6–8.4)
PROTHROMBIN TIME: 11.5 SEC (ref 9–12.5)
RBC # BLD AUTO: 4.51 M/UL (ref 4–5.4)
SODIUM SERPL-SCNC: 141 MMOL/L (ref 136–145)
TRIGL SERPL-MCNC: 105 MG/DL (ref 30–150)
TSH SERPL DL<=0.005 MIU/L-ACNC: 1.36 UIU/ML (ref 0.4–4)
WBC # BLD AUTO: 4.92 K/UL (ref 3.9–12.7)

## 2022-06-24 PROCEDURE — 85025 COMPLETE CBC W/AUTO DIFF WBC: CPT | Performed by: INTERNAL MEDICINE

## 2022-06-24 PROCEDURE — 85610 PROTHROMBIN TIME: CPT | Performed by: INTERNAL MEDICINE

## 2022-06-24 PROCEDURE — 83036 HEMOGLOBIN GLYCOSYLATED A1C: CPT | Performed by: INTERNAL MEDICINE

## 2022-06-24 PROCEDURE — 36415 COLL VENOUS BLD VENIPUNCTURE: CPT | Mod: PO | Performed by: INTERNAL MEDICINE

## 2022-06-24 PROCEDURE — 80061 LIPID PANEL: CPT | Performed by: INTERNAL MEDICINE

## 2022-06-24 PROCEDURE — 84443 ASSAY THYROID STIM HORMONE: CPT | Performed by: INTERNAL MEDICINE

## 2022-06-24 PROCEDURE — 85730 THROMBOPLASTIN TIME PARTIAL: CPT | Performed by: INTERNAL MEDICINE

## 2022-06-24 PROCEDURE — 80053 COMPREHEN METABOLIC PANEL: CPT | Performed by: INTERNAL MEDICINE

## 2022-06-28 ENCOUNTER — OFFICE VISIT (OUTPATIENT)
Dept: FAMILY MEDICINE | Facility: CLINIC | Age: 62
End: 2022-06-28
Payer: MEDICARE

## 2022-06-28 VITALS
SYSTOLIC BLOOD PRESSURE: 160 MMHG | WEIGHT: 184.31 LBS | DIASTOLIC BLOOD PRESSURE: 90 MMHG | TEMPERATURE: 98 F | HEART RATE: 76 BPM | OXYGEN SATURATION: 97 % | BODY MASS INDEX: 31.47 KG/M2 | HEIGHT: 64 IN

## 2022-06-28 DIAGNOSIS — Z01.818 PRE-OPERATIVE EXAMINATION FOR INTERNAL MEDICINE: Primary | ICD-10-CM

## 2022-06-28 DIAGNOSIS — E04.2 MULTINODULAR THYROID: Chronic | ICD-10-CM

## 2022-06-28 DIAGNOSIS — N18.31 CHRONIC KIDNEY DISEASE, STAGE 3A: Chronic | ICD-10-CM

## 2022-06-28 DIAGNOSIS — I10 WHITE COAT SYNDROME WITH DIAGNOSIS OF HYPERTENSION: Chronic | ICD-10-CM

## 2022-06-28 DIAGNOSIS — I49.8 SINUS ARRHYTHMIA SEEN ON ELECTROCARDIOGRAPHY: ICD-10-CM

## 2022-06-28 PROCEDURE — 3077F PR MOST RECENT SYSTOLIC BLOOD PRESSURE >= 140 MM HG: ICD-10-PCS | Mod: CPTII,S$GLB,, | Performed by: INTERNAL MEDICINE

## 2022-06-28 PROCEDURE — 3008F PR BODY MASS INDEX (BMI) DOCUMENTED: ICD-10-PCS | Mod: CPTII,S$GLB,, | Performed by: INTERNAL MEDICINE

## 2022-06-28 PROCEDURE — 99499 RISK ADDL DX/OHS AUDIT: ICD-10-PCS | Mod: S$GLB,,, | Performed by: INTERNAL MEDICINE

## 2022-06-28 PROCEDURE — 3077F SYST BP >= 140 MM HG: CPT | Mod: CPTII,S$GLB,, | Performed by: INTERNAL MEDICINE

## 2022-06-28 PROCEDURE — 1159F MED LIST DOCD IN RCRD: CPT | Mod: CPTII,S$GLB,, | Performed by: INTERNAL MEDICINE

## 2022-06-28 PROCEDURE — 1160F RVW MEDS BY RX/DR IN RCRD: CPT | Mod: CPTII,S$GLB,, | Performed by: INTERNAL MEDICINE

## 2022-06-28 PROCEDURE — 1159F PR MEDICATION LIST DOCUMENTED IN MEDICAL RECORD: ICD-10-PCS | Mod: CPTII,S$GLB,, | Performed by: INTERNAL MEDICINE

## 2022-06-28 PROCEDURE — 3008F BODY MASS INDEX DOCD: CPT | Mod: CPTII,S$GLB,, | Performed by: INTERNAL MEDICINE

## 2022-06-28 PROCEDURE — 1160F PR REVIEW ALL MEDS BY PRESCRIBER/CLIN PHARMACIST DOCUMENTED: ICD-10-PCS | Mod: CPTII,S$GLB,, | Performed by: INTERNAL MEDICINE

## 2022-06-28 PROCEDURE — 99999 PR PBB SHADOW E&M-EST. PATIENT-LVL IV: CPT | Mod: PBBFAC,,, | Performed by: INTERNAL MEDICINE

## 2022-06-28 PROCEDURE — 93010 EKG 12-LEAD: ICD-10-PCS | Mod: S$GLB,,, | Performed by: INTERNAL MEDICINE

## 2022-06-28 PROCEDURE — 3044F PR MOST RECENT HEMOGLOBIN A1C LEVEL <7.0%: ICD-10-PCS | Mod: CPTII,S$GLB,, | Performed by: INTERNAL MEDICINE

## 2022-06-28 PROCEDURE — 3066F PR DOCUMENTATION OF TREATMENT FOR NEPHROPATHY: ICD-10-PCS | Mod: CPTII,S$GLB,, | Performed by: INTERNAL MEDICINE

## 2022-06-28 PROCEDURE — 3044F HG A1C LEVEL LT 7.0%: CPT | Mod: CPTII,S$GLB,, | Performed by: INTERNAL MEDICINE

## 2022-06-28 PROCEDURE — 3066F NEPHROPATHY DOC TX: CPT | Mod: CPTII,S$GLB,, | Performed by: INTERNAL MEDICINE

## 2022-06-28 PROCEDURE — 93005 EKG 12-LEAD: ICD-10-PCS | Mod: S$GLB,,, | Performed by: INTERNAL MEDICINE

## 2022-06-28 PROCEDURE — 3080F DIAST BP >= 90 MM HG: CPT | Mod: CPTII,S$GLB,, | Performed by: INTERNAL MEDICINE

## 2022-06-28 PROCEDURE — 3080F PR MOST RECENT DIASTOLIC BLOOD PRESSURE >= 90 MM HG: ICD-10-PCS | Mod: CPTII,S$GLB,, | Performed by: INTERNAL MEDICINE

## 2022-06-28 PROCEDURE — 99214 PR OFFICE/OUTPT VISIT, EST, LEVL IV, 30-39 MIN: ICD-10-PCS | Mod: S$GLB,,, | Performed by: INTERNAL MEDICINE

## 2022-06-28 PROCEDURE — 99999 PR PBB SHADOW E&M-EST. PATIENT-LVL IV: ICD-10-PCS | Mod: PBBFAC,,, | Performed by: INTERNAL MEDICINE

## 2022-06-28 PROCEDURE — 93010 ELECTROCARDIOGRAM REPORT: CPT | Mod: S$GLB,,, | Performed by: INTERNAL MEDICINE

## 2022-06-28 PROCEDURE — 99214 OFFICE O/P EST MOD 30 MIN: CPT | Mod: S$GLB,,, | Performed by: INTERNAL MEDICINE

## 2022-06-28 PROCEDURE — 93005 ELECTROCARDIOGRAM TRACING: CPT | Mod: S$GLB,,, | Performed by: INTERNAL MEDICINE

## 2022-06-28 PROCEDURE — 99499 UNLISTED E&M SERVICE: CPT | Mod: S$GLB,,, | Performed by: INTERNAL MEDICINE

## 2022-06-28 NOTE — PROGRESS NOTES
Assessment & Plan  Problem List Items Addressed This Visit        Cardiac/Vascular    White coat syndrome with diagnosis of hypertension (Chronic)    Overview     In Digital HTN program with excellent control.            Current Assessment & Plan     If persistent elevation melissa-operatively could consider using IV labetalol PRN while monitoring her pulse closely.  If her pulse is low, hydralazine can be used.               Renal/    Chronic kidney disease, stage 3a (Chronic)    Overview     Reports adverse reactions to ACE-I in the past           Current Assessment & Plan     Continue sodium restriction, and avoidance of nephrotoxic agents.               Endocrine    Multinodular thyroid (Chronic)    Overview     Seen on MRI c-spine.  Reassuring US 2019           Current Assessment & Plan     Normal TSH.  Monitor              Other Visit Diagnoses     Pre-operative examination for internal medicine    -  Primary  -    Generally doing well.  EKG per my read shows sinus arrhythmia without AV block, prolonged QTc, Q wave, TWI or ST changes.   This is new since her last EKG.  Unable to perform high level of METs due to back pain.  Will refer to cardiology for pre-operative evaluation.      Other than this, she is medically maximized for her Urogyn procedure with attention to her BP as above.     Relevant Orders    EKG 12-lead    Ambulatory referral/consult to Cardiology    Sinus arrhythmia seen on electrocardiography    -  As above    Relevant Orders    Ambulatory referral/consult to Cardiology            Health Maintenance reviewed, C-scope after surgery recovery period.     Follow-up: Follow up if symptoms worsen or fail to improve.  ______________________________________________________________________    Chief Complaint  Chief Complaint   Patient presents with    Pre-op Exam       HPI  Omid Macias Abiodun is a 62 y.o. female with medical diagnoses as listed in the medical history and problem list that presents  for pre-op.  Pt is known to me with their last appointment 5/20/2022.  She will be having a supracervical hysterectomy bilateral salpingo oophorectomy with robotic sacral colpopexy.       She had labs prior to this OV that showed reassuring CBC, coagulation studies, Lipids, TSH.  Her renal function was overall stable.  A1c noted to have returned 5.7%.    No acute complaints.  Taking and tolerating medications as prescribed without perceived side effects.  No CP, SOB, palpitations.  She can perform above 4 functional METs without cardiorespiratory symptoms including working in her yard.      Not taking any ASA or NSAIDs.        PAST MEDICAL HISTORY:  Past Medical History:   Diagnosis Date    Anemia     CKD (chronic kidney disease), stage II 5/16/2016    Depression     Hemoglobin C trait     Hypertension     Impaired hearing     Lumbago 8/10/2015    Monoclonal gammopathy 11/10/2016    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    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       PAST SURGICAL HISTORY:  Past Surgical History:   Procedure Laterality Date    ABLATION  2008    LASER ABLATION OF THE CERVIX         SOCIAL HISTORY:  Social History     Socioeconomic History    Marital status: Other   Tobacco Use    Smoking status: Never Smoker    Smokeless tobacco: Never Used   Substance and Sexual Activity    Alcohol use: No    Drug use: No    Sexual activity: Yes     Partners: Male     Birth control/protection: See Surgical Hx     Social Determinants of Health     Financial Resource Strain: Low Risk     Difficulty of Paying Living Expenses: Not very hard   Food Insecurity: No Food Insecurity    Worried About Running Out of Food in the Last Year: Never true    Ran Out of Food in the Last Year: Never true   Transportation Needs: No Transportation Needs    Lack of Transportation (Medical): No    Lack of Transportation (Non-Medical): No   Physical Activity: Sufficiently Active    Days of Exercise per Week: 7 days    Minutes  of Exercise per Session: 60 min   Stress: No Stress Concern Present    Feeling of Stress : Not at all   Social Connections: Unknown    Frequency of Communication with Friends and Family: More than three times a week    Frequency of Social Gatherings with Friends and Family: More than three times a week    Active Member of Clubs or Organizations: Yes    Attends Club or Organization Meetings: More than 4 times per year    Marital Status: Patient refused   Housing Stability: Low Risk     Unable to Pay for Housing in the Last Year: No    Number of Places Lived in the Last Year: 1    Unstable Housing in the Last Year: No       FAMILY HISTORY:  Family History   Problem Relation Age of Onset    Stroke Father     Heart failure Mother     No Known Problems Sister     No Known Problems Brother     No Known Problems Brother     No Known Problems Brother     No Known Problems Sister     No Known Problems Sister     No Known Problems Sister     No Known Problems Brother     No Known Problems Sister     Diabetes Sister     No Known Problems Maternal Aunt     No Known Problems Maternal Uncle     No Known Problems Paternal Aunt     No Known Problems Paternal Uncle     No Known Problems Maternal Grandmother     No Known Problems Maternal Grandfather     No Known Problems Paternal Grandmother     No Known Problems Paternal Grandfather     Amblyopia Neg Hx     Blindness Neg Hx     Cancer Neg Hx     Cataracts Neg Hx     Glaucoma Neg Hx     Hypertension Neg Hx     Macular degeneration Neg Hx     Retinal detachment Neg Hx     Strabismus Neg Hx     Thyroid disease Neg Hx     Breast cancer Neg Hx     Colon cancer Neg Hx     Ovarian cancer Neg Hx        ALLERGIES AND MEDICATIONS: updated and reviewed.  Review of patient's allergies indicates:   Allergen Reactions    Lactose     Sulfa (sulfonamide antibiotics) Swelling    Latex, natural rubber Rash    Shellfish containing products Rash     "Strawberries [strawberry] Rash     Current Outpatient Medications   Medication Sig Dispense Refill    cetirizine (ZYRTEC) 10 MG tablet Take 1 tablet (10 mg total) by mouth daily as needed for Allergies or Rhinitis. 90 tablet 3    tiZANidine (ZANAFLEX) 2 MG tablet Take 1-2 tabs PO QHS PRN muscle pain/spasms (Patient not taking: No sig reported) 60 tablet 2     No current facility-administered medications for this visit.         ROS  Review of Systems        Physical Exam  Vitals:    06/28/22 1442   BP: (!) 160/90   Pulse: 76   Temp: 97.9 °F (36.6 °C)   SpO2: 97%   Weight: 83.6 kg (184 lb 4.9 oz)   Height: 5' 4" (1.626 m)    Body mass index is 31.64 kg/m².  Weight: 83.6 kg (184 lb 4.9 oz)   Height: 5' 4" (162.6 cm)   Physical Exam        Health Maintenance       Date Due Completion Date    Pneumococcal Vaccines (Age 0-64) (1 - PCV) Never done ---    COVID-19 Vaccine (4 - Booster for Pfizer series) 04/09/2022 12/9/2021    Mammogram 07/20/2022 7/20/2021    Cervical Cancer Screening 04/20/2023 4/20/2018    Colorectal Cancer Screening 06/16/2023 6/16/2022    Override on 8/1/2010: Done (reportedly normal per patient)    TETANUS VACCINE 04/10/2027 4/10/2017    Lipid Panel 06/24/2027 6/24/2022            "

## 2022-06-28 NOTE — PATIENT INSTRUCTIONS
Below are some numbers for [facility] that you can call to schedule things for the most convenient time for you.      Colonoscopy [phone]    Make sure they schedule you after you have recovered from your surgery.  October or November should be good.

## 2022-06-28 NOTE — Clinical Note
Good afternoon,  I just wanted to let you know that Mrs. Rascon was noted to have a sinus arrhythmia on her pre-op ekg today that is new.  I'm referring her to cardiology for additional testing.    This should not delay her surgery date.  She's good to go from my standpoint and would imaging her pre-op testing with cards will be fine.   Sincerely, Rey

## 2022-06-29 ENCOUNTER — OFFICE VISIT (OUTPATIENT)
Dept: CARDIOLOGY | Facility: CLINIC | Age: 62
End: 2022-06-29
Payer: MEDICARE

## 2022-06-29 VITALS
SYSTOLIC BLOOD PRESSURE: 160 MMHG | DIASTOLIC BLOOD PRESSURE: 89 MMHG | WEIGHT: 185.19 LBS | BODY MASS INDEX: 31.79 KG/M2 | OXYGEN SATURATION: 99 % | HEART RATE: 71 BPM

## 2022-06-29 DIAGNOSIS — Z01.810 PREOPERATIVE CARDIOVASCULAR EXAMINATION: Primary | ICD-10-CM

## 2022-06-29 DIAGNOSIS — I49.8 SINUS ARRHYTHMIA SEEN ON ELECTROCARDIOGRAPHY: ICD-10-CM

## 2022-06-29 DIAGNOSIS — E66.09 CLASS 1 OBESITY DUE TO EXCESS CALORIES WITH SERIOUS COMORBIDITY AND BODY MASS INDEX (BMI) OF 31.0 TO 31.9 IN ADULT: ICD-10-CM

## 2022-06-29 DIAGNOSIS — R01.1 UNDIAGNOSED CARDIAC MURMURS: ICD-10-CM

## 2022-06-29 DIAGNOSIS — R94.31 NONSPECIFIC ABNORMAL ELECTROCARDIOGRAM (ECG) (EKG): ICD-10-CM

## 2022-06-29 DIAGNOSIS — I10 PRIMARY HYPERTENSION: ICD-10-CM

## 2022-06-29 PROCEDURE — 3008F BODY MASS INDEX DOCD: CPT | Mod: CPTII,S$GLB,, | Performed by: INTERNAL MEDICINE

## 2022-06-29 PROCEDURE — 99999 PR PBB SHADOW E&M-EST. PATIENT-LVL III: CPT | Mod: PBBFAC,,, | Performed by: INTERNAL MEDICINE

## 2022-06-29 PROCEDURE — 1159F MED LIST DOCD IN RCRD: CPT | Mod: CPTII,S$GLB,, | Performed by: INTERNAL MEDICINE

## 2022-06-29 PROCEDURE — 3066F NEPHROPATHY DOC TX: CPT | Mod: CPTII,S$GLB,, | Performed by: INTERNAL MEDICINE

## 2022-06-29 PROCEDURE — 1159F PR MEDICATION LIST DOCUMENTED IN MEDICAL RECORD: ICD-10-PCS | Mod: CPTII,S$GLB,, | Performed by: INTERNAL MEDICINE

## 2022-06-29 PROCEDURE — 3079F PR MOST RECENT DIASTOLIC BLOOD PRESSURE 80-89 MM HG: ICD-10-PCS | Mod: CPTII,S$GLB,, | Performed by: INTERNAL MEDICINE

## 2022-06-29 PROCEDURE — 3066F PR DOCUMENTATION OF TREATMENT FOR NEPHROPATHY: ICD-10-PCS | Mod: CPTII,S$GLB,, | Performed by: INTERNAL MEDICINE

## 2022-06-29 PROCEDURE — 3008F PR BODY MASS INDEX (BMI) DOCUMENTED: ICD-10-PCS | Mod: CPTII,S$GLB,, | Performed by: INTERNAL MEDICINE

## 2022-06-29 PROCEDURE — 3044F PR MOST RECENT HEMOGLOBIN A1C LEVEL <7.0%: ICD-10-PCS | Mod: CPTII,S$GLB,, | Performed by: INTERNAL MEDICINE

## 2022-06-29 PROCEDURE — 3044F HG A1C LEVEL LT 7.0%: CPT | Mod: CPTII,S$GLB,, | Performed by: INTERNAL MEDICINE

## 2022-06-29 PROCEDURE — 99999 PR PBB SHADOW E&M-EST. PATIENT-LVL III: ICD-10-PCS | Mod: PBBFAC,,, | Performed by: INTERNAL MEDICINE

## 2022-06-29 PROCEDURE — 3077F SYST BP >= 140 MM HG: CPT | Mod: CPTII,S$GLB,, | Performed by: INTERNAL MEDICINE

## 2022-06-29 PROCEDURE — 99204 PR OFFICE/OUTPT VISIT, NEW, LEVL IV, 45-59 MIN: ICD-10-PCS | Mod: S$GLB,,, | Performed by: INTERNAL MEDICINE

## 2022-06-29 PROCEDURE — 99204 OFFICE O/P NEW MOD 45 MIN: CPT | Mod: S$GLB,,, | Performed by: INTERNAL MEDICINE

## 2022-06-29 PROCEDURE — 3077F PR MOST RECENT SYSTOLIC BLOOD PRESSURE >= 140 MM HG: ICD-10-PCS | Mod: CPTII,S$GLB,, | Performed by: INTERNAL MEDICINE

## 2022-06-29 PROCEDURE — 3079F DIAST BP 80-89 MM HG: CPT | Mod: CPTII,S$GLB,, | Performed by: INTERNAL MEDICINE

## 2022-06-29 NOTE — PROGRESS NOTES
CARDIOLOGY CONSULTATION    REASON FOR CONSULT:   Omid Rascon is a 62 y.o. female who presents for preoperative cardiovascular evaluation.      HISTORY OF PRESENT ILLNESS:     Omid Rascon presents for preoperative cardiovascular evaluation prior to surgery for cystocele with prolapse.  Seen was seen by  her primary care physician on 6/28/2022.  EKG showed sinus rhythm with sinus arrhythmia.  Nonspecific T-wave abnormality anterolaterally with the PACs. Prior EKG is from 2016 that showed normal sinus rhythm. History of hypertension. Denies history of coronary artery disease. No need for prior evaluation.    CARDIOVASCULAR HISTORY:     None    PAST MEDICAL HISTORY:     Past Medical History:   Diagnosis Date    Anemia     CKD (chronic kidney disease), stage II 5/16/2016    Depression     Hemoglobin C trait     Hypertension     Impaired hearing     Lumbago 8/10/2015    Monoclonal gammopathy 11/10/2016    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    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       PAST SURGICAL HISTORY:     Past Surgical History:   Procedure Laterality Date    ABLATION  2008    LASER ABLATION OF THE CERVIX         ALLERGIES AND MEDICATION:     Review of patient's allergies indicates:   Allergen Reactions    Lactose     Sulfa (sulfonamide antibiotics) Swelling    Latex, natural rubber Rash    Shellfish containing products Rash    Strawberries [strawberry] Rash        Medication List          Accurate as of June 29, 2022  1:40 PM. If you have any questions, ask your nurse or doctor.            CONTINUE taking these medications    cetirizine 10 MG tablet  Commonly known as: ZYRTEC  Take 1 tablet (10 mg total) by mouth daily as needed for Allergies or Rhinitis.     tiZANidine 2 MG tablet  Commonly known as: ZANAFLEX  Take 1-2 tabs PO QHS PRN muscle pain/spasms            SOCIAL HISTORY:     Social History     Socioeconomic History    Marital status: Other   Tobacco Use    Smoking status:  Never Smoker    Smokeless tobacco: Never Used   Substance and Sexual Activity    Alcohol use: No    Drug use: No    Sexual activity: Yes     Partners: Male     Birth control/protection: See Surgical Hx     Social Determinants of Health     Financial Resource Strain: Low Risk     Difficulty of Paying Living Expenses: Not very hard   Food Insecurity: No Food Insecurity    Worried About Running Out of Food in the Last Year: Never true    Ran Out of Food in the Last Year: Never true   Transportation Needs: No Transportation Needs    Lack of Transportation (Medical): No    Lack of Transportation (Non-Medical): No   Physical Activity: Sufficiently Active    Days of Exercise per Week: 7 days    Minutes of Exercise per Session: 60 min   Stress: No Stress Concern Present    Feeling of Stress : Not at all   Social Connections: Unknown    Frequency of Communication with Friends and Family: More than three times a week    Frequency of Social Gatherings with Friends and Family: Twice a week    Active Member of Clubs or Organizations: Yes    Attends Club or Organization Meetings: More than 4 times per year    Marital Status: Patient refused   Housing Stability: Low Risk     Unable to Pay for Housing in the Last Year: No    Number of Places Lived in the Last Year: 1    Unstable Housing in the Last Year: No       FAMILY HISTORY:     Family History   Problem Relation Age of Onset    Stroke Father     Heart failure Mother     No Known Problems Sister     No Known Problems Brother     No Known Problems Brother     No Known Problems Brother     No Known Problems Sister     No Known Problems Sister     No Known Problems Sister     No Known Problems Brother     No Known Problems Sister     Diabetes Sister     No Known Problems Maternal Aunt     No Known Problems Maternal Uncle     No Known Problems Paternal Aunt     No Known Problems Paternal Uncle     No Known Problems Maternal Grandmother     No  Known Problems Maternal Grandfather     No Known Problems Paternal Grandmother     No Known Problems Paternal Grandfather     Amblyopia Neg Hx     Blindness Neg Hx     Cancer Neg Hx     Cataracts Neg Hx     Glaucoma Neg Hx     Hypertension Neg Hx     Macular degeneration Neg Hx     Retinal detachment Neg Hx     Strabismus Neg Hx     Thyroid disease Neg Hx     Breast cancer Neg Hx     Colon cancer Neg Hx     Ovarian cancer Neg Hx        REVIEW OF SYSTEMS:   Review of Systems   Constitutional: Negative for chills, diaphoresis, fever, malaise/fatigue and weight loss.   Eyes: Negative for blurred vision and pain.   Respiratory: Negative for sputum production, shortness of breath and wheezing.    Cardiovascular: Negative for chest pain, palpitations, orthopnea, claudication, leg swelling and PND.   Gastrointestinal: Negative for abdominal pain, heartburn, nausea and vomiting.   Musculoskeletal: Negative for back pain, falls, joint pain, myalgias and neck pain.   Neurological: Negative for dizziness, speech change, focal weakness, loss of consciousness, weakness and headaches.   Endo/Heme/Allergies: Does not bruise/bleed easily.   Psychiatric/Behavioral: Negative for depression, memory loss and substance abuse. The patient is not nervous/anxious.        PHYSICAL EXAM:     Vitals:    06/29/22 1306   BP: (!) 160/89   Pulse: 71    Body mass index is 31.79 kg/m².  Weight: 84 kg (185 lb 3 oz)         Physical Exam  Constitutional:       General: She is not in acute distress.     Appearance: She is well-developed. She is not diaphoretic.   HENT:      Head: Normocephalic.   Neck:      Vascular: No carotid bruit or JVD.   Cardiovascular:      Rate and Rhythm: Normal rate and regular rhythm.      Pulses: Normal pulses.      Heart sounds: Murmur heard.    Systolic murmur is present with a grade of 2/6.  Pulmonary:      Effort: Pulmonary effort is normal.      Breath sounds: Normal breath sounds.   Abdominal:       General: Bowel sounds are normal.      Palpations: Abdomen is soft.      Tenderness: There is no abdominal tenderness.   Skin:     General: Skin is warm and dry.   Neurological:      Mental Status: She is alert and oriented to person, place, and time.   Psychiatric:         Speech: Speech normal.         Behavior: Behavior normal.         Thought Content: Thought content normal.         DATA:   EKG: (personally reviewed tracing)  06/28/2022-sinus rhythm with sinus arrhythmia.  Nonspecific T-wave abnormality anterolaterally with the PACs.   Laboratory:  CBC:  Recent Labs   Lab 01/06/22  1129 02/11/22  1042 06/24/22  0742   WBC 4.46 5.15 4.92   Hemoglobin 12.1 12.2 12.3   Hematocrit 35.9 L 35.9 L 37.2   Platelets 160 183 210       CHEMISTRIES:  Recent Labs   Lab 01/06/22  1129 02/11/22  1042 06/24/22  0742   Glucose 87 99 96   Sodium 137 141 141   Potassium 3.3 L 3.8 4.3   BUN 13 13 14   Creatinine 1.1 1.2 1.3   eGFR if African American >60.0 56 A 50.5 A   eGFR if non  53.9 A 49 A 43.8 A   Calcium 9.4 9.5 9.6   Magnesium  --  2.0  --        CARDIAC BIOMARKERS:        COAGS:  Recent Labs   Lab 06/24/22  0742   INR 1.1       LIPIDS/LFTS:  Recent Labs   Lab 10/15/19  0844 10/09/20  1042 06/24/22  0742   Cholesterol  --  163 174   Triglycerides  --  58 105   HDL  --  57 52   LDL Cholesterol  --  94.4 101.0   Non-HDL Cholesterol  --  106 122   AST 22 21 26   ALT 22 20 30       Cardiovascular Testing:      ASSESSMENT:     1. Preoperative cardiovascular evaluation  2. Abnormal EKG  3. Hypertension  4. Class 1 obesity   5. [place]    PLAN:     1. Preoperative cardiovascular evaluation: No symptoms suggestive of angina. No heart failure. No history of arrhythmias. No know valvular heart disease. T wave abnormality is with PACs. Normal beat preceding shows no abnormality. Feel she can proceed at low risk. Will check 2d echocardiogram to assess structure and function as well as to evaluate murmur.  2. Abnormal EKG:  as above.  3. Hypertension: continue current management.  4. Return to clinic 2 weeks.            Shade Patel MD, MPH, FACC, [facility]

## 2022-07-08 ENCOUNTER — HOSPITAL ENCOUNTER (OUTPATIENT)
Dept: CARDIOLOGY | Facility: HOSPITAL | Age: 62
Discharge: HOME | End: 2022-07-08
Attending: INTERNAL MEDICINE
Payer: MEDICARE

## 2022-07-08 DIAGNOSIS — R94.31 NONSPECIFIC ABNORMAL ELECTROCARDIOGRAM (ECG) (EKG): ICD-10-CM

## 2022-07-08 DIAGNOSIS — I49.8 SINUS ARRHYTHMIA SEEN ON ELECTROCARDIOGRAPHY: ICD-10-CM

## 2022-07-08 DIAGNOSIS — I10 PRIMARY HYPERTENSION: ICD-10-CM

## 2022-07-08 DIAGNOSIS — Z01.810 PREOPERATIVE CARDIOVASCULAR EXAMINATION: ICD-10-CM

## 2022-07-08 LAB
ASCENDING AORTA: 3.68 CM
AV INDEX (PROSTH): 0.75
AV MEAN GRADIENT: 4 MMHG
AV PEAK GRADIENT: 7 MMHG
AV VALVE AREA: 3.39 CM2
AV VELOCITY RATIO: 0.68
CV ECHO LV RWT: 0.39 CM
DOP CALC AO PEAK VEL: 1.29 M/S
DOP CALC AO VTI: 25.35 CM
DOP CALC LVOT AREA: 4.5 CM2
DOP CALC LVOT DIAMETER: 2.4 CM
DOP CALC LVOT PEAK VEL: 0.88 M/S
DOP CALC LVOT STROKE VOLUME: 85.96 CM3
DOP CALCLVOT PEAK VEL VTI: 19.01 CM
E WAVE DECELERATION TIME: 189.95 MSEC
E/A RATIO: 0.86
E/E' RATIO: 8.27 M/S
ECHO LV POSTERIOR WALL: 1.07 CM (ref 0.6–1.1)
EJECTION FRACTION: 60 %
FRACTIONAL SHORTENING: 40 % (ref 28–44)
INTERVENTRICULAR SEPTUM: 1.32 CM (ref 0.6–1.1)
IVRT: 161.48 MSEC
LA MAJOR: 6.43 CM
LA MINOR: 6.15 CM
LA WIDTH: 4.8 CM
LEFT ATRIUM SIZE: 5.45 CM
LEFT ATRIUM VOLUME: 139.8 CM3
LEFT INTERNAL DIMENSION IN SYSTOLE: 3.33 CM (ref 2.1–4)
LEFT VENTRICLE DIASTOLIC VOLUME: 149.41 ML
LEFT VENTRICLE SYSTOLIC VOLUME: 45.14 ML
LEFT VENTRICULAR INTERNAL DIMENSION IN DIASTOLE: 5.53 CM (ref 3.5–6)
LEFT VENTRICULAR MASS: 273.23 G
LV LATERAL E/E' RATIO: 6.2 M/S
LV SEPTAL E/E' RATIO: 12.4 M/S
MV PEAK A VEL: 0.72 M/S
MV PEAK E VEL: 0.62 M/S
MV STENOSIS PRESSURE HALF TIME: 55.09 MS
MV VALVE AREA P 1/2 METHOD: 3.99 CM2
PISA TR MAX VEL: 2.33 M/S
PULM VEIN S/D RATIO: 1.65
PV PEAK D VEL: 0.43 M/S
PV PEAK S VEL: 0.71 M/S
PV PEAK VELOCITY: 1.09 CM/S
RA MAJOR: 5.19 CM
RA PRESSURE: 3 MMHG
RA WIDTH: 4.75 CM
RIGHT VENTRICULAR END-DIASTOLIC DIMENSION: 3.67 CM
RV TISSUE DOPPLER FREE WALL SYSTOLIC VELOCITY 1 (APICAL 4 CHAMBER VIEW): 12.97 CM/S
SINUS: 4.07 CM
STJ: 3.02 CM
TDI LATERAL: 0.1 M/S
TDI SEPTAL: 0.05 M/S
TDI: 0.08 M/S
TR MAX PG: 22 MMHG
TRICUSPID ANNULAR PLANE SYSTOLIC EXCURSION: 1.95 CM
TV REST PULMONARY ARTERY PRESSURE: 25 MMHG

## 2022-07-08 PROCEDURE — 93306 TTE W/DOPPLER COMPLETE: CPT | Mod: 26,,, | Performed by: INTERNAL MEDICINE

## 2022-07-08 PROCEDURE — 93306 TTE W/DOPPLER COMPLETE: CPT

## 2022-07-08 PROCEDURE — 93306 ECHO (CUPID ONLY): ICD-10-PCS | Mod: 26,,, | Performed by: INTERNAL MEDICINE

## 2022-07-11 ENCOUNTER — HOSPITAL ENCOUNTER (OUTPATIENT)
Dept: PREADMISSION TESTING | Facility: OTHER | Age: 62
Discharge: HOME | End: 2022-07-11
Attending: OBSTETRICS & GYNECOLOGY
Payer: MEDICARE

## 2022-07-11 VITALS
BODY MASS INDEX: 31.76 KG/M2 | DIASTOLIC BLOOD PRESSURE: 80 MMHG | HEART RATE: 88 BPM | TEMPERATURE: 98 F | WEIGHT: 185 LBS | OXYGEN SATURATION: 97 % | SYSTOLIC BLOOD PRESSURE: 135 MMHG

## 2022-07-11 DIAGNOSIS — Z01.818 PREOP TESTING: Primary | ICD-10-CM

## 2022-07-11 LAB
ABO + RH BLD: NORMAL
BLD GP AB SCN CELLS X3 SERPL QL: NORMAL

## 2022-07-11 PROCEDURE — 86901 BLOOD TYPING SEROLOGIC RH(D): CPT | Performed by: ANESTHESIOLOGY

## 2022-07-11 PROCEDURE — 36415 COLL VENOUS BLD VENIPUNCTURE: CPT | Performed by: ANESTHESIOLOGY

## 2022-07-11 RX ORDER — SODIUM CHLORIDE, SODIUM LACTATE, POTASSIUM CHLORIDE, CALCIUM CHLORIDE 600; 310; 30; 20 MG/100ML; MG/100ML; MG/100ML; MG/100ML
INJECTION, SOLUTION INTRAVENOUS CONTINUOUS
Status: CANCELLED | OUTPATIENT
Start: 2022-07-11

## 2022-07-11 RX ORDER — LIDOCAINE HYDROCHLORIDE 10 MG/ML
0.5 INJECTION, SOLUTION EPIDURAL; INFILTRATION; INTRACAUDAL; PERINEURAL ONCE
Status: CANCELLED | OUTPATIENT
Start: 2022-07-11 | End: 2022-07-11

## 2022-07-11 NOTE — DISCHARGE INSTRUCTIONS
Information to Prepare you for your Surgery    PRE-ADMIT TESTING -  [phone]    [address] [facility]          Your surgery has been scheduled at [facility]. We are pleased to have the opportunity to serve you. For Further Information please call [phone].    On the day of surgery please report to the Information Desk on the 1st floor.    CONTACT YOUR PHYSICIAN'S OFFICE THE DAY PRIOR TO YOUR SURGERY TO OBTAIN YOUR ARRIVAL TIME.     The evening before surgery do not eat anything after 9 p.m. ( this includes hard candy, chewing gum and mints).  You may only have GATORADE, POWERADE AND WATER  from 9 p.m. until you leave your home.   DO NOT DRINK ANY LIQUIDS ON THE WAY TO THE HOSPITAL.      Why does your anesthesiologist allow you to drink Gatorade/Powerade before surgery?  Gatorade/Powerade helps to increase your comfort before surgery and to decrease your nausea after surgery. The carbohydrates in Gatorade/Powerade help reduce your body's stress response to surgery.  If you are a diabetic-drink only water prior to surgery.      Current Visitor policy(12/27/2021) - Patients may have 2 visitors pre and post procedure. Only 2 visitors will be allowed in the Surgical building with the patient.     SPECIAL MEDICATION INSTRUCTIONS: TAKE medications checked off by the Anesthesiologist on your Medication List.    Angiogram Patients: Take medications as instructed by your physician, including aspirin.     Surgery Patients:    If you take ASPIRIN - Your PHYSICIAN/SURGEON will need to inform you IF/OR when you need to stop taking aspirin prior to your surgery.     Do Not take any medications containing IBUPROFEN.    Do Not Wear any make-up (especially eye make-up) to surgery. Please remove any false eyelashes or eyelash extensions. If you arrive the day of surgery with makeup/eyelashes on you will be required to remove prior to surgery. (There is a risk of corneal  abrasions if eye makeup/eyelash extensions are not removed)      Leave all valuables at home.   Do Not wear any jewelry or watches, including any metal in body piercings. Jewelry must be removed prior to coming to the hospital.  There is a possibility that rings that are unable to be removed may be cut off if they are on the surgical extremity.    Please remove all hair extensions, wigs, clips and any other metal accessories/ ornaments from your hair.  These items may pose a flammable/fire risk in Surgery and must be removed.    Do not shave your surgical area at least 5 days prior to your surgery. The surgical prep will be performed at the hospital according to Infection Control regulations.    Contact Lens must be removed before surgery. Either do not wear the contact lens or bring a case and solution for storage.  Please bring a container for eyeglasses or dentures as required.  Bring any paperwork your physician has provided, such as consent forms,  history and physicals, doctor's orders, etc.   Bring comfortable clothes that are loose fitting to wear upon discharge. Take into consideration the type of surgery being performed.  Maintain your diet as advised per your physician the day prior to surgery.      Adequate rest the night before surgery is advised.   Park in the Parking lot behind the hospital or in the [employer] [facility] across the street from the parking lot. Parking is complimentary.  If you will be discharged the same day as your procedure, please arrange for a responsible adult to drive you home or to accompany you if traveling by taxi.   YOU WILL NOT BE PERMITTED TO DRIVE OR TO LEAVE THE HOSPITAL ALONE AFTER SURGERY.   If you are being discharged the same day, it is strongly recommended that you arrange for someone to remain with you for the first 24 hrs following your surgery.    The Surgeon will speak to your family/visitor after your surgery regarding the outcome of your surgery and post op  care.  The Surgeon may speak to you after your surgery, but there is a possibility you may not remember the details.  Please check with your family members regarding the conversation with the Surgeon.    We strongly recommend whoever is bringing you home be present for discharge instructions.  This will ensure a thorough understanding for your post op home care.    ALL CHILDREN MUST ALWAYS BE ACCOMPANIED BY AN ADULT.    Visitors-Refer to current Visitor policy handouts.    Thank you for your cooperation.  The Staff of [facility].            Bathing Instructions with Hibiclens    Shower the evening before and morning of your procedure with Hibiclens:  Wash your face with water and your regular face wash/soap  Apply Hibiclens directly on your skin or on a wet washcloth and wash gently. When showering: Move away from the shower stream when applying Hibiclens to avoid rinsing off too soon.  Rinse thoroughly with warm water  Do not dilute Hibiclens        Dry off as usual, do not use any deodorant, powder, body lotions, perfume, after shave or cologne.

## 2022-07-18 ENCOUNTER — TELEPHONE (OUTPATIENT)
Dept: UROGYNECOLOGY | Facility: CLINIC | Age: 62
End: 2022-07-18
Payer: MEDICARE

## 2022-07-18 NOTE — TELEPHONE ENCOUNTER
Called patient to inform that we will have to move her surgery to [place] due to they can not do it at [facility]. States she does not know where [place] is  And that she has to depend on her son  to bring her, that he works at [employer]. Is stating that it needs to be in the [facility] system and Dr Cabrales needs to speak to her primary care and her ob gyn. Hard to understand, but  will tell Dr Cabrales . Not sure if patient understood, please samantha her. States it is too difficult to get to [place] .

## 2022-07-18 NOTE — TELEPHONE ENCOUNTER
Left  Message for a return  Call. ( need to let pt know that they can't do surgery at [facility] have to move to [place]).

## 2022-07-21 ENCOUNTER — OFFICE VISIT (OUTPATIENT)
Dept: CARDIOLOGY | Facility: CLINIC | Age: 62
End: 2022-07-21
Payer: MEDICARE

## 2022-07-21 VITALS
HEART RATE: 80 BPM | SYSTOLIC BLOOD PRESSURE: 136 MMHG | WEIGHT: 182.88 LBS | OXYGEN SATURATION: 99 % | DIASTOLIC BLOOD PRESSURE: 76 MMHG | BODY MASS INDEX: 31.39 KG/M2 | RESPIRATION RATE: 16 BRPM

## 2022-07-21 DIAGNOSIS — I49.8 SINUS ARRHYTHMIA SEEN ON ELECTROCARDIOGRAPHY: ICD-10-CM

## 2022-07-21 DIAGNOSIS — R94.31 NONSPECIFIC ABNORMAL ELECTROCARDIOGRAM (ECG) (EKG): ICD-10-CM

## 2022-07-21 DIAGNOSIS — Z01.810 PREOPERATIVE CARDIOVASCULAR EXAMINATION: Primary | ICD-10-CM

## 2022-07-21 DIAGNOSIS — I10 PRIMARY HYPERTENSION: ICD-10-CM

## 2022-07-21 DIAGNOSIS — E66.09 CLASS 1 OBESITY DUE TO EXCESS CALORIES WITH SERIOUS COMORBIDITY AND BODY MASS INDEX (BMI) OF 31.0 TO 31.9 IN ADULT: ICD-10-CM

## 2022-07-21 PROCEDURE — 99999 PR PBB SHADOW E&M-EST. PATIENT-LVL IV: ICD-10-PCS | Mod: PBBFAC,,, | Performed by: INTERNAL MEDICINE

## 2022-07-21 PROCEDURE — 3008F BODY MASS INDEX DOCD: CPT | Mod: CPTII,S$GLB,, | Performed by: INTERNAL MEDICINE

## 2022-07-21 PROCEDURE — 3075F SYST BP GE 130 - 139MM HG: CPT | Mod: CPTII,S$GLB,, | Performed by: INTERNAL MEDICINE

## 2022-07-21 PROCEDURE — 3044F HG A1C LEVEL LT 7.0%: CPT | Mod: CPTII,S$GLB,, | Performed by: INTERNAL MEDICINE

## 2022-07-21 PROCEDURE — 1160F PR REVIEW ALL MEDS BY PRESCRIBER/CLIN PHARMACIST DOCUMENTED: ICD-10-PCS | Mod: CPTII,S$GLB,, | Performed by: INTERNAL MEDICINE

## 2022-07-21 PROCEDURE — 99999 PR PBB SHADOW E&M-EST. PATIENT-LVL IV: CPT | Mod: PBBFAC,,, | Performed by: INTERNAL MEDICINE

## 2022-07-21 PROCEDURE — 3078F DIAST BP <80 MM HG: CPT | Mod: CPTII,S$GLB,, | Performed by: INTERNAL MEDICINE

## 2022-07-21 PROCEDURE — 3066F NEPHROPATHY DOC TX: CPT | Mod: CPTII,S$GLB,, | Performed by: INTERNAL MEDICINE

## 2022-07-21 PROCEDURE — 1160F RVW MEDS BY RX/DR IN RCRD: CPT | Mod: CPTII,S$GLB,, | Performed by: INTERNAL MEDICINE

## 2022-07-21 PROCEDURE — 3075F PR MOST RECENT SYSTOLIC BLOOD PRESS GE 130-139MM HG: ICD-10-PCS | Mod: CPTII,S$GLB,, | Performed by: INTERNAL MEDICINE

## 2022-07-21 PROCEDURE — 3078F PR MOST RECENT DIASTOLIC BLOOD PRESSURE < 80 MM HG: ICD-10-PCS | Mod: CPTII,S$GLB,, | Performed by: INTERNAL MEDICINE

## 2022-07-21 PROCEDURE — 99214 OFFICE O/P EST MOD 30 MIN: CPT | Mod: S$GLB,,, | Performed by: INTERNAL MEDICINE

## 2022-07-21 PROCEDURE — 1159F PR MEDICATION LIST DOCUMENTED IN MEDICAL RECORD: ICD-10-PCS | Mod: CPTII,S$GLB,, | Performed by: INTERNAL MEDICINE

## 2022-07-21 PROCEDURE — 1159F MED LIST DOCD IN RCRD: CPT | Mod: CPTII,S$GLB,, | Performed by: INTERNAL MEDICINE

## 2022-07-21 PROCEDURE — 3066F PR DOCUMENTATION OF TREATMENT FOR NEPHROPATHY: ICD-10-PCS | Mod: CPTII,S$GLB,, | Performed by: INTERNAL MEDICINE

## 2022-07-21 PROCEDURE — 3008F PR BODY MASS INDEX (BMI) DOCUMENTED: ICD-10-PCS | Mod: CPTII,S$GLB,, | Performed by: INTERNAL MEDICINE

## 2022-07-21 PROCEDURE — 99214 PR OFFICE/OUTPT VISIT, EST, LEVL IV, 30-39 MIN: ICD-10-PCS | Mod: S$GLB,,, | Performed by: INTERNAL MEDICINE

## 2022-07-21 PROCEDURE — 3044F PR MOST RECENT HEMOGLOBIN A1C LEVEL <7.0%: ICD-10-PCS | Mod: CPTII,S$GLB,, | Performed by: INTERNAL MEDICINE

## 2022-07-21 NOTE — PROGRESS NOTES
CARDIOLOGY CLINIC VISIT        HISTORY OF PRESENT ILLNESS:     Omid Rascon presents for continued care. Seen 06/29/2022 for preoperative cardiovascular evaluation prior to surgery for cystocele with prolapse.  Seen was seen by  her primary care physician on 6/28/2022.  EKG showed sinus rhythm with sinus arrhythmia.  Nonspecific T-wave abnormality anterolaterally with the PACs. Prior EKG is from 2016 that showed normal sinus rhythm. History of hypertension. Denies history of coronary artery disease. No need for prior evaluation.    07/21/2022:  Echocardiogram shows normal left ventricular systolic function with estimated ejection fraction of 60%.  No pulmonary hypertension.  No significant valvular abnormalities.  Patient's surgery has been postponed.    CARDIOVASCULAR HISTORY:     None    PAST MEDICAL HISTORY:     Past Medical History:   Diagnosis Date    Anemia     CKD (chronic kidney disease), stage II 5/16/2016    Depression     Hemoglobin C trait     Hypertension     no meds    Impaired hearing     Lumbago 8/10/2015    Monoclonal gammopathy 11/10/2016    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    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       PAST SURGICAL HISTORY:     Past Surgical History:   Procedure Laterality Date    ABLATION  2008    LASER ABLATION OF THE CERVIX         ALLERGIES AND MEDICATION:     Review of patient's allergies indicates:   Allergen Reactions    Lactose     Sulfa (sulfonamide antibiotics) Swelling    Latex, natural rubber Rash    Shellfish containing products Rash    Strawberries [strawberry] Rash        Medication List          Accurate as of July 21, 2022  1:38 PM. If you have any questions, ask your nurse or doctor.            CONTINUE taking these medications    cetirizine 10 MG tablet  Commonly known as: ZYRTEC  Take 1 tablet (10 mg total) by mouth daily as needed for Allergies or Rhinitis.     tiZANidine 2 MG tablet  Commonly known as: ZANAFLEX  Take 1-2 tabs PO QHS  PRN muscle pain/spasms            SOCIAL HISTORY:     Social History     Socioeconomic History    Marital status: Other   Tobacco Use    Smoking status: Never Smoker    Smokeless tobacco: Never Used   Substance and Sexual Activity    Alcohol use: No    Drug use: No    Sexual activity: Yes     Partners: Male     Birth control/protection: See Surgical Hx     Social Determinants of Health     Financial Resource Strain: Low Risk     Difficulty of Paying Living Expenses: Not very hard   Food Insecurity: No Food Insecurity    Worried About Running Out of Food in the Last Year: Never true    Ran Out of Food in the Last Year: Never true   Transportation Needs: No Transportation Needs    Lack of Transportation (Medical): No    Lack of Transportation (Non-Medical): No   Physical Activity: Sufficiently Active    Days of Exercise per Week: 7 days    Minutes of Exercise per Session: 90 min   Stress: No Stress Concern Present    Feeling of Stress : Not at all   Social Connections: Unknown    Frequency of Communication with Friends and Family: More than three times a week    Frequency of Social Gatherings with Friends and Family: More than three times a week    Active Member of Clubs or Organizations: Yes    Attends Club or Organization Meetings: More than 4 times per year    Marital Status: Patient refused   Housing Stability: Low Risk     Unable to Pay for Housing in the Last Year: No    Number of Places Lived in the Last Year: 1    Unstable Housing in the Last Year: No       FAMILY HISTORY:     Family History   Problem Relation Age of Onset    Stroke Father     Heart failure Mother     No Known Problems Sister     No Known Problems Brother     No Known Problems Brother     No Known Problems Brother     No Known Problems Sister     No Known Problems Sister     No Known Problems Sister     No Known Problems Brother     No Known Problems Sister     Diabetes Sister     No Known Problems Maternal  Aunt     No Known Problems Maternal Uncle     No Known Problems Paternal Aunt     No Known Problems Paternal Uncle     No Known Problems Maternal Grandmother     No Known Problems Maternal Grandfather     No Known Problems Paternal Grandmother     No Known Problems Paternal Grandfather     Amblyopia Neg Hx     Blindness Neg Hx     Cancer Neg Hx     Cataracts Neg Hx     Glaucoma Neg Hx     Hypertension Neg Hx     Macular degeneration Neg Hx     Retinal detachment Neg Hx     Strabismus Neg Hx     Thyroid disease Neg Hx     Breast cancer Neg Hx     Colon cancer Neg Hx     Ovarian cancer Neg Hx        REVIEW OF SYSTEMS:   Review of Systems   Constitutional: Negative for chills, diaphoresis, fever, malaise/fatigue and weight loss.   Eyes: Negative for blurred vision and pain.   Respiratory: Negative for sputum production, shortness of breath and wheezing.    Cardiovascular: Negative for chest pain, palpitations, orthopnea, claudication, leg swelling and PND.   Gastrointestinal: Negative for abdominal pain, heartburn, nausea and vomiting.   Musculoskeletal: Negative for back pain, falls, joint pain, myalgias and neck pain.   Neurological: Negative for dizziness, speech change, focal weakness, loss of consciousness, weakness and headaches.   Endo/Heme/Allergies: Does not bruise/bleed easily.   Psychiatric/Behavioral: Negative for depression, memory loss and substance abuse. The patient is not nervous/anxious.        PHYSICAL EXAM:     Vitals:    07/21/22 1307   BP: 136/76   Pulse: 80   Resp: 16    Body mass index is 31.39 kg/m².  Weight: 83 kg (182 lb 14 oz)         Physical Exam  Constitutional:       General: She is not in acute distress.     Appearance: She is well-developed. She is not diaphoretic.   HENT:      Head: Normocephalic.   Neck:      Vascular: No carotid bruit or JVD.   Cardiovascular:      Rate and Rhythm: Normal rate and regular rhythm.      Pulses: Normal pulses.      Heart sounds:  Murmur heard.    Systolic murmur is present with a grade of 2/6.  Pulmonary:      Effort: Pulmonary effort is normal.      Breath sounds: Normal breath sounds.   Abdominal:      General: Bowel sounds are normal.      Palpations: Abdomen is soft.      Tenderness: There is no abdominal tenderness.   Skin:     General: Skin is warm and dry.   Neurological:      Mental Status: She is alert and oriented to person, place, and time.   Psychiatric:         Speech: Speech normal.         Behavior: Behavior normal.         Thought Content: Thought content normal.         DATA:   EKG: (personally reviewed tracing)  06/28/2022-sinus rhythm with sinus arrhythmia.  Nonspecific T-wave abnormality anterolaterally with the PACs.   Laboratory:  CBC:  Recent Labs   Lab 01/06/22  1129 02/11/22  1042 06/24/22  0742   WBC 4.46 5.15 4.92   Hemoglobin 12.1 12.2 12.3   Hematocrit 35.9 L 35.9 L 37.2   Platelets 160 183 210       CHEMISTRIES:  Recent Labs   Lab 01/06/22  1129 02/11/22  1042 06/24/22  0742   Glucose 87 99 96   Sodium 137 141 141   Potassium 3.3 L 3.8 4.3   BUN 13 13 14   Creatinine 1.1 1.2 1.3   eGFR if African American >60.0 56 A 50.5 A   eGFR if non  53.9 A 49 A 43.8 A   Calcium 9.4 9.5 9.6   Magnesium  --  2.0  --        CARDIAC BIOMARKERS:        COAGS:  Recent Labs   Lab 06/24/22  0742   INR 1.1       LIPIDS/LFTS:  Recent Labs   Lab 10/15/19  0844 10/09/20  1042 06/24/22  0742   Cholesterol  --  163 174   Triglycerides  --  58 105   HDL  --  57 52   LDL Cholesterol  --  94.4 101.0   Non-HDL Cholesterol  --  106 122   AST 22 21 26   ALT 22 20 30       Cardiovascular Testing:    Echocardiogram 07/08/2022:    · The left ventricle is normal in size with eccentric hypertrophy and normal systolic function.  · The estimated ejection fraction is 60%.  · Normal left ventricular diastolic function.  · Normal right ventricular size with normal right ventricular systolic function.  · Moderate left atrial  enlargement.  · Normal central venous pressure (3 mmHg).  · The estimated PA systolic pressure is 25 mmHg.  · There is no pulmonary hypertension.         ASSESSMENT:     1. Preoperative cardiovascular evaluation  2. Abnormal EKG  3. Hypertension  4. Class 1 obesity   5. [place]    PLAN:     1. Preoperative cardiovascular evaluation: No symptoms suggestive of angina. No heart failure. No history of arrhythmias. No known valvular heart disease. T wave abnormality is with PACs. Normal beat preceding shows no abnormality. She can proceed at low risk.   2. Abnormal EKG: as above.  3. Hypertension: continue current management.  4. Return to clinic PRN.           Shade Patel MD, MPH, FACC, [facility]

## 2022-08-02 ENCOUNTER — OFFICE VISIT (OUTPATIENT)
Dept: OPTOMETRY | Facility: CLINIC | Age: 62
End: 2022-08-02
Payer: MEDICARE

## 2022-08-02 DIAGNOSIS — H52.7 REFRACTIVE ERROR: ICD-10-CM

## 2022-08-02 DIAGNOSIS — H25.13 NUCLEAR SCLEROSIS OF BOTH EYES: Primary | ICD-10-CM

## 2022-08-02 PROCEDURE — 1160F RVW MEDS BY RX/DR IN RCRD: CPT | Mod: CPTII,S$GLB,, | Performed by: OPTOMETRIST

## 2022-08-02 PROCEDURE — 3066F NEPHROPATHY DOC TX: CPT | Mod: CPTII,S$GLB,, | Performed by: OPTOMETRIST

## 2022-08-02 PROCEDURE — 99999 PR PBB SHADOW E&M-EST. PATIENT-LVL II: CPT | Mod: PBBFAC,,, | Performed by: OPTOMETRIST

## 2022-08-02 PROCEDURE — 3066F PR DOCUMENTATION OF TREATMENT FOR NEPHROPATHY: ICD-10-PCS | Mod: CPTII,S$GLB,, | Performed by: OPTOMETRIST

## 2022-08-02 PROCEDURE — 92015 PR REFRACTION: ICD-10-PCS | Mod: S$GLB,,, | Performed by: OPTOMETRIST

## 2022-08-02 PROCEDURE — 92015 DETERMINE REFRACTIVE STATE: CPT | Mod: S$GLB,,, | Performed by: OPTOMETRIST

## 2022-08-02 PROCEDURE — 1159F MED LIST DOCD IN RCRD: CPT | Mod: CPTII,S$GLB,, | Performed by: OPTOMETRIST

## 2022-08-02 PROCEDURE — 92014 PR EYE EXAM, EST PATIENT,COMPREHESV: ICD-10-PCS | Mod: S$GLB,,, | Performed by: OPTOMETRIST

## 2022-08-02 PROCEDURE — 3044F HG A1C LEVEL LT 7.0%: CPT | Mod: CPTII,S$GLB,, | Performed by: OPTOMETRIST

## 2022-08-02 PROCEDURE — 1160F PR REVIEW ALL MEDS BY PRESCRIBER/CLIN PHARMACIST DOCUMENTED: ICD-10-PCS | Mod: CPTII,S$GLB,, | Performed by: OPTOMETRIST

## 2022-08-02 PROCEDURE — 99999 PR PBB SHADOW E&M-EST. PATIENT-LVL II: ICD-10-PCS | Mod: PBBFAC,,, | Performed by: OPTOMETRIST

## 2022-08-02 PROCEDURE — 1159F PR MEDICATION LIST DOCUMENTED IN MEDICAL RECORD: ICD-10-PCS | Mod: CPTII,S$GLB,, | Performed by: OPTOMETRIST

## 2022-08-02 PROCEDURE — 92014 COMPRE OPH EXAM EST PT 1/>: CPT | Mod: S$GLB,,, | Performed by: OPTOMETRIST

## 2022-08-02 PROCEDURE — 3044F PR MOST RECENT HEMOGLOBIN A1C LEVEL <7.0%: ICD-10-PCS | Mod: CPTII,S$GLB,, | Performed by: OPTOMETRIST

## 2022-08-02 NOTE — PROGRESS NOTES
Subjective:       Patient ID: Omid Rascon is a 62 y.o. female      Chief Complaint   Patient presents with    Concerns About Ocular Health     History of Present Illness  Dls: 8/2/21 Dr. Sandoval     63 y/o female presents today for ocular health check.  Pt states no changes in vision. Pt wears pal's.     No tearing  No itching  No burning  No pain  No ha's  No floaters  No flashes    Eye meds  None      Assessment/Plan:     1. Nuclear sclerosis of both eyes  Educated pt on presence of cataracts and effects on vision. No surgery at this time. Recheck in one year, sooner PRN.    2. Refractive error  Educated patient on refractive error and discussed lens options. Dispensed updated spectacle Rx. Educated about adaptation period to new specs.    Eyeglass Final Rx     Eyeglass Final Rx       Sphere Cylinder Axis Add    Right +1.25 +0.75 020 +2.50    Left +1.50 +0.50 165 +2.50    Expiration Date: 8/2/2023                  Follow up in about 1 year (around 8/2/2023).

## 2022-08-17 ENCOUNTER — HOSPITAL ENCOUNTER (OUTPATIENT)
Dept: RADIOLOGY | Facility: HOSPITAL | Age: 62
Discharge: HOME | End: 2022-08-17
Attending: OBSTETRICS & GYNECOLOGY
Payer: MEDICARE

## 2022-08-17 DIAGNOSIS — Z12.31 ENCOUNTER FOR SCREENING MAMMOGRAM FOR MALIGNANT NEOPLASM OF BREAST: ICD-10-CM

## 2022-08-17 PROCEDURE — 77063 BREAST TOMOSYNTHESIS BI: CPT | Mod: TC,PO

## 2022-08-17 PROCEDURE — 77067 MAMMO DIGITAL SCREENING BILAT WITH TOMO: ICD-10-PCS | Mod: 26,,, | Performed by: RADIOLOGY

## 2022-08-17 PROCEDURE — 77063 MAMMO DIGITAL SCREENING BILAT WITH TOMO: ICD-10-PCS | Mod: 26,,, | Performed by: RADIOLOGY

## 2022-08-17 PROCEDURE — 77063 BREAST TOMOSYNTHESIS BI: CPT | Mod: 26,,, | Performed by: RADIOLOGY

## 2022-08-17 PROCEDURE — 77067 SCR MAMMO BI INCL CAD: CPT | Mod: 26,,, | Performed by: RADIOLOGY

## 2022-08-24 ENCOUNTER — TELEPHONE (OUTPATIENT)
Dept: ENDOSCOPY | Facility: HOSPITAL | Age: 62
End: 2022-08-24
Payer: MEDICARE

## 2022-08-24 DIAGNOSIS — Z12.11 SPECIAL SCREENING FOR MALIGNANT NEOPLASMS, COLON: Primary | ICD-10-CM

## 2022-08-24 RX ORDER — POLYETHYLENE GLYCOL 3350, SODIUM SULFATE ANHYDROUS, SODIUM BICARBONATE, SODIUM CHLORIDE, POTASSIUM CHLORIDE 236; 22.74; 6.74; 5.86; 2.97 G/4L; G/4L; G/4L; G/4L; G/4L
4 POWDER, FOR SOLUTION ORAL ONCE
Qty: 4000 ML | Refills: 0 | Status: SHIPPED | OUTPATIENT
Start: 2022-08-24 | End: 2022-08-24

## 2022-08-24 NOTE — TELEPHONE ENCOUNTER
Called patient to schedule for a colonoscopy. No answer. Left message for patient to call Endoscopy Scheduling to schedule. Phone number provided. Reminder letter mailed.

## 2022-09-13 ENCOUNTER — PATIENT MESSAGE (OUTPATIENT)
Dept: ENDOSCOPY | Facility: HOSPITAL | Age: 62
End: 2022-09-13
Payer: MEDICARE

## 2022-09-13 NOTE — TELEPHONE ENCOUNTER
[facility] Note    Discussed with the patient.  She should be ok to proceed with her prep for her colonoscopy.    Amarilis Chacon MD

## 2022-09-14 ENCOUNTER — HOSPITAL ENCOUNTER (OUTPATIENT)
Facility: HOSPITAL | Age: 62
Discharge: HOME | End: 2022-09-14
Attending: INTERNAL MEDICINE | Admitting: INTERNAL MEDICINE
Payer: MEDICARE

## 2022-09-14 ENCOUNTER — ANESTHESIA (OUTPATIENT)
Dept: ENDOSCOPY | Facility: HOSPITAL | Age: 62
End: 2022-09-14
Payer: MEDICARE

## 2022-09-14 ENCOUNTER — ANESTHESIA EVENT (OUTPATIENT)
Dept: ENDOSCOPY | Facility: HOSPITAL | Age: 62
End: 2022-09-14
Payer: MEDICARE

## 2022-09-14 VITALS
HEART RATE: 73 BPM | SYSTOLIC BLOOD PRESSURE: 161 MMHG | OXYGEN SATURATION: 99 % | DIASTOLIC BLOOD PRESSURE: 89 MMHG | TEMPERATURE: 98 F | RESPIRATION RATE: 18 BRPM

## 2022-09-14 DIAGNOSIS — Z12.11 COLON CANCER SCREENING: ICD-10-CM

## 2022-09-14 DIAGNOSIS — R19.5 POSITIVE FIT (FECAL IMMUNOCHEMICAL TEST): Primary | ICD-10-CM

## 2022-09-14 PROCEDURE — 45380 COLONOSCOPY AND BIOPSY: CPT | Mod: 59,,, | Performed by: INTERNAL MEDICINE

## 2022-09-14 PROCEDURE — D9220A PRA ANESTHESIA: ICD-10-PCS | Mod: ANES,,, | Performed by: ANESTHESIOLOGY

## 2022-09-14 PROCEDURE — 37000009 HC ANESTHESIA EA ADD 15 MINS: Performed by: INTERNAL MEDICINE

## 2022-09-14 PROCEDURE — 45385 COLONOSCOPY W/LESION REMOVAL: CPT | Mod: ,,, | Performed by: INTERNAL MEDICINE

## 2022-09-14 PROCEDURE — 88305 TISSUE EXAM BY PATHOLOGIST: ICD-10-PCS | Mod: 26,,, | Performed by: PATHOLOGY

## 2022-09-14 PROCEDURE — 63600175 PHARM REV CODE 636 W HCPCS: Performed by: STUDENT IN AN ORGANIZED HEALTH CARE EDUCATION/TRAINING PROGRAM

## 2022-09-14 PROCEDURE — D9220A PRA ANESTHESIA: ICD-10-PCS | Mod: CRNA,,, | Performed by: NURSE ANESTHETIST, CERTIFIED REGISTERED

## 2022-09-14 PROCEDURE — 63600175 PHARM REV CODE 636 W HCPCS: Performed by: ANESTHESIOLOGY

## 2022-09-14 PROCEDURE — 45380 COLONOSCOPY AND BIOPSY: CPT | Mod: 59 | Performed by: INTERNAL MEDICINE

## 2022-09-14 PROCEDURE — D9220A PRA ANESTHESIA: Mod: ANES,,, | Performed by: ANESTHESIOLOGY

## 2022-09-14 PROCEDURE — 25000003 PHARM REV CODE 250: Performed by: STUDENT IN AN ORGANIZED HEALTH CARE EDUCATION/TRAINING PROGRAM

## 2022-09-14 PROCEDURE — 27201089 HC SNARE, DISP (ANY): Performed by: INTERNAL MEDICINE

## 2022-09-14 PROCEDURE — 88305 TISSUE EXAM BY PATHOLOGIST: CPT | Mod: 26,,, | Performed by: PATHOLOGY

## 2022-09-14 PROCEDURE — 25000003 PHARM REV CODE 250: Performed by: ANESTHESIOLOGY

## 2022-09-14 PROCEDURE — 27200997: Performed by: INTERNAL MEDICINE

## 2022-09-14 PROCEDURE — 45380 PR COLONOSCOPY,BIOPSY: ICD-10-PCS | Mod: 59,,, | Performed by: INTERNAL MEDICINE

## 2022-09-14 PROCEDURE — 45385 PR COLONOSCOPY,REMV LESN,SNARE: ICD-10-PCS | Mod: ,,, | Performed by: INTERNAL MEDICINE

## 2022-09-14 PROCEDURE — 37000008 HC ANESTHESIA 1ST 15 MINUTES: Performed by: INTERNAL MEDICINE

## 2022-09-14 PROCEDURE — D9220A PRA ANESTHESIA: Mod: CRNA,,, | Performed by: NURSE ANESTHETIST, CERTIFIED REGISTERED

## 2022-09-14 PROCEDURE — 45385 COLONOSCOPY W/LESION REMOVAL: CPT | Performed by: INTERNAL MEDICINE

## 2022-09-14 PROCEDURE — 27201012 HC FORCEPS, HOT/COLD, DISP: Performed by: INTERNAL MEDICINE

## 2022-09-14 PROCEDURE — 88305 TISSUE EXAM BY PATHOLOGIST: CPT | Mod: 59 | Performed by: PATHOLOGY

## 2022-09-14 RX ORDER — MIDAZOLAM HYDROCHLORIDE 1 MG/ML
INJECTION, SOLUTION INTRAMUSCULAR; INTRAVENOUS
Status: DISCONTINUED | OUTPATIENT
Start: 2022-09-14 | End: 2022-09-14

## 2022-09-14 RX ORDER — PROPOFOL 10 MG/ML
VIAL (ML) INTRAVENOUS
Status: DISCONTINUED | OUTPATIENT
Start: 2022-09-14 | End: 2022-09-14

## 2022-09-14 RX ORDER — LABETALOL HYDROCHLORIDE 5 MG/ML
INJECTION, SOLUTION INTRAVENOUS
Status: DISCONTINUED | OUTPATIENT
Start: 2022-09-14 | End: 2022-09-14

## 2022-09-14 RX ORDER — HYDRALAZINE HYDROCHLORIDE 20 MG/ML
INJECTION INTRAMUSCULAR; INTRAVENOUS
Status: DISCONTINUED
Start: 2022-09-14 | End: 2022-09-14 | Stop reason: HOSPADM

## 2022-09-14 RX ORDER — PROPOFOL 10 MG/ML
INJECTION, EMULSION INTRAVENOUS
Status: DISCONTINUED
Start: 2022-09-14 | End: 2022-09-14 | Stop reason: HOSPADM

## 2022-09-14 RX ORDER — LIDOCAINE HYDROCHLORIDE 20 MG/ML
INJECTION INTRAVENOUS
Status: DISCONTINUED | OUTPATIENT
Start: 2022-09-14 | End: 2022-09-14

## 2022-09-14 RX ORDER — LIDOCAINE HYDROCHLORIDE 20 MG/ML
INJECTION, SOLUTION EPIDURAL; INFILTRATION; INTRACAUDAL; PERINEURAL
Status: DISCONTINUED
Start: 2022-09-14 | End: 2022-09-14 | Stop reason: HOSPADM

## 2022-09-14 RX ORDER — MIDAZOLAM HYDROCHLORIDE 1 MG/ML
INJECTION INTRAMUSCULAR; INTRAVENOUS
Status: COMPLETED
Start: 2022-09-14 | End: 2022-09-14

## 2022-09-14 RX ORDER — HYDRALAZINE HYDROCHLORIDE 20 MG/ML
5 INJECTION INTRAMUSCULAR; INTRAVENOUS ONCE
Status: COMPLETED | OUTPATIENT
Start: 2022-09-14 | End: 2022-09-14

## 2022-09-14 RX ORDER — HYDRALAZINE HYDROCHLORIDE 20 MG/ML
10 INJECTION INTRAMUSCULAR; INTRAVENOUS ONCE
Status: COMPLETED | OUTPATIENT
Start: 2022-09-14 | End: 2022-09-14

## 2022-09-14 RX ORDER — LABETALOL HYDROCHLORIDE 5 MG/ML
INJECTION, SOLUTION INTRAVENOUS
Status: COMPLETED
Start: 2022-09-14 | End: 2022-09-14

## 2022-09-14 RX ORDER — SODIUM CHLORIDE 9 MG/ML
INJECTION, SOLUTION INTRAVENOUS CONTINUOUS
Status: DISCONTINUED | OUTPATIENT
Start: 2022-09-14 | End: 2022-09-14 | Stop reason: HOSPADM

## 2022-09-14 RX ADMIN — MIDAZOLAM 2 MG: 1 INJECTION INTRAMUSCULAR; INTRAVENOUS at 09:09

## 2022-09-14 RX ADMIN — PROPOFOL 40 MG: 10 INJECTION, EMULSION INTRAVENOUS at 10:09

## 2022-09-14 RX ADMIN — HYDRALAZINE HYDROCHLORIDE 5 MG: 20 INJECTION, SOLUTION INTRAMUSCULAR; INTRAVENOUS at 11:09

## 2022-09-14 RX ADMIN — LABETALOL HYDROCHLORIDE 10 MG: 5 INJECTION, SOLUTION INTRAVENOUS at 09:09

## 2022-09-14 RX ADMIN — PROPOFOL 30 MG: 10 INJECTION, EMULSION INTRAVENOUS at 10:09

## 2022-09-14 RX ADMIN — LIDOCAINE HYDROCHLORIDE 100 MG: 20 INJECTION, SOLUTION INTRAVENOUS at 09:09

## 2022-09-14 RX ADMIN — LABETALOL HYDROCHLORIDE 5 MG: 5 INJECTION, SOLUTION INTRAVENOUS at 09:09

## 2022-09-14 RX ADMIN — PROPOFOL 20 MG: 10 INJECTION, EMULSION INTRAVENOUS at 09:09

## 2022-09-14 RX ADMIN — PROPOFOL 50 MG: 10 INJECTION, EMULSION INTRAVENOUS at 09:09

## 2022-09-14 RX ADMIN — HYDRALAZINE HYDROCHLORIDE 10 MG: 20 INJECTION, SOLUTION INTRAMUSCULAR; INTRAVENOUS at 12:09

## 2022-09-14 RX ADMIN — LABETALOL HYDROCHLORIDE 5 MG: 5 INJECTION, SOLUTION INTRAVENOUS at 10:09

## 2022-09-14 RX ADMIN — PROPOFOL 20 MG: 10 INJECTION, EMULSION INTRAVENOUS at 10:09

## 2022-09-14 RX ADMIN — SODIUM CHLORIDE: 0.9 INJECTION, SOLUTION INTRAVENOUS at 09:09

## 2022-09-14 RX ADMIN — PROPOFOL 80 MG: 10 INJECTION, EMULSION INTRAVENOUS at 09:09

## 2022-09-14 NOTE — PROVATION PATIENT INSTRUCTIONS
Discharge Summary/Instructions after an Endoscopic Procedure  Patient Name: Omid Rascon  Patient MRN: [id number]  Patient YOB: 1960 Wednesday, September 14, 2022  Amarilis Chacon MD  Dear patient,  As a result of recent federal legislation (The Federal Cures Act), you may   receive lab or pathology results from your procedure in your [facility]   account before your physician is able to contact you. Your physician or   their representative will relay the results to you with their   recommendations at their soonest availability.  Thank you,  RESTRICTIONS:  During your procedure today, you received medications for sedation.  These   medications may affect your judgment, balance and coordination.  Therefore,   for 24 hours, you have the following restrictions:   - DO NOT drive a car, operate machinery, make legal/financial decisions,   sign important papers or drink alcohol.    ACTIVITY:  Today: no heavy lifting, straining or running due to procedural   sedation/anesthesia.  The following day: return to full activity including work.  DIET:  Eat and drink normally unless instructed otherwise.     TREATMENT FOR COMMON SIDE EFFECTS:  - Mild abdominal pain, nausea, belching, bloating or excessive gas:  rest,   eat lightly and use a heating pad.  - Sore Throat: treat with throat lozenges and/or gargle with warm salt   water.  - Because air was used during the procedure, expelling large amounts of air   from your rectum or belching is normal.  - If a bowel prep was taken, you may not have a bowel movement for 1-3 days.    This is normal.  SYMPTOMS TO WATCH FOR AND REPORT TO YOUR PHYSICIAN:  1. Abdominal pain or bloating, other than gas cramps.  2. Chest pain.  3. Back pain.  4. Signs of infection such as: chills or fever occurring within 24 hours   after the procedure.  5. Rectal bleeding, which would show as bright red, maroon, or black stools.   (A tablespoon of blood from the rectum is not serious,  especially if   hemorrhoids are present.)  6. Vomiting.  7. Weakness or dizziness.  GO DIRECTLY TO THE NEAREST EMERGENCY ROOM IF YOU HAVE ANY OF THE FOLLOWING:      Difficulty breathing              Chills and/or fever over 101 F   Persistent vomiting and/or vomiting blood   Severe abdominal pain   Severe chest pain   Black, tarry stools   Bleeding- more than one tablespoon   Any other symptom or condition that you feel may need urgent attention  Your doctor recommends these additional instructions:  If any biopsies were taken, your doctors clinic will contact you in 1 to 2   weeks with any results.  - Discharge patient to home.   - Resume previous diet.   - Continue present medications.   - Await pathology results.   - Repeat colonoscopy in 1 year for surveillance.  For questions, problems or results please call your physician - Amarilis Chacon MD at Work:  [phone].  [facility] can be reached at [phone]     IF A COMPLICATION OR EMERGENCY SITUATION ARISES AND YOU ARE UNABLE TO REACH   YOUR PHYSICIAN - GO DIRECTLY TO THE EMERGENCY ROOM.  Amarilis Chacon MD  9/14/2022 10:54:51 AM  This report has been verified and signed electronically.  Dear patient,  As a result of recent federal legislation (The Federal Cures Act), you may   receive lab or pathology results from your procedure in your [facility]   account before your physician is able to contact you. Your physician or   their representative will relay the results to you with their   recommendations at their soonest availability.  Thank you,  PROVATION

## 2022-09-14 NOTE — ANESTHESIA POSTPROCEDURE EVALUATION
Anesthesia Post Evaluation    Patient: Omid Rascon    Procedure(s) Performed: Procedure(s) (LRB):  COLONOSCOPY (N/A)    Final Anesthesia Type: general      Patient location during evaluation: GI PACU  Patient participation: Yes- Able to Participate  Level of consciousness: awake and alert  Post-procedure vital signs: reviewed and stable  Pain management: adequate  Airway patency: patent    PONV status at discharge: No PONV  Anesthetic complications: no      Cardiovascular status: hemodynamically stable  Respiratory status: unassisted and spontaneous ventilation  Hydration status: euvolemic  Follow-up not needed.      Pt did well during the procedure and recovery. She denied any CP/SOB/HA, N/V. Advised both patient and her son to continue to monitor BP at home, and if she has symptoms such as CP/SOB or if there is any concerns, to go to the nearest ER. They state their understanding.    Vitals Value Taken Time   /89 09/14/22 1229   Temp 36.4 °C (97.6 °F) 09/14/22 1043   Pulse 73 09/14/22 1229   Resp 18 09/14/22 1224   SpO2 99 % 09/14/22 1224         Event Time   Out of Recovery 13:05:50         Pain/Asad Score: Asad Score: 10 (9/14/2022 12:24 PM)

## 2022-09-14 NOTE — TRANSFER OF CARE
Anesthesia Transfer of Care Note    Patient: Omid Rascon    Procedure(s) Performed: Procedure(s) (LRB):  COLONOSCOPY (N/A)    Patient location: GI    Anesthesia Type: MAC    Transport from OR: Transported from OR on room air with adequate spontaneous ventilation    Post pain: adequate analgesia    Post assessment: no apparent anesthetic complications and tolerated procedure well    Post vital signs: stable    Level of consciousness: sedated and responds to stimulation    Nausea/Vomiting: no nausea/vomiting    Complications: none    Transfer of care protocol was followed      Last vitals:   Visit Vitals  BP (!) 180/103 (BP Location: Left arm, Patient Position: Lying)   Pulse 60   Temp 36.4 °C (97.6 °F) (Oral)   Resp 20   SpO2 97%   Breastfeeding No

## 2022-09-14 NOTE — ANESTHESIA PREPROCEDURE EVALUATION
09/14/2022  Omid Rascon is a 62 y.o., female.      Pre-op Assessment    I have reviewed the Patient Summary Reports.     I have reviewed the Nursing Notes.       Review of Systems  Anesthesia Hx:  No problems with previous Anesthesia  Denies Family Hx of Anesthesia complications.   Denies Personal Hx of Anesthesia complications.   Social:  Non-Smoker, No Alcohol Use    Hematology/Oncology:         -- Anemia: Hematology Comments: Hemoglobin C trait     EENT/Dental:   Impaired hearing   Cardiovascular:   Exercise tolerance: good Hypertension Denies MI.   Denies Dysrhythmias.  6/29/2022 Echo: EF 60%; PAP 25    Documented white coat syndrome   Pulmonary:  Pulmonary Normal    Renal/:   Chronic Renal Disease, CRI    Hepatic/GI:  Hepatic/GI Normal    Neurological:  Neurology Normal    Endocrine:  Endocrine Normal    Psych:   Psychiatric History denies anxiety depression          Physical Exam  General: Well nourished, Cooperative, Alert and Oriented    Airway:  Mallampati: II   Mouth Opening: Normal  TM Distance: 4 - 6 cm  Tongue: Normal  Neck ROM: Normal ROM    Dental:  Partial Dentures    Chest/Lungs:  Clear to auscultation, Normal Respiratory Rate    Heart:  Rate: Normal  Rhythm: Regular Rhythm      Wt Readings from Last 3 Encounters:   07/21/22 83 kg (182 lb 14 oz)   07/11/22 83.9 kg (185 lb)   06/29/22 84 kg (185 lb 3 oz)     Temp Readings from Last 3 Encounters:   07/11/22 36.6 °C (97.9 °F) (Oral)   06/28/22 36.6 °C (97.9 °F)   05/20/22 36.7 °C (98.1 °F)     BP Readings from Last 3 Encounters:   07/21/22 136/76   07/11/22 135/80   06/29/22 (!) 160/89     Pulse Readings from Last 3 Encounters:   07/21/22 80   07/11/22 88   06/29/22 71         Anesthesia Plan  Type of Anesthesia, risks & benefits discussed:    Anesthesia Type: Gen Natural Airway, MAC  Intra-op Monitoring Plan: Standard ASA  Monitors  Post Op Pain Control Plan: multimodal analgesia  Induction:  IV  Informed Consent: Informed consent signed with the Patient and all parties understand the risks and agree with anesthesia plan.  All questions answered.   ASA Score: 3  Day of Surgery Review of History & Physical: H&P Update referred to the surgeon/provider.  Anesthesia Plan Notes: Patient with documented white coat syndrome, BP: 202/122, 198/121. Pt denies any CP, SOB, HA, or any other complaints. Digital BP readings at home range [phone]. Gave patient Versed and Labetalol pre-procedure, with improvement of BP to 160s/108.     D/w patient and son to continue to monitor BP at home and to call her doctor/go to nearest ER if she has any concerns. They state their understanding.    Ready For Surgery From Anesthesia Perspective.     .

## 2022-09-14 NOTE — PLAN OF CARE
Procedure and recovery complete. Awake and alert. No c/o pain  or discomfort. Son at bedside. Discharge instructions given. Verbalized understanding. No acute distress noted.

## 2022-09-14 NOTE — H&P
Short Stay Endoscopy History and Physical    PCP - Ti Hendrickson MD    Procedure - Colonoscopy  ASA - per anesthesia  Mallampati - per anesthesia  History of Anesthesia problems - no  Family history Anesthesia problems - no   Plan of anesthesia - General    HPI:  This is a 62 y.o. female here for evaluation of positive FIT test.      ROS:  Constitutional: No fevers, chills, No weight loss  CV: No chest pain  Pulm: No cough, No shortness of breath  GI: see HPI  Derm: No rash    Medical History:  has a past medical history of Anemia, CKD (chronic kidney disease), stage II (5/16/2016), Depression, Hemoglobin C trait, Hypertension, Impaired hearing, Lumbago (8/10/2015), Monoclonal gammopathy (11/10/2016), Nuclear sclerosis of both eyes (10/7/2019), and Tortuous aorta (3/18/2016).    Surgical History:  has a past surgical history that includes Laser ablation of the cervix and Ablation (2008).    Family History: family history includes Diabetes in her sister; Heart failure in her mother; No Known Problems in her brother, brother, brother, brother, maternal aunt, maternal grandfather, maternal grandmother, maternal uncle, paternal aunt, paternal grandfather, paternal grandmother, paternal uncle, sister, sister, sister, sister, and sister; Stroke in her father.. Otherwise no colon cancer, inflammatory bowel disease, or GI malignancies.    Social History:  reports that she has never smoked. She has never used smokeless tobacco. She reports that she does not drink alcohol and does not use drugs.    Review of patient's allergies indicates:   Allergen Reactions    Lactose     Sulfa (sulfonamide antibiotics) Swelling    Latex, natural rubber Rash    Shellfish containing products Rash    Strawberries [strawberry] Rash       Medications:   Medications Prior to Admission   Medication Sig Dispense Refill Last Dose    cetirizine (ZYRTEC) 10 MG tablet Take 1 tablet (10 mg total) by mouth daily as needed for Allergies or  Rhinitis. 90 tablet 3     tiZANidine (ZANAFLEX) 2 MG tablet Take 1-2 tabs PO QHS PRN muscle pain/spasms (Patient not taking: Reported on 8/2/2022) 60 tablet 2          Physical Exam:    Vital Signs: There were no vitals filed for this visit.    Gen: NAD, lying comfortably  HENT: NCAT, oropharynx clear  Eyes: anicteric sclerae, EOMI grossly  Neck: supple, no visible masses/goiter  Cardiac: RRR  Lungs: non-labored breathing  Abd: soft, NT/ND, normoactive BS  Ext: no LE edema, warm, well perfused  Skin: skin intact on exposed body parts, no visible rashes, lesions  Neuro: A&Ox4, neuro exam grossly intact, moves all extremities  Psych: appropriate mood, affect        Labs:  Lab Results   Component Value Date    WBC 4.92 06/24/2022    HGB 12.3 06/24/2022    HCT 37.2 06/24/2022     06/24/2022    CHOL 174 06/24/2022    TRIG 105 06/24/2022    HDL 52 06/24/2022    ALT 30 06/24/2022    AST 26 06/24/2022     06/24/2022    K 4.3 06/24/2022     06/24/2022    CREATININE 1.3 06/24/2022    BUN 14 06/24/2022    CO2 29 06/24/2022    TSH 1.357 06/24/2022    INR 1.1 06/24/2022    HGBA1C 5.7 (H) 06/24/2022       Plan:  Colonoscopy for a positive FIT test.    I have explained the risks and benefits of endoscopy procedures to the patient including but not limited to bleeding, perforation, infection, and death.  The patient was asked if they understand and allowed to ask any further questions to their satisfaction.    Amarilis Chacon MD

## 2022-09-16 LAB
FINAL PATHOLOGIC DIAGNOSIS: NORMAL
GROSS: NORMAL
Lab: NORMAL

## 2022-09-27 ENCOUNTER — PES CALL (OUTPATIENT)
Dept: ADMINISTRATIVE | Facility: CLINIC | Age: 62
End: 2022-09-27
Payer: MEDICARE

## 2022-10-24 ENCOUNTER — PATIENT MESSAGE (OUTPATIENT)
Dept: ADMINISTRATIVE | Facility: OTHER | Age: 62
End: 2022-10-24
Payer: MEDICARE

## 2023-01-31 ENCOUNTER — PATIENT MESSAGE (OUTPATIENT)
Dept: ADMINISTRATIVE | Facility: OTHER | Age: 63
End: 2023-01-31
Payer: MEDICARE

## 2023-02-07 DIAGNOSIS — Z00.00 ENCOUNTER FOR MEDICARE ANNUAL WELLNESS EXAM: ICD-10-CM

## 2023-02-09 DIAGNOSIS — Z00.00 ENCOUNTER FOR MEDICARE ANNUAL WELLNESS EXAM: ICD-10-CM

## 2023-03-13 ENCOUNTER — OFFICE VISIT (OUTPATIENT)
Dept: HEMATOLOGY/ONCOLOGY | Facility: CLINIC | Age: 63
End: 2023-03-13
Payer: MEDICARE

## 2023-03-13 VITALS
WEIGHT: 188.19 LBS | RESPIRATION RATE: 22 BRPM | OXYGEN SATURATION: 97 % | BODY MASS INDEX: 32.13 KG/M2 | HEART RATE: 88 BPM | HEIGHT: 64 IN | TEMPERATURE: 99 F | DIASTOLIC BLOOD PRESSURE: 135 MMHG | SYSTOLIC BLOOD PRESSURE: 202 MMHG

## 2023-03-13 DIAGNOSIS — D58.2 HEMOGLOBIN C TRAIT: Primary | ICD-10-CM

## 2023-03-13 PROCEDURE — 1159F PR MEDICATION LIST DOCUMENTED IN MEDICAL RECORD: ICD-10-PCS | Mod: HCNC,CPTII,S$GLB, | Performed by: INTERNAL MEDICINE

## 2023-03-13 PROCEDURE — 1159F MED LIST DOCD IN RCRD: CPT | Mod: HCNC,CPTII,S$GLB, | Performed by: INTERNAL MEDICINE

## 2023-03-13 PROCEDURE — 99214 OFFICE O/P EST MOD 30 MIN: CPT | Mod: HCNC,S$GLB,, | Performed by: INTERNAL MEDICINE

## 2023-03-13 PROCEDURE — 99999 PR PBB SHADOW E&M-EST. PATIENT-LVL III: ICD-10-PCS | Mod: PBBFAC,HCNC,, | Performed by: INTERNAL MEDICINE

## 2023-03-13 PROCEDURE — 3077F SYST BP >= 140 MM HG: CPT | Mod: HCNC,CPTII,S$GLB, | Performed by: INTERNAL MEDICINE

## 2023-03-13 PROCEDURE — 99499 UNLISTED E&M SERVICE: CPT | Mod: S$GLB,,, | Performed by: INTERNAL MEDICINE

## 2023-03-13 PROCEDURE — 3008F PR BODY MASS INDEX (BMI) DOCUMENTED: ICD-10-PCS | Mod: HCNC,CPTII,S$GLB, | Performed by: INTERNAL MEDICINE

## 2023-03-13 PROCEDURE — 3080F DIAST BP >= 90 MM HG: CPT | Mod: HCNC,CPTII,S$GLB, | Performed by: INTERNAL MEDICINE

## 2023-03-13 PROCEDURE — 3080F PR MOST RECENT DIASTOLIC BLOOD PRESSURE >= 90 MM HG: ICD-10-PCS | Mod: HCNC,CPTII,S$GLB, | Performed by: INTERNAL MEDICINE

## 2023-03-13 PROCEDURE — 99214 PR OFFICE/OUTPT VISIT, EST, LEVL IV, 30-39 MIN: ICD-10-PCS | Mod: HCNC,S$GLB,, | Performed by: INTERNAL MEDICINE

## 2023-03-13 PROCEDURE — 3077F PR MOST RECENT SYSTOLIC BLOOD PRESSURE >= 140 MM HG: ICD-10-PCS | Mod: HCNC,CPTII,S$GLB, | Performed by: INTERNAL MEDICINE

## 2023-03-13 PROCEDURE — 99999 PR PBB SHADOW E&M-EST. PATIENT-LVL III: CPT | Mod: PBBFAC,HCNC,, | Performed by: INTERNAL MEDICINE

## 2023-03-13 PROCEDURE — 3008F BODY MASS INDEX DOCD: CPT | Mod: HCNC,CPTII,S$GLB, | Performed by: INTERNAL MEDICINE

## 2023-03-13 NOTE — PROGRESS NOTES
Subjective:       Patient ID: Omid Rascon is a 62 y.o. female.    Chief Complaint: No chief complaint on file.      Today's Visit  Patient unaware she had updated CBC June 2022  Thought she only had labs in 2021  Wanted to review status of anemia      HPI  Mrs. Rascon is a 56 year old black female from the [place] with history for hypertension, hgb C trait, hearing impairment, CKD2 and history of back injury who returns for follow up regarding her history of a right frontal head ba first detected January 2016 and later associated with an MGUS that was detected 11/10/16. MGUS was no longer detected in 2/27/17.  Her right frontal calvarial mass resolved spontaneously by April 2017. In August 2017, patient had a mild elevation in serum kappa light chain 3.42 mg/dL with ratio 1.97. Repeat 12/29/17 showed decrease in kappa light chain to 2.95 with ratio of 1.77.  On prior visits, complained of more fatigue and more prominent back pain. No headache, changes in vision. No weight loss or night sweats, masses or lymphadenoapthy. Hgb slightly lower than prior at 11.4 g/dL. Has had chronically low MCV and MCH 77 and 26.1 respectively. She has normal plt and WBC. Cr on 12/11/17 was 1.0. Iron studies were within normal range. She had an MRI of spine 1/8/18 which showed focal area of marrow signal abnormality posterior to the T4 vertebral body, possibly a normal variant and a focal area of marrow signal abnormality posterior to the L4 vertebral body which is favored to represent a slightly atypical osseous hemangioma. Patient comes for follow up for anemia and slight elevation in light chains. Last light chain analysis showed a . Hgb 11.8 which is stable baseline. 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 Cr today 1.2. BP in doctor's office is always high. She states it is controlled at home.      Remote History:  Mrs. Rascon was evaluated by Dr. Keith of neurosurgery in January 2016  for a right scalp mass. She had imaging studies 1/28/16 that included head CT and brain MRI. Right frontal T1 hypointense calvarial lesion with prominence of the overlying soft tissues. Additionally, there is a mildly enhancing dural lesion along the anterior falx without evidence for intra-axial enhancement or parenchymal lesion. Lucencies in the right frontal calvarium and hyperattenuating lesion along the anterior cerebral falx correspond to lesions seen on MRI.   She was referred to Dr. Ken for oncologic work up. CT of neck chest abdomen and pelvis were negative for malignancy in February 2016. Patient had repeat brain and and calvarium imaging in March of 2016 prior to anticipated biopsy. Lesions appeared unchanged.   Surgery for biopsy was postponed due to patient's uncontrolled blood pressure.   She has been seen by nephrology, Dr. Rosa to address her refractory hypertension as well as her early stage CKD. Serum free light cahin, SPEP and KEVIN drawn 11/10/16. FLC showed mildly elevated kappa = 2.29 with babak ratio. SPEP was normal but KEVIN detected an IgG lambda specific monoclonal protein at the beta/gamma junction. This had been seen on KEVIN 5/16/16 as well.   She reports longstanding history of anemia she attributes to Hgb C trait. She has received two blood transfusions in the distant past. She has a history of heavy menstrual bleeding which required a uterine lining ablation.    Review of Systems   Constitutional:  Negative for appetite change and unexpected weight change.   Eyes:  Negative for visual disturbance.   Respiratory:  Negative for cough and shortness of breath.    Cardiovascular:  Negative for chest pain.   Gastrointestinal:  Negative for abdominal pain and diarrhea.   Genitourinary:  Negative for frequency.   Musculoskeletal:  Positive for back pain.   Skin:  Negative for rash.   Neurological:  Negative for headaches.   Hematological:  Negative for adenopathy.   Psychiatric/Behavioral:   The patient is not nervous/anxious.      Objective:      Physical Exam  Vitals and nursing note reviewed.   Constitutional:       Appearance: She is well-developed.   HENT:      Head: Normocephalic and atraumatic.   Eyes:      General: No scleral icterus.     Conjunctiva/sclera: Conjunctivae normal.   Cardiovascular:      Rate and Rhythm: Normal rate.   Pulmonary:      Effort: Pulmonary effort is normal. No respiratory distress.   Abdominal:      General: There is no distension.      Tenderness: There is no abdominal tenderness.   Musculoskeletal:         General: Normal range of motion.      Cervical back: Normal range of motion and neck supple.   Skin:     Coloration: Skin is not jaundiced.      Findings: No bruising.   Neurological:      Mental Status: She is alert and oriented to person, place, and time.      Cranial Nerves: No cranial nerve deficit.   Psychiatric:         Behavior: Behavior normal.       Assessment:       No diagnosis found.      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    Discussed normal hemoglobin since 2020  No need to repeat CBC today  Discussed she has annual visit with PCP in May where routine labs with be updated    Return to hematology as needed.    A total of 20 minutes was spent in pre-visit chart review, personal interpretation of labs and imaging, and medication review. Total visit time 30 minutes, >50 % counseling.

## 2023-04-19 ENCOUNTER — OFFICE VISIT (OUTPATIENT)
Dept: FAMILY MEDICINE | Facility: CLINIC | Age: 63
End: 2023-04-19
Payer: MEDICARE

## 2023-04-19 VITALS
DIASTOLIC BLOOD PRESSURE: 100 MMHG | WEIGHT: 183.75 LBS | HEART RATE: 96 BPM | HEIGHT: 64 IN | TEMPERATURE: 98 F | BODY MASS INDEX: 31.37 KG/M2 | SYSTOLIC BLOOD PRESSURE: 176 MMHG | OXYGEN SATURATION: 98 %

## 2023-04-19 DIAGNOSIS — F33.41 RECURRENT MAJOR DEPRESSIVE DISORDER, IN PARTIAL REMISSION: ICD-10-CM

## 2023-04-19 DIAGNOSIS — M54.50 CHRONIC BILATERAL LOW BACK PAIN WITHOUT SCIATICA: Chronic | ICD-10-CM

## 2023-04-19 DIAGNOSIS — N25.81 SECONDARY HYPERPARATHYROIDISM: ICD-10-CM

## 2023-04-19 DIAGNOSIS — Z00.00 ROUTINE MEDICAL EXAM: Primary | ICD-10-CM

## 2023-04-19 DIAGNOSIS — D58.2 HEMOGLOBIN C TRAIT: Chronic | ICD-10-CM

## 2023-04-19 DIAGNOSIS — G89.29 CHRONIC BILATERAL LOW BACK PAIN WITHOUT SCIATICA: Chronic | ICD-10-CM

## 2023-04-19 DIAGNOSIS — E04.2 MULTINODULAR THYROID: Chronic | ICD-10-CM

## 2023-04-19 DIAGNOSIS — I77.1 TORTUOUS AORTA: Chronic | ICD-10-CM

## 2023-04-19 DIAGNOSIS — I10 WHITE COAT SYNDROME WITH DIAGNOSIS OF HYPERTENSION: Chronic | ICD-10-CM

## 2023-04-19 DIAGNOSIS — N18.31 CHRONIC KIDNEY DISEASE, STAGE 3A: Chronic | ICD-10-CM

## 2023-04-19 DIAGNOSIS — R73.09 OTHER ABNORMAL GLUCOSE: ICD-10-CM

## 2023-04-19 DIAGNOSIS — Z23 NEED FOR VACCINATION FOR PNEUMOCOCCUS: ICD-10-CM

## 2023-04-19 PROCEDURE — 3008F BODY MASS INDEX DOCD: CPT | Mod: HCNC,CPTII,S$GLB, | Performed by: INTERNAL MEDICINE

## 2023-04-19 PROCEDURE — 1159F PR MEDICATION LIST DOCUMENTED IN MEDICAL RECORD: ICD-10-PCS | Mod: HCNC,CPTII,S$GLB, | Performed by: INTERNAL MEDICINE

## 2023-04-19 PROCEDURE — 3080F DIAST BP >= 90 MM HG: CPT | Mod: HCNC,CPTII,S$GLB, | Performed by: INTERNAL MEDICINE

## 2023-04-19 PROCEDURE — 3008F PR BODY MASS INDEX (BMI) DOCUMENTED: ICD-10-PCS | Mod: HCNC,CPTII,S$GLB, | Performed by: INTERNAL MEDICINE

## 2023-04-19 PROCEDURE — 99999 PR PBB SHADOW E&M-EST. PATIENT-LVL IV: ICD-10-PCS | Mod: PBBFAC,HCNC,, | Performed by: INTERNAL MEDICINE

## 2023-04-19 PROCEDURE — 3080F PR MOST RECENT DIASTOLIC BLOOD PRESSURE >= 90 MM HG: ICD-10-PCS | Mod: HCNC,CPTII,S$GLB, | Performed by: INTERNAL MEDICINE

## 2023-04-19 PROCEDURE — 3077F PR MOST RECENT SYSTOLIC BLOOD PRESSURE >= 140 MM HG: ICD-10-PCS | Mod: HCNC,CPTII,S$GLB, | Performed by: INTERNAL MEDICINE

## 2023-04-19 PROCEDURE — 1159F MED LIST DOCD IN RCRD: CPT | Mod: HCNC,CPTII,S$GLB, | Performed by: INTERNAL MEDICINE

## 2023-04-19 PROCEDURE — 1160F RVW MEDS BY RX/DR IN RCRD: CPT | Mod: HCNC,CPTII,S$GLB, | Performed by: INTERNAL MEDICINE

## 2023-04-19 PROCEDURE — 99396 PR PREVENTIVE VISIT,EST,40-64: ICD-10-PCS | Mod: HCNC,25,S$GLB, | Performed by: INTERNAL MEDICINE

## 2023-04-19 PROCEDURE — 3077F SYST BP >= 140 MM HG: CPT | Mod: HCNC,CPTII,S$GLB, | Performed by: INTERNAL MEDICINE

## 2023-04-19 PROCEDURE — 99999 PR PBB SHADOW E&M-EST. PATIENT-LVL IV: CPT | Mod: PBBFAC,HCNC,, | Performed by: INTERNAL MEDICINE

## 2023-04-19 PROCEDURE — 90677 PCV20 VACCINE IM: CPT | Mod: HCNC,S$GLB,, | Performed by: INTERNAL MEDICINE

## 2023-04-19 PROCEDURE — 99396 PREV VISIT EST AGE 40-64: CPT | Mod: HCNC,25,S$GLB, | Performed by: INTERNAL MEDICINE

## 2023-04-19 PROCEDURE — G0009 ADMIN PNEUMOCOCCAL VACCINE: HCPCS | Mod: HCNC,S$GLB,, | Performed by: INTERNAL MEDICINE

## 2023-04-19 PROCEDURE — G0009 PNEUMOCOCCAL CONJUGATE VACCINE 20-VALENT: ICD-10-PCS | Mod: HCNC,S$GLB,, | Performed by: INTERNAL MEDICINE

## 2023-04-19 PROCEDURE — 90677 PNEUMOCOCCAL CONJUGATE VACCINE 20-VALENT: ICD-10-PCS | Mod: HCNC,S$GLB,, | Performed by: INTERNAL MEDICINE

## 2023-04-19 PROCEDURE — 1160F PR REVIEW ALL MEDS BY PRESCRIBER/CLIN PHARMACIST DOCUMENTED: ICD-10-PCS | Mod: HCNC,CPTII,S$GLB, | Performed by: INTERNAL MEDICINE

## 2023-04-19 NOTE — PROGRESS NOTES
Assessment & Plan  Problem List Items Addressed This Visit          Cardiac/Vascular    White coat syndrome with diagnosis of hypertension (Chronic)    Overview     In Digital HTN program with excellent control.     If persistent elevation melissa-operatively could consider using IV Labetalol PRN while monitoring her pulse.  If bradycardic, hydralazine can be used           Current Assessment & Plan     Doing great with excellent home readings.  SHe may not have hypertension and may have purely white coat HTN as her home readings are so good without medications.            Tortuous aorta (Chronic)    Overview     Stable, asymptomatic chronic condition.  Will continue to maximize risk factor reduction and adjust medication as needed.               Renal/    Chronic kidney disease, stage 3a (Chronic)    Overview     Reports adverse reactions to ACE-I in the past           Current Assessment & Plan     Continue sodium restriction, and avoidance of nephrotoxic agents. Known to renal.  Keep routine follow up with them in 6 mo if labs stable. Sooner if not           Relevant Orders    CBC Auto Differential    Comprehensive Metabolic Panel    Lipid Panel    Magnesium    Vitamin D    Phosphorus    PTH, Intact    Protein/Creatinine Ratio, Urine       Oncology    Hemoglobin C trait (Chronic)    Overview     Followed by Heme Onc.  Follow up PRN              Endocrine    Multinodular thyroid (Chronic)    Overview     Seen on MRI c-spine.  Reassuring US 2019           Current Assessment & Plan     Recheck TSH           Relevant Orders    TSH       Orthopedic    Chronic bilateral low back pain without sciatica (Chronic)    Overview     MRI 01/2018.  Lumbar degenerative changes contributing to mild bilateral neural foraminal narrowing at L4-L5 and L5-S1.  No spinal canal stenosis.           Current Assessment & Plan     Generally doing ok.  Avoid NSAIDs.  Ok for tylenol            Other Visit Diagnoses       Routine medical exam     -  Primary  -   Discussed healthy diet, regular exercise, necessary labs, age appropriate cancer screening, and routine vaccinations.       Other abnormal glucose    -  [id number]    Relevant Orders    Hemoglobin A1C    Need for vaccination for pneumococcus    -  vaccinate    Relevant Orders    Pneumococcal Conjugate Vaccine (20 Valent) (IM)              Health Maintenance reviewed, as above.    Follow-up: Follow up in about 1 year (around 4/19/2024) for Routine physical.  Sooner if she will no longer see nephrology.  ______________________________________________________________________    Chief Complaint  Chief Complaint   Patient presents with    Annual Exam       HPI  Omid Rascon is a 62 y.o. female with medical diagnoses as listed in the medical history and problem list that presents for routine physical.  Pt is known to me with their last appointment 06/2022.      She is due for labs.    No acute complaints.  Wasn't able to have her URogyn surgery due to inability to get to [place].       PAST MEDICAL HISTORY:  Past Medical History:   Diagnosis Date    Anemia     CKD (chronic kidney disease), stage II 5/16/2016    Depression     Hemoglobin C trait     Hypertension     no meds    Impaired hearing     Lumbago 8/10/2015    Monoclonal gammopathy 11/10/2016    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       PAST SURGICAL HISTORY:  Past Surgical History:   Procedure Laterality Date    ABLATION  2008    COLONOSCOPY N/A 9/14/2022    Procedure: COLONOSCOPY;  Surgeon: Amarilis Chacon MD;  Location: [facility];  Service: Endoscopy;  Laterality: N/A;  positive FIT.Fully vaccinated EC    LASER ABLATION OF THE CERVIX         SOCIAL HISTORY:  Social History     Socioeconomic History    Marital status: Other   Tobacco Use    Smoking status: Never     Passive exposure: Never    Smokeless tobacco: Never   Substance and Sexual Activity    Alcohol use: No    Drug use: No    Sexual activity: Yes      Partners: Male     Birth control/protection: See Surgical Hx     Social Determinants of Health     Financial Resource Strain: Low Risk     Difficulty of Paying Living Expenses: Not very hard   Food Insecurity: No Food Insecurity    Worried About Running Out of Food in the Last Year: Never true    Ran Out of Food in the Last Year: Never true   Transportation Needs: No Transportation Needs    Lack of Transportation (Medical): No    Lack of Transportation (Non-Medical): No   Physical Activity: Sufficiently Active    Days of Exercise per Week: 7 days    Minutes of Exercise per Session: 90 min   Stress: No Stress Concern Present    Feeling of Stress : Only a little   Social Connections: Unknown    Frequency of Communication with Friends and Family: More than three times a week    Frequency of Social Gatherings with Friends and Family: Patient refused    Active Member of Clubs or Organizations: Yes    Attends Club or Organization Meetings: More than 4 times per year    Marital Status:    Housing Stability: Low Risk     Unable to Pay for Housing in the Last Year: No    Number of Places Lived in the Last Year: 1    Unstable Housing in the Last Year: No       FAMILY HISTORY:  Family History   Problem Relation Age of Onset    Stroke Father     Heart failure Mother     No Known Problems Sister     No Known Problems Brother     No Known Problems Brother     No Known Problems Brother     No Known Problems Sister     No Known Problems Sister     No Known Problems Sister     No Known Problems Brother     No Known Problems Sister     Diabetes Sister     No Known Problems Maternal Aunt     No Known Problems Maternal Uncle     No Known Problems Paternal Aunt     No Known Problems Paternal Uncle     No Known Problems Maternal Grandmother     No Known Problems Maternal Grandfather     No Known Problems Paternal Grandmother     No Known Problems Paternal Grandfather     Amblyopia Neg Hx     Blindness Neg Hx     Cancer Neg Hx   "   Cataracts Neg Hx     Glaucoma Neg Hx     Hypertension Neg Hx     Macular degeneration Neg Hx     Retinal detachment Neg Hx     Strabismus Neg Hx     Thyroid disease Neg Hx     Breast cancer Neg Hx     Colon cancer Neg Hx     Ovarian cancer Neg Hx        ALLERGIES AND MEDICATIONS: updated and reviewed.  Review of patient's allergies indicates:   Allergen Reactions    Lactose     Sulfa (sulfonamide antibiotics) Swelling    Latex, natural rubber Rash    Shellfish containing products Rash    Strawberries [strawberry] Rash     Current Outpatient Medications   Medication Sig Dispense Refill    ergocalciferol, vitamin D2, (VITAMIN D ORAL) Take by mouth.      cetirizine (ZYRTEC) 10 MG tablet Take 1 tablet (10 mg total) by mouth daily as needed for Allergies or Rhinitis. 90 tablet 3     No current facility-administered medications for this visit.         ROS  Review of Systems   Constitutional:  Positive for unexpected weight change. Negative for activity change.   HENT:  Negative for hearing loss, rhinorrhea and trouble swallowing.    Eyes:  Negative for discharge and visual disturbance.   Respiratory:  Negative for chest tightness and wheezing.    Cardiovascular:  Negative for chest pain and palpitations.   Gastrointestinal:  Negative for blood in stool, constipation, diarrhea and vomiting.   Endocrine: Positive for polyuria. Negative for polydipsia.   Genitourinary:  Negative for difficulty urinating, dysuria, hematuria and menstrual problem.   Musculoskeletal:  Positive for back pain. Negative for arthralgias, joint swelling and neck pain.   Neurological:  Negative for weakness and headaches.   Psychiatric/Behavioral:  Negative for confusion and dysphoric mood.          Physical Exam  Vitals:    04/19/23 0948   BP: (!) 176/100   Pulse: 96   Temp: 97.9 °F (36.6 °C)   SpO2: 98%   Weight: 83.3 kg (183 lb 12.1 oz)   Height: 5' 4" (1.626 m)    Body mass index is 31.54 kg/m².  Weight: 83.3 kg (183 lb 12.1 oz)   Height: 5' " "4" (162.6 cm)   Physical Exam  Constitutional:       General: She is not in acute distress.     Appearance: She is well-developed.   HENT:      Head: Normocephalic and atraumatic.   Eyes:      General: Lids are normal. No scleral icterus.     Conjunctiva/sclera: Conjunctivae normal.      Pupils: Pupils are equal, round, and reactive to light.   Neck:      Thyroid: No thyromegaly.      Vascular: No carotid bruit or JVD.   Cardiovascular:      Rate and Rhythm: Normal rate and regular rhythm.      Pulses: Normal pulses.      Heart sounds: Normal heart sounds. No murmur heard.    No friction rub. No S3 or S4 sounds.   Pulmonary:      Effort: Pulmonary effort is normal.      Breath sounds: Normal breath sounds. No wheezing, rhonchi or rales.   Abdominal:      General: Bowel sounds are normal.      Palpations: Abdomen is soft.      Tenderness: There is no abdominal tenderness.   Musculoskeletal:         General: No tenderness.      Cervical back: Full passive range of motion without pain and neck supple.      Right lower leg: No edema.      Left lower leg: No edema.   Skin:     General: Skin is warm and dry.      Findings: No rash.   Neurological:      Mental Status: She is alert and oriented to person, place, and time.   Psychiatric:         Speech: Speech normal.         Behavior: Behavior normal.         Thought Content: Thought content normal.           Health Maintenance         Date Due Completion Date    Pneumococcal Vaccines (Age 0-64) (1 - PCV) Never done ---    COVID-19 Vaccine (4 - Booster for Pfizer series) 02/03/2022 12/9/2021    Cervical Cancer Screening 04/20/2023 4/20/2018    Mammogram 08/17/2023 8/17/2022    Colorectal Cancer Screening 09/14/2023 9/14/2022    Override on 8/1/2010: Done (reportedly normal per patient)    Hemoglobin A1c (Diabetic Prevention Screening) 06/24/2025 6/24/2022    TETANUS VACCINE 04/10/2027 4/10/2017    Lipid Panel 06/24/2027 6/24/2022              "

## 2023-04-19 NOTE — ASSESSMENT & PLAN NOTE
Doing great with excellent home readings.  SHe may not have hypertension and may have purely white coat HTN as her home readings are so good without medications.

## 2023-04-19 NOTE — ASSESSMENT & PLAN NOTE
Continue sodium restriction, and avoidance of nephrotoxic agents. Known to renal.  Keep routine follow up with them in 6 mo if labs stable. Sooner if not

## 2023-04-20 ENCOUNTER — LAB VISIT (OUTPATIENT)
Dept: LAB | Facility: HOSPITAL | Age: 63
End: 2023-04-20
Attending: INTERNAL MEDICINE
Payer: MEDICARE

## 2023-04-20 DIAGNOSIS — E04.2 MULTINODULAR THYROID: Chronic | ICD-10-CM

## 2023-04-20 DIAGNOSIS — N18.31 CHRONIC KIDNEY DISEASE, STAGE 3A: Chronic | ICD-10-CM

## 2023-04-20 DIAGNOSIS — R73.09 OTHER ABNORMAL GLUCOSE: ICD-10-CM

## 2023-04-20 LAB
25(OH)D3+25(OH)D2 SERPL-MCNC: 62 NG/ML (ref 30–96)
ALBUMIN SERPL BCP-MCNC: 4.1 G/DL (ref 3.5–5.2)
ALP SERPL-CCNC: 53 U/L (ref 55–135)
ALT SERPL W/O P-5'-P-CCNC: 20 U/L (ref 10–44)
ANION GAP SERPL CALC-SCNC: 15 MMOL/L (ref 8–16)
AST SERPL-CCNC: 17 U/L (ref 10–40)
BASOPHILS # BLD AUTO: 0.06 K/UL (ref 0–0.2)
BASOPHILS NFR BLD: 0.9 % (ref 0–1.9)
BILIRUB SERPL-MCNC: 0.5 MG/DL (ref 0.1–1)
BUN SERPL-MCNC: 18 MG/DL (ref 8–23)
CALCIUM SERPL-MCNC: 9.6 MG/DL (ref 8.7–10.5)
CHLORIDE SERPL-SCNC: 104 MMOL/L (ref 95–110)
CHOLEST SERPL-MCNC: 177 MG/DL (ref 120–199)
CHOLEST/HDLC SERPL: 3 {RATIO} (ref 2–5)
CO2 SERPL-SCNC: 23 MMOL/L (ref 23–29)
CREAT SERPL-MCNC: 1.3 MG/DL (ref 0.5–1.4)
DIFFERENTIAL METHOD: ABNORMAL
EOSINOPHIL # BLD AUTO: 0.1 K/UL (ref 0–0.5)
EOSINOPHIL NFR BLD: 1.3 % (ref 0–8)
ERYTHROCYTE [DISTWIDTH] IN BLOOD BY AUTOMATED COUNT: 14.6 % (ref 11.5–14.5)
EST. GFR  (NO RACE VARIABLE): 46.2 ML/MIN/1.73 M^2
ESTIMATED AVG GLUCOSE: 120 MG/DL (ref 68–131)
GLUCOSE SERPL-MCNC: 98 MG/DL (ref 70–110)
HBA1C MFR BLD: 5.8 % (ref 4–5.6)
HCT VFR BLD AUTO: 39.7 % (ref 37–48.5)
HDLC SERPL-MCNC: 59 MG/DL (ref 40–75)
HDLC SERPL: 33.3 % (ref 20–50)
HGB BLD-MCNC: 12.9 G/DL (ref 12–16)
IMM GRANULOCYTES # BLD AUTO: 0.02 K/UL (ref 0–0.04)
IMM GRANULOCYTES NFR BLD AUTO: 0.3 % (ref 0–0.5)
LDLC SERPL CALC-MCNC: 105 MG/DL (ref 63–159)
LYMPHOCYTES # BLD AUTO: 1.9 K/UL (ref 1–4.8)
LYMPHOCYTES NFR BLD: 28 % (ref 18–48)
MAGNESIUM SERPL-MCNC: 1.9 MG/DL (ref 1.6–2.6)
MCH RBC QN AUTO: 26.5 PG (ref 27–31)
MCHC RBC AUTO-ENTMCNC: 32.5 G/DL (ref 32–36)
MCV RBC AUTO: 82 FL (ref 82–98)
MONOCYTES # BLD AUTO: 0.5 K/UL (ref 0.3–1)
MONOCYTES NFR BLD: 8.1 % (ref 4–15)
NEUTROPHILS # BLD AUTO: 4.1 K/UL (ref 1.8–7.7)
NEUTROPHILS NFR BLD: 61.4 % (ref 38–73)
NONHDLC SERPL-MCNC: 118 MG/DL
NRBC BLD-RTO: 0 /100 WBC
PHOSPHATE SERPL-MCNC: 3.1 MG/DL (ref 2.7–4.5)
PLATELET # BLD AUTO: 214 K/UL (ref 150–450)
PMV BLD AUTO: 11.9 FL (ref 9.2–12.9)
POTASSIUM SERPL-SCNC: 4.2 MMOL/L (ref 3.5–5.1)
PROT SERPL-MCNC: 7.9 G/DL (ref 6–8.4)
PTH-INTACT SERPL-MCNC: 133.3 PG/ML (ref 9–77)
RBC # BLD AUTO: 4.86 M/UL (ref 4–5.4)
SODIUM SERPL-SCNC: 142 MMOL/L (ref 136–145)
TRIGL SERPL-MCNC: 65 MG/DL (ref 30–150)
TSH SERPL DL<=0.005 MIU/L-ACNC: 0.72 UIU/ML (ref 0.4–4)
WBC # BLD AUTO: 6.68 K/UL (ref 3.9–12.7)

## 2023-04-20 PROCEDURE — 83036 HEMOGLOBIN GLYCOSYLATED A1C: CPT | Mod: HCNC | Performed by: INTERNAL MEDICINE

## 2023-04-20 PROCEDURE — 82306 VITAMIN D 25 HYDROXY: CPT | Mod: HCNC | Performed by: INTERNAL MEDICINE

## 2023-04-20 PROCEDURE — 83735 ASSAY OF MAGNESIUM: CPT | Mod: HCNC | Performed by: INTERNAL MEDICINE

## 2023-04-20 PROCEDURE — 85025 COMPLETE CBC W/AUTO DIFF WBC: CPT | Mod: HCNC | Performed by: INTERNAL MEDICINE

## 2023-04-20 PROCEDURE — 36415 COLL VENOUS BLD VENIPUNCTURE: CPT | Mod: HCNC,PO | Performed by: INTERNAL MEDICINE

## 2023-04-20 PROCEDURE — 80061 LIPID PANEL: CPT | Mod: HCNC | Performed by: INTERNAL MEDICINE

## 2023-04-20 PROCEDURE — 84443 ASSAY THYROID STIM HORMONE: CPT | Mod: HCNC | Performed by: INTERNAL MEDICINE

## 2023-04-20 PROCEDURE — 80053 COMPREHEN METABOLIC PANEL: CPT | Mod: HCNC | Performed by: INTERNAL MEDICINE

## 2023-04-20 PROCEDURE — 83970 ASSAY OF PARATHORMONE: CPT | Mod: HCNC | Performed by: INTERNAL MEDICINE

## 2023-04-20 PROCEDURE — 84100 ASSAY OF PHOSPHORUS: CPT | Mod: HCNC | Performed by: INTERNAL MEDICINE

## 2023-04-21 ENCOUNTER — PATIENT MESSAGE (OUTPATIENT)
Dept: ADMINISTRATIVE | Facility: OTHER | Age: 63
End: 2023-04-21
Payer: MEDICARE

## 2023-04-21 PROBLEM — R73.03 PREDIABETES: Chronic | Status: ACTIVE | Noted: 2023-04-21

## 2023-04-21 NOTE — PROGRESS NOTES
These lab results are generally looking fine.  Your sugars have been running slightly high and are in a Prediabetic range.  This does not need medication at this time.  I would recommend a mostly plant based diet with limited red meats/refined grains/processed foods/added sugars.      Sincerely,  Ti Hendrickson MD

## 2023-05-26 ENCOUNTER — PATIENT MESSAGE (OUTPATIENT)
Dept: RESEARCH | Facility: CLINIC | Age: 63
End: 2023-05-26
Payer: MEDICARE

## 2023-06-23 ENCOUNTER — PATIENT MESSAGE (OUTPATIENT)
Dept: FAMILY MEDICINE | Facility: CLINIC | Age: 63
End: 2023-06-23
Payer: MEDICARE

## 2023-06-26 ENCOUNTER — PATIENT MESSAGE (OUTPATIENT)
Dept: FAMILY MEDICINE | Facility: CLINIC | Age: 63
End: 2023-06-26
Payer: MEDICARE

## 2023-07-26 ENCOUNTER — TELEPHONE (OUTPATIENT)
Dept: ADMINISTRATIVE | Facility: CLINIC | Age: 63
End: 2023-07-26
Payer: MEDICARE

## 2023-07-26 NOTE — TELEPHONE ENCOUNTER
Called pt; no answer; could not confirm appt or leave message due to line kept ringing; I was calling to confirm pt's in office EAWV appt on 7/27/23.

## 2023-07-27 ENCOUNTER — OFFICE VISIT (OUTPATIENT)
Dept: OBSTETRICS AND GYNECOLOGY | Facility: CLINIC | Age: 63
End: 2023-07-27
Payer: MEDICARE

## 2023-07-27 VITALS
BODY MASS INDEX: 31.52 KG/M2 | WEIGHT: 183.63 LBS | SYSTOLIC BLOOD PRESSURE: 218 MMHG | DIASTOLIC BLOOD PRESSURE: 110 MMHG

## 2023-07-27 DIAGNOSIS — Z12.31 ENCOUNTER FOR SCREENING MAMMOGRAM FOR MALIGNANT NEOPLASM OF BREAST: Primary | ICD-10-CM

## 2023-07-27 DIAGNOSIS — Z12.4 SCREENING FOR CERVICAL CANCER: ICD-10-CM

## 2023-07-27 PROCEDURE — 1159F PR MEDICATION LIST DOCUMENTED IN MEDICAL RECORD: ICD-10-PCS | Mod: HCNC,CPTII,S$GLB, | Performed by: OBSTETRICS & GYNECOLOGY

## 2023-07-27 PROCEDURE — 99999 PR PBB SHADOW E&M-EST. PATIENT-LVL III: ICD-10-PCS | Mod: PBBFAC,HCNC,, | Performed by: OBSTETRICS & GYNECOLOGY

## 2023-07-27 PROCEDURE — 3080F PR MOST RECENT DIASTOLIC BLOOD PRESSURE >= 90 MM HG: ICD-10-PCS | Mod: HCNC,CPTII,S$GLB, | Performed by: OBSTETRICS & GYNECOLOGY

## 2023-07-27 PROCEDURE — 3008F BODY MASS INDEX DOCD: CPT | Mod: HCNC,CPTII,S$GLB, | Performed by: OBSTETRICS & GYNECOLOGY

## 2023-07-27 PROCEDURE — 3077F PR MOST RECENT SYSTOLIC BLOOD PRESSURE >= 140 MM HG: ICD-10-PCS | Mod: HCNC,CPTII,S$GLB, | Performed by: OBSTETRICS & GYNECOLOGY

## 2023-07-27 PROCEDURE — 1159F MED LIST DOCD IN RCRD: CPT | Mod: HCNC,CPTII,S$GLB, | Performed by: OBSTETRICS & GYNECOLOGY

## 2023-07-27 PROCEDURE — 1160F RVW MEDS BY RX/DR IN RCRD: CPT | Mod: HCNC,CPTII,S$GLB, | Performed by: OBSTETRICS & GYNECOLOGY

## 2023-07-27 PROCEDURE — 3008F PR BODY MASS INDEX (BMI) DOCUMENTED: ICD-10-PCS | Mod: HCNC,CPTII,S$GLB, | Performed by: OBSTETRICS & GYNECOLOGY

## 2023-07-27 PROCEDURE — 3044F HG A1C LEVEL LT 7.0%: CPT | Mod: HCNC,CPTII,S$GLB, | Performed by: OBSTETRICS & GYNECOLOGY

## 2023-07-27 PROCEDURE — 3077F SYST BP >= 140 MM HG: CPT | Mod: HCNC,CPTII,S$GLB, | Performed by: OBSTETRICS & GYNECOLOGY

## 2023-07-27 PROCEDURE — G0101 PR CA SCREEN;PELVIC/BREAST EXAM: ICD-10-PCS | Mod: HCNC,S$GLB,, | Performed by: OBSTETRICS & GYNECOLOGY

## 2023-07-27 PROCEDURE — 87624 HPV HI-RISK TYP POOLED RSLT: CPT | Mod: HCNC | Performed by: OBSTETRICS & GYNECOLOGY

## 2023-07-27 PROCEDURE — 1160F PR REVIEW ALL MEDS BY PRESCRIBER/CLIN PHARMACIST DOCUMENTED: ICD-10-PCS | Mod: HCNC,CPTII,S$GLB, | Performed by: OBSTETRICS & GYNECOLOGY

## 2023-07-27 PROCEDURE — G0101 CA SCREEN;PELVIC/BREAST EXAM: HCPCS | Mod: HCNC,S$GLB,, | Performed by: OBSTETRICS & GYNECOLOGY

## 2023-07-27 PROCEDURE — 99999 PR PBB SHADOW E&M-EST. PATIENT-LVL III: CPT | Mod: PBBFAC,HCNC,, | Performed by: OBSTETRICS & GYNECOLOGY

## 2023-07-27 PROCEDURE — 3080F DIAST BP >= 90 MM HG: CPT | Mod: HCNC,CPTII,S$GLB, | Performed by: OBSTETRICS & GYNECOLOGY

## 2023-07-27 PROCEDURE — 3044F PR MOST RECENT HEMOGLOBIN A1C LEVEL <7.0%: ICD-10-PCS | Mod: HCNC,CPTII,S$GLB, | Performed by: OBSTETRICS & GYNECOLOGY

## 2023-07-27 PROCEDURE — 88175 CYTOPATH C/V AUTO FLUID REDO: CPT | Mod: HCNC | Performed by: OBSTETRICS & GYNECOLOGY

## 2023-07-27 NOTE — PROGRESS NOTES
Gynecology    SUBJECTIVE:     Chief Complaint: Vaginal Prolapse       History of Present Illness:  63 year old who presents again to discuss prolapse.  She doesn't have pain, but she does have heaviness in her vagina.  No bleeding.  She can't have sex due to the bulge as well, but reports that she hasn't been sexually active in a while.  She was planned for surgery recently one year ago, but when the location of the surgery changed, she cancelled.     Unfortunately, in the past year, she experience a very embarrassing situation when she was travelling.  She went through the scanner at the airport which showed a bulge in her vaginal area.  There was concern for something abnormal being held there, and was searched by the TSA people in this area.  This was very stressful and upsetting for her.  She now travels in pants and supportive underwear to avoid this awkward situation.     The patient is due for a pap smear today, but is up to date on her annual exam.     Review of Systems:  Review of Systems   Constitutional:  Negative for appetite change, fever and unexpected weight change.   Respiratory:  Negative for shortness of breath.    Cardiovascular:  Negative for chest pain.   Gastrointestinal:  Negative for nausea and vomiting.   Genitourinary:  Positive for pelvic pain. Negative for dyspareunia, vaginal bleeding, vaginal discharge, vaginal pain and postmenopausal bleeding.   Integumentary:  Negative for breast mass.   Breast: Negative for lump and mass     OBJECTIVE:     Physical Exam:  Physical Exam  Vitals and nursing note reviewed.   Constitutional:       Appearance: She is well-developed.   Pulmonary:      Effort: Pulmonary effort is normal.   Chest:   Breasts:     Breasts are symmetrical.      Right: No inverted nipple, mass, nipple discharge, skin change or tenderness.      Left: No inverted nipple, mass, nipple discharge, skin change or tenderness.   Abdominal:      Palpations: Abdomen is soft.    Genitourinary:     General: Normal vulva.      Labia:         Right: No rash, tenderness, lesion or injury.         Left: No rash, tenderness, lesion or injury.       Urethra: No prolapse, urethral pain, urethral swelling or urethral lesion.      Vagina: Normal. No signs of injury and foreign body. No vaginal discharge, erythema, tenderness or bleeding.      Cervix: No cervical motion tenderness, discharge or friability.      Uterus: Not deviated, not enlarged, not fixed and not tender.       Adnexa:         Right: No mass, tenderness or fullness.          Left: No mass, tenderness or fullness.        Rectum: No anal fissure or external hemorrhoid.      Comments: Urethra: normal appearing urethra with no masses, tenderness or lesions  Urethral meatus: normal size, anterior vaginal wall with no prolapse, no lesions  Musculoskeletal:      Cervical back: Normal range of motion.   Neurological:      Mental Status: She is alert and oriented to person, place, and time.   Psychiatric:         Behavior: Behavior normal.         Thought Content: Thought content normal.         Judgment: Judgment normal.       Chaperoned by: Anca    ASSESSMENT:       ICD-10-CM ICD-9-CM    1. Encounter for screening mammogram for malignant neoplasm of breast  Z12.31 V76.12       2. Screening for cervical cancer  Z12.4 V76.2 Liquid-Based Pap Smear, Screening      HPV High Risk Genotypes, PCR             Plan:      Vasilca was seen today for vaginal prolapse.    Diagnoses and all orders for this visit:    Encounter for screening mammogram for malignant neoplasm of breast    Screening for cervical cancer  -     Liquid-Based Pap Smear, Screening  -     HPV High Risk Genotypes, PCR    - blood pressure addressed with patient; reports it is always high at the doctor.  She denies symptoms- headache, vision changes, dizziness, chest pain; I encouraged her to see her PCP or NP with the PCP today (scheduled, then cancelled) and she declines; she  reports that her PCP does follow her with her digital cuff; she will take her blood pressure again today  - message sent to Dr. Contreras for scheduling   - pap done today      Orders Placed This Encounter   Procedures    HPV High Risk Genotypes, PCR           Juhi Polanco

## 2023-08-01 ENCOUNTER — PATIENT MESSAGE (OUTPATIENT)
Dept: RESEARCH | Facility: CLINIC | Age: 63
End: 2023-08-01
Payer: MEDICARE

## 2023-08-01 LAB
FINAL PATHOLOGIC DIAGNOSIS: NORMAL
HPV HR 12 DNA SPEC QL NAA+PROBE: NEGATIVE
HPV16 AG SPEC QL: NEGATIVE
HPV18 DNA SPEC QL NAA+PROBE: NEGATIVE
Lab: NORMAL

## 2023-08-02 ENCOUNTER — DOCUMENTATION ONLY (OUTPATIENT)
Dept: RESEARCH | Facility: CLINIC | Age: 63
End: 2023-08-02
Payer: MEDICARE

## 2023-08-02 DIAGNOSIS — Z00.6 RESEARCH SUBJECT: Primary | ICD-10-CM

## 2023-08-02 NOTE — PROGRESS NOTES
"PROPEL IT CONSENT DOCUMENTATION  On[employer] Protocol 2022013: PROPEL IT  PROmoting Successful Weight Loss in Primary CarE in [place] (PROPEL) using Information Technology  : Guevara Toure, PhD.  IRB of Record: [facility] ([facility]) ID# [facility] [id number]  [facility] Investigator: Mable Sharif MD      Informed Consent Process   Name of Study Team member Present for discussion: N/A   Prior to the Informed Consent being signed or any protocol required testing, procedure or intervention being performed, the following was completed or discussed:   Purpose of the study, qualifications to participate:  Study design, schedule and procedure:  Risks, benefits, alternative treatments, compensation and costs:  Confidentiality and HIPAA authorization for Release of Medical Records for the research trial/subjects right/study related injury:  Study related contact information:  Voluntary participation and withdrawal from the research trial at any time:  Patient has been offered the opportunity to ask questions regarding the study    and PI contact information given to subject:  Signed copy of consent form was provided to the subject via eMail:  Original consent or copy of electronic consent in subject's chart and uploaded in EPIC:        Omid Rascon electronically signed the informed consent form.     Was the consent done electronically: Yes  Consent Signature Date (add to  note) ("Today's Date REDCap):  8-2-2023  The consent form as reviewed by Nathan Paz  Name: ([facility] personnel reviewing consent form):  Teri Nj, Associate Clinical Research Coordinator   Is the potential subject currently enrolled in any other research trials (per Epic)? No  Participant ID (REDCap ID) added to Research Study   Yes    Comments: See  for Consent Forms        I acknowledge that I have reviewed the informed consent form and " viewed the volunteer's electronically signed and dated the signature section of the consent forms.    Yes

## 2023-08-02 NOTE — PROGRESS NOTES
PROPEL IT Weight Loss: Eligibility Confirmation  Remember to add Participant ID in Research Study  Age as of Today: 63 y.o.    Is patient age 40 to 70 years Yes    Is patient race Black/: Epic: Black or      Primary care provider at [facility]: Ti Hendrickson MD     Does the patient have an active [facility] portal account?  Yes    Does the patient have prediabetes or Type 2 diabetes? Yes  If yes, Based on: Prediabetes ICD10 code    LAST HBA1C   Lab Results   Component Value Date    HGBA1C 5.8 (H) 04/20/2023    HGBA1C 5.7 (H) 06/24/2022          LAST BMI:   BMI Readings from Last 1 Encounters:   07/27/23 31.52 kg/m²      Was the patient's most recent BMI (within the past 12 months) between 30 and 50  yes    PCP REFERRAL  Did provider acknowledge or deny patient's participation? Need to Pend Order to PCP (after Consent)  _____________________________________________  LAST WEIGHT  (verify if this was an outpatient):   Wt Readings from Last 4 Encounters:   07/27/23 83.3 kg (183 lb 10.3 oz)   04/19/23 83.3 kg (183 lb 12.1 oz)   03/13/23 85.3 kg (188 lb 2.6 oz)   07/21/22 83 kg (182 lb 14 oz)        PROPEL-IT Screening Date (enter from [facility]): 8-2-2023  Does the patient have a baseline clinic weight collected within 4 weeks (34 days) of Screening Date?  Yes, has baseline clinical wt                    When is the patient's next scheduled clinic appointment (potential wt)? 8-,  Internal Med    Status Updated: 08/02/2023

## 2023-08-03 ENCOUNTER — RESEARCH ENCOUNTER (OUTPATIENT)
Dept: RESEARCH | Facility: CLINIC | Age: 63
End: 2023-08-03
Payer: MEDICARE

## 2023-08-03 DIAGNOSIS — Z00.6 RESEARCH SUBJECT: ICD-10-CM

## 2023-08-15 ENCOUNTER — PATIENT MESSAGE (OUTPATIENT)
Dept: ADMINISTRATIVE | Facility: HOSPITAL | Age: 63
End: 2023-08-15
Payer: MEDICARE

## 2023-08-24 ENCOUNTER — PATIENT OUTREACH (OUTPATIENT)
Dept: ADMINISTRATIVE | Facility: HOSPITAL | Age: 63
End: 2023-08-24
Payer: MEDICARE

## 2023-08-24 ENCOUNTER — TELEPHONE (OUTPATIENT)
Dept: FAMILY MEDICINE | Facility: CLINIC | Age: 63
End: 2023-08-24
Payer: MEDICARE

## 2023-08-24 VITALS — SYSTOLIC BLOOD PRESSURE: 120 MMHG | DIASTOLIC BLOOD PRESSURE: 71 MMHG

## 2023-08-24 DIAGNOSIS — Z12.31 ENCOUNTER FOR SCREENING MAMMOGRAM FOR MALIGNANT NEOPLASM OF BREAST: Primary | ICD-10-CM

## 2023-08-24 DIAGNOSIS — Z12.12 ENCOUNTER FOR SCREENING FOR COLORECTAL MALIGNANT NEOPLASM: ICD-10-CM

## 2023-08-24 DIAGNOSIS — Z12.11 ENCOUNTER FOR SCREENING FOR COLORECTAL MALIGNANT NEOPLASM: ICD-10-CM

## 2023-08-24 NOTE — PROGRESS NOTES
Overdue Mammogram - appointment scheduled on 08/31/2023.    Colonoscopy will be due soon - ambulatory referral placed and an appointment will scheduled by the patient via the [employer] portal.    Non-Compliant Blood Pressure Reading - the patient has a compliant blood pressure of 120/71 and pulse = 72 taken on 08/22/2023. BP recorded. The patient is active on Digital Hypertension.

## 2023-08-31 ENCOUNTER — HOSPITAL ENCOUNTER (OUTPATIENT)
Dept: RADIOLOGY | Facility: HOSPITAL | Age: 63
Discharge: HOME | End: 2023-08-31
Attending: INTERNAL MEDICINE
Payer: MEDICARE

## 2023-08-31 DIAGNOSIS — Z12.31 ENCOUNTER FOR SCREENING MAMMOGRAM FOR MALIGNANT NEOPLASM OF BREAST: ICD-10-CM

## 2023-08-31 PROCEDURE — 77063 BREAST TOMOSYNTHESIS BI: CPT | Mod: 26,HCNC,, | Performed by: RADIOLOGY

## 2023-08-31 PROCEDURE — 77067 MAMMO DIGITAL SCREENING BILAT WITH TOMO: ICD-10-PCS | Mod: 26,HCNC,, | Performed by: RADIOLOGY

## 2023-08-31 PROCEDURE — 77063 MAMMO DIGITAL SCREENING BILAT WITH TOMO: ICD-10-PCS | Mod: 26,HCNC,, | Performed by: RADIOLOGY

## 2023-08-31 PROCEDURE — 77067 SCR MAMMO BI INCL CAD: CPT | Mod: 26,HCNC,, | Performed by: RADIOLOGY

## 2023-08-31 PROCEDURE — 77067 SCR MAMMO BI INCL CAD: CPT | Mod: TC,HCNC,PO

## 2023-09-01 ENCOUNTER — TELEPHONE (OUTPATIENT)
Dept: ENDOSCOPY | Facility: HOSPITAL | Age: 63
End: 2023-09-01

## 2023-09-01 ENCOUNTER — CLINICAL SUPPORT (OUTPATIENT)
Dept: ENDOSCOPY | Facility: HOSPITAL | Age: 63
End: 2023-09-01
Attending: INTERNAL MEDICINE
Payer: MEDICARE

## 2023-09-01 DIAGNOSIS — Z12.12 ENCOUNTER FOR SCREENING FOR COLORECTAL MALIGNANT NEOPLASM: ICD-10-CM

## 2023-09-01 DIAGNOSIS — Z12.11 ENCOUNTER FOR SCREENING FOR COLORECTAL MALIGNANT NEOPLASM: ICD-10-CM

## 2023-09-01 NOTE — TELEPHONE ENCOUNTER
Contacted patient to schedule Colonoscopy and she is hearing impaired. Pt states her  is hearing impaired also and her son is at work and not available to speak to at this time so was unavailable to schedule her colonoscopy. Patient's son will need to be contacted or patient will need to be scheduled in person.

## 2023-09-21 DIAGNOSIS — H69.91 EUSTACHIAN TUBE DYSFUNCTION, RIGHT: ICD-10-CM

## 2023-09-21 RX ORDER — CETIRIZINE HYDROCHLORIDE 10 MG/1
10 TABLET ORAL DAILY PRN
Qty: 90 TABLET | Refills: 3 | Status: SHIPPED | OUTPATIENT
Start: 2023-09-21 | End: 2024-09-20

## 2023-09-26 ENCOUNTER — TELEPHONE (OUTPATIENT)
Dept: ADMINISTRATIVE | Facility: CLINIC | Age: 63
End: 2023-09-26
Payer: MEDICARE

## 2023-09-28 ENCOUNTER — OFFICE VISIT (OUTPATIENT)
Dept: INTERNAL MEDICINE | Facility: CLINIC | Age: 63
End: 2023-09-28
Payer: MEDICARE

## 2023-09-28 VITALS
HEIGHT: 64 IN | BODY MASS INDEX: 31.47 KG/M2 | DIASTOLIC BLOOD PRESSURE: 82 MMHG | SYSTOLIC BLOOD PRESSURE: 160 MMHG | OXYGEN SATURATION: 97 % | WEIGHT: 184.31 LBS | HEART RATE: 88 BPM

## 2023-09-28 DIAGNOSIS — Z00.00 ENCOUNTER FOR PREVENTIVE HEALTH EXAMINATION: Primary | ICD-10-CM

## 2023-09-28 DIAGNOSIS — D58.2 HEMOGLOBIN C TRAIT: Chronic | ICD-10-CM

## 2023-09-28 DIAGNOSIS — N18.31 CHRONIC KIDNEY DISEASE, STAGE 3A: Chronic | ICD-10-CM

## 2023-09-28 DIAGNOSIS — N25.81 SECONDARY HYPERPARATHYROIDISM: ICD-10-CM

## 2023-09-28 DIAGNOSIS — Z12.11 COLON CANCER SCREENING: ICD-10-CM

## 2023-09-28 DIAGNOSIS — F33.41 RECURRENT MAJOR DEPRESSIVE DISORDER, IN PARTIAL REMISSION: Chronic | ICD-10-CM

## 2023-09-28 DIAGNOSIS — D58.2 PRESENCE OF HEMOGLOBIN DELTA CHAIN VARIANT: Chronic | ICD-10-CM

## 2023-09-28 DIAGNOSIS — Z00.00 ENCOUNTER FOR MEDICARE ANNUAL WELLNESS EXAM: ICD-10-CM

## 2023-09-28 DIAGNOSIS — D47.2 MONOCLONAL GAMMOPATHY: ICD-10-CM

## 2023-09-28 DIAGNOSIS — R73.03 PREDIABETES: Chronic | ICD-10-CM

## 2023-09-28 DIAGNOSIS — E66.9 OBESITY (BMI 30-39.9): ICD-10-CM

## 2023-09-28 DIAGNOSIS — I77.1 TORTUOUS AORTA: Chronic | ICD-10-CM

## 2023-09-28 PROCEDURE — 3008F BODY MASS INDEX DOCD: CPT | Mod: HCNC,CPTII,S$GLB, | Performed by: NURSE PRACTITIONER

## 2023-09-28 PROCEDURE — 1159F MED LIST DOCD IN RCRD: CPT | Mod: HCNC,CPTII,S$GLB, | Performed by: NURSE PRACTITIONER

## 2023-09-28 PROCEDURE — 3077F SYST BP >= 140 MM HG: CPT | Mod: HCNC,CPTII,S$GLB, | Performed by: NURSE PRACTITIONER

## 2023-09-28 PROCEDURE — 99999 PR PBB SHADOW E&M-EST. PATIENT-LVL V: ICD-10-PCS | Mod: PBBFAC,HCNC,, | Performed by: NURSE PRACTITIONER

## 2023-09-28 PROCEDURE — 3044F PR MOST RECENT HEMOGLOBIN A1C LEVEL <7.0%: ICD-10-PCS | Mod: HCNC,CPTII,S$GLB, | Performed by: NURSE PRACTITIONER

## 2023-09-28 PROCEDURE — 1160F RVW MEDS BY RX/DR IN RCRD: CPT | Mod: HCNC,CPTII,S$GLB, | Performed by: NURSE PRACTITIONER

## 2023-09-28 PROCEDURE — 3077F PR MOST RECENT SYSTOLIC BLOOD PRESSURE >= 140 MM HG: ICD-10-PCS | Mod: HCNC,CPTII,S$GLB, | Performed by: NURSE PRACTITIONER

## 2023-09-28 PROCEDURE — G0439 PPPS, SUBSEQ VISIT: HCPCS | Mod: HCNC,S$GLB,, | Performed by: NURSE PRACTITIONER

## 2023-09-28 PROCEDURE — 1160F PR REVIEW ALL MEDS BY PRESCRIBER/CLIN PHARMACIST DOCUMENTED: ICD-10-PCS | Mod: HCNC,CPTII,S$GLB, | Performed by: NURSE PRACTITIONER

## 2023-09-28 PROCEDURE — 1159F PR MEDICATION LIST DOCUMENTED IN MEDICAL RECORD: ICD-10-PCS | Mod: HCNC,CPTII,S$GLB, | Performed by: NURSE PRACTITIONER

## 2023-09-28 PROCEDURE — 3008F PR BODY MASS INDEX (BMI) DOCUMENTED: ICD-10-PCS | Mod: HCNC,CPTII,S$GLB, | Performed by: NURSE PRACTITIONER

## 2023-09-28 PROCEDURE — G0439 PR MEDICARE ANNUAL WELLNESS SUBSEQUENT VISIT: ICD-10-PCS | Mod: HCNC,S$GLB,, | Performed by: NURSE PRACTITIONER

## 2023-09-28 PROCEDURE — 3079F DIAST BP 80-89 MM HG: CPT | Mod: HCNC,CPTII,S$GLB, | Performed by: NURSE PRACTITIONER

## 2023-09-28 PROCEDURE — 3044F HG A1C LEVEL LT 7.0%: CPT | Mod: HCNC,CPTII,S$GLB, | Performed by: NURSE PRACTITIONER

## 2023-09-28 PROCEDURE — 99999 PR PBB SHADOW E&M-EST. PATIENT-LVL V: CPT | Mod: PBBFAC,HCNC,, | Performed by: NURSE PRACTITIONER

## 2023-09-28 PROCEDURE — 3079F PR MOST RECENT DIASTOLIC BLOOD PRESSURE 80-89 MM HG: ICD-10-PCS | Mod: HCNC,CPTII,S$GLB, | Performed by: NURSE PRACTITIONER

## 2023-09-28 NOTE — PROGRESS NOTES
"  Omid Rascon presented for a  Medicare AWV and comprehensive Health Risk Assessment today. The following components were reviewed and updated:    Medical history  Family History  Social history  Allergies and Current Medications  Health Risk Assessment  Health Maintenance  Care Team         ** See Completed Assessments for Annual Wellness Visit within the encounter summary.**         The following assessments were completed:  Living Situation  CAGE  Depression Screening  Timed Get Up and Go--not done walks with a cane back problems from old back injury  Whisper Test--not done hearing impairment  Cognitive Function Screening--pt declined to do the screening  Nutrition Screening  ADL Screening  PAQ Screening        Vitals:    09/28/23 1011   BP: (!) 160/82   BP Location: Left arm   Patient Position: Sitting   BP Method: Medium (Manual)   Pulse: 88   SpO2: 97%   Weight: 83.6 kg (184 lb 4.9 oz)   Height: 5' 4" (1.626 m)     Body mass index is 31.64 kg/m².  Physical Exam  Vitals and nursing note reviewed.   Constitutional:       Appearance: She is obese.   HENT:      Head: Normocephalic.      Ears:      Comments: Hard of hearing     Nose: Nose normal.      Mouth/Throat:      Mouth: Mucous membranes are moist.   Eyes:      Conjunctiva/sclera: Conjunctivae normal.   Cardiovascular:      Rate and Rhythm: Normal rate. Rhythm irregular.      Heart sounds: Normal heart sounds.   Pulmonary:      Effort: Pulmonary effort is normal.      Breath sounds: Normal breath sounds.   Musculoskeletal:         General: Normal range of motion.      Cervical back: Normal range of motion.   Skin:     General: Skin is warm and dry.   Neurological:      General: No focal deficit present.      Mental Status: She is alert and oriented to person, place, and time.   Psychiatric:         Mood and Affect: Mood is anxious. Affect is angry.         Speech: Speech is rapid and pressured.         Behavior: Behavior is agitated and aggressive.         " Thought Content: Thought content normal.         Cognition and Memory: Cognition and memory normal.         Judgment: Judgment normal.       Diagnoses and health risks identified today and associated recommendations/orders:    1. Encounter for preventive health examination  Exam done    Health Maintenance updated    Records reviewed    2. Encounter for Medicare annual wellness exam  - Ambulatory Referral/Consult to Enhanced Annual Wellness Visit (eAWV)    3. Recurrent major depressive disorder, in partial remission  Chronic, followed by PCP    4. Tortuous aorta  Chronic, followed by PCP    5. Hemoglobin C trait  Chronic, followed by PCP    6. Presence of hemoglobin delta chain variant  Chronic, followed by PCP    7. Monoclonal gammopathy  Chronic, followed by PCP    8. Secondary hyperparathyroidism  Chronic, followed by PCP    9. Prediabetes  Chronic, followed by PCP    Follow low carb, low fat, high fiber diet and exercise to prevent the development of T2DM    10. Chronic kidney disease, stage 3a  Chronic, followed by PCP    Goal BP < 140/80, LDL goal < 100, low salt diet, avoid otc NSAIDs.    11. Colon cancer screening  - Ambulatory referral/consult to Endo Procedure ; Future    12. BMI 31.0-31.9,adult  BMI reviewed    13. Obesity (BMI 30-39.9)  BMI reviewed.    Diet and exercise to lose weight.    Provided Vasilca with a 5-10 year written screening schedule and personal prevention plan. Recommendations were developed using the USPSTF age appropriate recommendations. Education, counseling, and referrals were provided as needed. After Visit Summary printed and given to patient which includes a list of additional screenings\tests needed.    Follow up in about 7 months (around 4/22/2024) for with PCP Dr. Hendrickson as scheduled.    Jaqueline Christensen DNP      I offered to discuss advanced care planning, including how to pick a person who would make decisions for you if you were unable to make them for yourself,  called a health care power of , and what kind of decisions you might make such as use of life sustaining treatments such as ventilators and tube feeding when faced with a life limiting illness recorded on a living will that they will need to know. (How you want to be cared for as you near the end of your natural life)     X Patient is interested in learning more about how to make advanced directives.  I provided them paperwork and offered to discuss this with them.

## 2023-09-28 NOTE — PATIENT INSTRUCTIONS
Counseling and Referral of Other Preventative  (Italic type indicates deductible and co-insurance are waived)    Patient Name: Omid Rascon  Today's Date: 9/28/2023    Health Maintenance         Date Due Completion Date    COVID-19 Vaccine (4 - Pfizer series) Will get when available 12/9/2021    Colorectal Cancer Screening Endoscopy will contact you to schedule your colonoscopy   9/14/2022    Override on 8/1/2010: Done (reportedly normal per patient)    Hemoglobin A1c (Prediabetes) 04/20/2024 4/20/2023    Mammogram 08/31/2024 8/31/2023    TETANUS VACCINE 04/10/2027 4/10/2017    Lipid Panel 04/20/2028 4/20/2023          Orders Placed This Encounter   Procedures    Ambulatory referral/consult to Endo Procedure      The following information is provided to all patients.  This information is to help you find resources for any of the problems found today that may be affecting your health:                Living healthy guide: www.[facility].[place].[facility]      Understanding Diabetes: www.diabetes.org      Eating healthy: www.cdc.gov/healthyweight      CDC home safety checklist: www.cdc.gov/steadi/patient.html      Agency on Aging: www.goea.[place].[facility]      Alcoholics anonymous (AA): www.aa.org      Physical Activity: www.luna.nih.gov/kv0duwk      Tobacco use: www.quitwithusla.org

## 2023-10-19 ENCOUNTER — PATIENT MESSAGE (OUTPATIENT)
Dept: ADMINISTRATIVE | Facility: OTHER | Age: 63
End: 2023-10-19
Payer: MEDICARE

## 2023-11-13 ENCOUNTER — PATIENT MESSAGE (OUTPATIENT)
Dept: ADMINISTRATIVE | Facility: HOSPITAL | Age: 63
End: 2023-11-13
Payer: MEDICARE

## 2023-12-18 ENCOUNTER — PATIENT MESSAGE (OUTPATIENT)
Dept: ADMINISTRATIVE | Facility: OTHER | Age: 63
End: 2023-12-18
Payer: MEDICARE

## 2024-01-31 ENCOUNTER — OFFICE VISIT (OUTPATIENT)
Dept: OPTOMETRY | Facility: CLINIC | Age: 64
End: 2024-01-31
Payer: MEDICARE

## 2024-01-31 DIAGNOSIS — H52.7 REFRACTIVE ERROR: ICD-10-CM

## 2024-01-31 DIAGNOSIS — H25.13 NUCLEAR SCLEROSIS OF BOTH EYES: Primary | ICD-10-CM

## 2024-01-31 PROCEDURE — 1159F MED LIST DOCD IN RCRD: CPT | Mod: HCNC,CPTII,S$GLB, | Performed by: OPTOMETRIST

## 2024-01-31 PROCEDURE — 92014 COMPRE OPH EXAM EST PT 1/>: CPT | Mod: HCNC,S$GLB,, | Performed by: OPTOMETRIST

## 2024-01-31 PROCEDURE — 99999 PR PBB SHADOW E&M-EST. PATIENT-LVL II: CPT | Mod: PBBFAC,HCNC,, | Performed by: OPTOMETRIST

## 2024-01-31 PROCEDURE — 1160F RVW MEDS BY RX/DR IN RCRD: CPT | Mod: HCNC,CPTII,S$GLB, | Performed by: OPTOMETRIST

## 2024-01-31 PROCEDURE — 92015 DETERMINE REFRACTIVE STATE: CPT | Mod: HCNC,S$GLB,, | Performed by: OPTOMETRIST

## 2024-01-31 NOTE — PROGRESS NOTES
Subjective:       Patient ID: Omid Rascon is a 63 y.o. female      Chief Complaint   Patient presents with    Concerns About Ocular Health     History of Present Illness    Dls: 8/2/22 Dr. Sandoval     64 y/o female presents today for ocular health check.  Pt wears pal's.     No tearing  No itching  No burning  No pain  No ha's  No floaters  No flashes    Eye meds  None    Pohx:   None    Fohx:  None         Assessment/Plan:     1. Nuclear sclerosis of both eyes  Educated pt on presence of cataracts and effects on vision. No surgery at this time. Recheck in one year, sooner PRN.    2. Refractive error  Educated patient on refractive error and discussed lens options. Dispensed updated spectacle Rx. Educated about adaptation period to new specs.    Eyeglass Final Rx       Eyeglass Final Rx         Sphere Cylinder Axis Add    Right +1.50 +0.75 015 +2.50    Left +1.50 +0.50 165 +2.50      Expiration Date: 1/31/2025                      Follow up in about 1 year (around 1/31/2025).

## 2024-02-14 ENCOUNTER — PATIENT MESSAGE (OUTPATIENT)
Dept: RESEARCH | Facility: CLINIC | Age: 64
End: 2024-02-14
Payer: MEDICARE

## 2024-02-19 ENCOUNTER — PATIENT MESSAGE (OUTPATIENT)
Dept: ADMINISTRATIVE | Facility: HOSPITAL | Age: 64
End: 2024-02-19
Payer: MEDICARE

## 2024-02-22 ENCOUNTER — TELEPHONE (OUTPATIENT)
Dept: ENDOSCOPY | Facility: HOSPITAL | Age: 64
End: 2024-02-22
Payer: MEDICARE

## 2024-02-22 NOTE — TELEPHONE ENCOUNTER
Attempted to contact patient to schedule colonoscopy. The patient did not answer the call.  Left voice message  requesting a call back at [phone] to get procedure scheduled.

## 2024-03-01 ENCOUNTER — PATIENT MESSAGE (OUTPATIENT)
Dept: RESEARCH | Facility: CLINIC | Age: 64
End: 2024-03-01
Payer: MEDICARE

## 2024-03-01 ENCOUNTER — TELEPHONE (OUTPATIENT)
Dept: RESEARCH | Facility: CLINIC | Age: 64
End: 2024-03-01
Payer: MEDICARE

## 2024-03-01 NOTE — TELEPHONE ENCOUNTER
"[employer] HEALTH SURVEY REMINDER    Pre-Call Chart Review: Copy survey link here:     Calling from the [employer] study to remind pt its time to fill out their study surveys.   We recently sent the links to your email and [facility] accounts. Once the health surveys are filled out, which will take about 15 to 30 minutes, we will send the $50 check for completion.   For Voicemail: If you have any questions, please call the [employer] study team at [place]. Their number is  [phone] or email [email or web address].     During Call:  Do you need me to resend the survey links again?   If patient has time, pause to send to [facility] and wait for pt to confirm receipt.   Did not speak to pt/Did not ask  *preferred email:    Share info with pt:  [x] N/A -did not speak with pt  [] [place] will submit a check request for $50 once surveys are complete. Currently it takes the check 3 - 4 weeks to arrive, and may take longer during holidays.   [] Remember to  add [email or web address] to your email contacts because messages may end up in your Junk/Trash/Spam folders     Issues identified  FUp actions:  [x] N/A  [] "Survey has already been completed" (Save & Return)  Ask  [facility] to  create new link  [] Never received check for previous surveys  Confirm mailing address & Ask [facility] to look into it  Enter Preferred Mailing Address:  [] Other issue:     Call Result;   Left voicemail      Follow-Up Procedures: NA  []Share Updated contact info: Send secure (encrypted) email of any issues (e.g. preferreed mailing address, best email address)  []Unable to reach/speak to: Send survey reminder message through the portal if unable to speak to pt.   "

## 2024-03-09 NOTE — ASSESSMENT & PLAN NOTE
Continue sodium restriction, and avoidance of nephrotoxic agents.   
If persistent elevation melissa-operatively could consider using IV labetalol PRN while monitoring her pulse closely.  If her pulse is low, hydralazine can be used.   
Normal TSH.  Monitor   
normal...

## 2024-04-10 ENCOUNTER — CLINICAL SUPPORT (OUTPATIENT)
Dept: ENDOSCOPY | Facility: HOSPITAL | Age: 64
End: 2024-04-10
Payer: MEDICARE

## 2024-04-10 DIAGNOSIS — Z12.11 COLON CANCER SCREENING: ICD-10-CM

## 2024-04-10 NOTE — PLAN OF CARE
Patient was having a hard time hearing due to weather and her phone issues.  Requested another phone call back next week.  Main line phone number provided.  Scheduled follow up PAT appointment.

## 2024-04-17 ENCOUNTER — PATIENT MESSAGE (OUTPATIENT)
Dept: ADMINISTRATIVE | Facility: OTHER | Age: 64
End: 2024-04-17
Payer: MEDICARE

## 2024-04-18 ENCOUNTER — PATIENT MESSAGE (OUTPATIENT)
Dept: OBSTETRICS AND GYNECOLOGY | Facility: CLINIC | Age: 64
End: 2024-04-18
Payer: MEDICARE

## 2024-04-19 ENCOUNTER — OFFICE VISIT (OUTPATIENT)
Dept: OBSTETRICS AND GYNECOLOGY | Facility: CLINIC | Age: 64
End: 2024-04-19
Payer: MEDICARE

## 2024-04-19 VITALS
HEIGHT: 64 IN | WEIGHT: 177.69 LBS | BODY MASS INDEX: 30.34 KG/M2 | SYSTOLIC BLOOD PRESSURE: 200 MMHG | DIASTOLIC BLOOD PRESSURE: 130 MMHG

## 2024-04-19 DIAGNOSIS — N81.4 CYSTOCELE WITH PROLAPSE: Primary | ICD-10-CM

## 2024-04-19 PROCEDURE — 3077F SYST BP >= 140 MM HG: CPT | Mod: HCNC,CPTII,S$GLB, | Performed by: OBSTETRICS & GYNECOLOGY

## 2024-04-19 PROCEDURE — 1159F MED LIST DOCD IN RCRD: CPT | Mod: HCNC,CPTII,S$GLB, | Performed by: OBSTETRICS & GYNECOLOGY

## 2024-04-19 PROCEDURE — 99213 OFFICE O/P EST LOW 20 MIN: CPT | Mod: HCNC,S$GLB,, | Performed by: OBSTETRICS & GYNECOLOGY

## 2024-04-19 PROCEDURE — 3008F BODY MASS INDEX DOCD: CPT | Mod: HCNC,CPTII,S$GLB, | Performed by: OBSTETRICS & GYNECOLOGY

## 2024-04-19 PROCEDURE — 99999 PR PBB SHADOW E&M-EST. PATIENT-LVL III: CPT | Mod: PBBFAC,HCNC,, | Performed by: OBSTETRICS & GYNECOLOGY

## 2024-04-19 PROCEDURE — 3080F DIAST BP >= 90 MM HG: CPT | Mod: HCNC,CPTII,S$GLB, | Performed by: OBSTETRICS & GYNECOLOGY

## 2024-04-19 NOTE — PROGRESS NOTES
Gynecology    SUBJECTIVE:     Chief Complaint: Vaginal Prolapse       History of Present Illness:  63 year old who presents with complaints of vaginal prolapse.  Reports it is worse during the end of the day after standing for long periods of time and better in the morning.  She has difficulty doing what she likes because the prolapse can be such a problem- for instance she has limited travelling to see family.  See last visit for unfortunate situation when she travelled.      She has no bleeding.  She doesn't have severe pain, but does have discomfort.      The patient did see Dr. Jones and was scheduled for surgery that was cancelled.      Review of Systems:  Review of Systems   Genitourinary:  Negative for menorrhagia, menstrual problem, pelvic pain, vaginal bleeding, vaginal discharge, vaginal pain and vaginal odor.        OBJECTIVE:     Physical Exam:  Physical Exam  Vitals and nursing note reviewed.   Constitutional:       Appearance: She is well-developed.   Pulmonary:      Effort: Pulmonary effort is normal.   Genitourinary:     Vagina: No signs of injury and foreign body. Prolapsed vaginal walls present. No vaginal discharge, erythema, tenderness or bleeding.      Cervix: Normal.      Uterus: With uterine prolapse. Not deviated, not enlarged, not fixed and not tender.       Adnexa: Right adnexa normal and left adnexa normal.        Right: No mass, tenderness or fullness.          Left: No mass, tenderness or fullness.        Comments: Stage 4 cystocele  Skin:     Coloration: Skin is not pale.   Neurological:      Mental Status: She is oriented to person, place, and time.   Psychiatric:         Behavior: Behavior normal.         Thought Content: Thought content normal.         Judgment: Judgment normal.         Chaperoned by: Kali    ASSESSMENT:       ICD-10-CM ICD-9-CM    1. Cystocele with prolapse  N81.4 618.4 Ambulatory referral/consult to Urogynecology             Plan:      Omid was seen today  for vaginal prolapse.    Diagnoses and all orders for this visit:    Cystocele with prolapse  -     Ambulatory referral/consult to Urogynecology; Future    - referral placed to urogynecology to re establish care    Orders Placed This Encounter   Procedures    Ambulatory referral/consult to Urogynecology           Juhi Polanco

## 2024-04-22 ENCOUNTER — OFFICE VISIT (OUTPATIENT)
Dept: INTERNAL MEDICINE | Facility: CLINIC | Age: 64
End: 2024-04-22
Payer: MEDICARE

## 2024-04-22 VITALS
DIASTOLIC BLOOD PRESSURE: 98 MMHG | SYSTOLIC BLOOD PRESSURE: 160 MMHG | HEART RATE: 95 BPM | WEIGHT: 178.81 LBS | HEIGHT: 64 IN | RESPIRATION RATE: 10 BRPM | BODY MASS INDEX: 30.53 KG/M2 | OXYGEN SATURATION: 99 % | TEMPERATURE: 98 F

## 2024-04-22 DIAGNOSIS — R73.03 PREDIABETES: Chronic | ICD-10-CM

## 2024-04-22 DIAGNOSIS — D58.2 HEMOGLOBIN C TRAIT: Chronic | ICD-10-CM

## 2024-04-22 DIAGNOSIS — N18.31 CHRONIC KIDNEY DISEASE, STAGE 3A: Chronic | ICD-10-CM

## 2024-04-22 DIAGNOSIS — Z00.00 ROUTINE MEDICAL EXAM: Primary | ICD-10-CM

## 2024-04-22 DIAGNOSIS — I10 WHITE COAT SYNDROME WITH DIAGNOSIS OF HYPERTENSION: Chronic | ICD-10-CM

## 2024-04-22 DIAGNOSIS — I77.1 TORTUOUS AORTA: Chronic | ICD-10-CM

## 2024-04-22 DIAGNOSIS — H91.93 BILATERAL HEARING LOSS, UNSPECIFIED HEARING LOSS TYPE: Chronic | ICD-10-CM

## 2024-04-22 DIAGNOSIS — F33.41 RECURRENT MAJOR DEPRESSIVE DISORDER, IN PARTIAL REMISSION: ICD-10-CM

## 2024-04-22 DIAGNOSIS — E04.2 MULTINODULAR THYROID: Chronic | ICD-10-CM

## 2024-04-22 DIAGNOSIS — F43.21 GRIEF REACTION: ICD-10-CM

## 2024-04-22 DIAGNOSIS — N25.81 SECONDARY HYPERPARATHYROIDISM: ICD-10-CM

## 2024-04-22 PROCEDURE — 1160F RVW MEDS BY RX/DR IN RCRD: CPT | Mod: HCNC,CPTII,S$GLB, | Performed by: INTERNAL MEDICINE

## 2024-04-22 PROCEDURE — 3077F SYST BP >= 140 MM HG: CPT | Mod: HCNC,CPTII,S$GLB, | Performed by: INTERNAL MEDICINE

## 2024-04-22 PROCEDURE — 3080F DIAST BP >= 90 MM HG: CPT | Mod: HCNC,CPTII,S$GLB, | Performed by: INTERNAL MEDICINE

## 2024-04-22 PROCEDURE — 99999 PR PBB SHADOW E&M-EST. PATIENT-LVL IV: CPT | Mod: PBBFAC,HCNC,, | Performed by: INTERNAL MEDICINE

## 2024-04-22 PROCEDURE — 3008F BODY MASS INDEX DOCD: CPT | Mod: HCNC,CPTII,S$GLB, | Performed by: INTERNAL MEDICINE

## 2024-04-22 PROCEDURE — 1159F MED LIST DOCD IN RCRD: CPT | Mod: HCNC,CPTII,S$GLB, | Performed by: INTERNAL MEDICINE

## 2024-04-22 PROCEDURE — 99214 OFFICE O/P EST MOD 30 MIN: CPT | Mod: HCNC,25,S$GLB, | Performed by: INTERNAL MEDICINE

## 2024-04-22 PROCEDURE — 99396 PREV VISIT EST AGE 40-64: CPT | Mod: HCNC,S$GLB,, | Performed by: INTERNAL MEDICINE

## 2024-04-22 NOTE — Clinical Note
Good morning,  I just wanted to update you for the Endo scheduling standpoint that this patient has severe hearing loss.  She may need have difficulty with the telephone call.  Her  is also hearing impaired.   I don't know if there is another way to do this???  Sincerely, Rey

## 2024-04-22 NOTE — ASSESSMENT & PLAN NOTE
Recheck labs.  We have previously discussed taking statins but she is hesitant to start medications due to previous adverse effects to medications

## 2024-04-22 NOTE — PROGRESS NOTES
Assessment & Plan  Problem List Items Addressed This Visit          Psychiatric    Recurrent major depressive disorder, in partial remission (Chronic)    Current Assessment & Plan     Generally doing ok.  Some grief on top of her MDD.              ENT    Bilateral hearing loss (Chronic)    Overview     Reports being told she needs cochlear implant in the past            Cardiac/Vascular    White coat syndrome with diagnosis of hypertension (Chronic)    Overview     In Digital HTN program with excellent control.     If persistent elevation melissa-operatively could consider using IV Labetalol PRN while monitoring her pulse.  If bradycardic, hydralazine can be used         Tortuous aorta (Chronic)    Overview     Stable, asymptomatic chronic condition.  Will continue to maximize risk factor reduction and adjust medication as needed.          Current Assessment & Plan     Recheck labs.  We have previously discussed taking statins but she is hesitant to start medications due to previous adverse effects to medications         Relevant Orders    Comprehensive Metabolic Panel    Lipid Panel       Renal/    Chronic kidney disease, stage 3a (Chronic)    Overview     Reports adverse reactions to ACE-I in the past         Current Assessment & Plan     Continue sodium restriction, and avoidance of nephrotoxic agents. Recheck labs         Relevant Orders    Magnesium    Vitamin D    Phosphorus    PTH, Intact    Protein/Creatinine Ratio, Urine    Lipid Panel    Microalbumin/Creatinine Ratio, Urine       Oncology    Hemoglobin C trait (Chronic)    Overview     Followed by Heme Onc.  Follow up PRN         Current Assessment & Plan     Recheck CBC         Relevant Orders    CBC Auto Differential       Endocrine    Multinodular thyroid (Chronic)    Overview     Seen on MRI c-spine.  Reassuring US 2019 without findings needing US follow up         Secondary hyperparathyroidism (Chronic)    Current Assessment & Plan     Recheck labs          Prediabetes (Chronic)    Current Assessment & Plan     The patient is asked to make an attempt to improve diet and exercise patterns to aid in medical management of this problem. Recheck labs         Relevant Orders    Hemoglobin A1C    Lipid Panel     Other Visit Diagnoses       Routine medical exam    -  Primary  -    Discussed healthy diet, regular exercise, necessary labs, age appropriate cancer screening, and routine vaccinations.       Grief reaction    -  Doing ok overall.  Monitor             Addendum 4/24/24: Kidney Failure Risk Equation ([employer])    Kidney Failure Risk at 2 years: 1.3%    Kidney Failure Risk at 5 years: 3.9%    Lab Results   Component Value Date    MICALBCREAT 428.8 (H) 04/23/2024    CREATININE 1.3 04/23/2024        Health Maintenance reviewed, as above.  Declined vaccines.  Has C-scope and MMG ordered.    Follow-up: Follow up in about 6 months (around 10/22/2024) for follow up for chronic conditions.  ______________________________________________________________________    Chief Complaint  Chief Complaint   Patient presents with    Annual Exam     Pt states she's having some torso weakness       HPI  Omid Rascon is a 63 y.o. female with medical diagnoses as listed in the medical history and problem list that presents for routine Physical.  Pt is known to me with their last appointment April 2023.      She is enrolled in digital hypertension and continues to have excellent home readings.  She has known white coat HTN.     She has been having some increased grief as she has had several family members who have passed away in the last year.  Most recently her sister.    She reports having some difficulty with scheduling her colonoscopy.  It seems that this is regarding the pre admit telephone call in her hearing loss.    She has been in follow up with her gyn team    ROS  Review of Systems   Constitutional:  Negative for chills, fever and unexpected weight change.   HENT:   "Positive for hearing loss.    Eyes:  Negative for discharge.   Respiratory:  Negative for cough, chest tightness, shortness of breath and wheezing.    Cardiovascular:  Negative for chest pain, palpitations and leg swelling.   Gastrointestinal:  Negative for abdominal pain and blood in stool.   Genitourinary:  Negative for decreased urine volume, dysuria and hematuria.   Neurological:  Negative for dizziness, light-headedness and headaches.   Psychiatric/Behavioral:  Negative for decreased concentration, dysphoric mood, sleep disturbance and suicidal ideas.            Physical Exam  Vitals:    04/22/24 1005 04/22/24 1029   BP: (!) 168/98 (!) 160/98   Pulse: 95    Resp: 10    Temp: 98.3 °F (36.8 °C)    TempSrc: Oral    SpO2: 99%    Weight: 81.1 kg (178 lb 12.7 oz)    Height: 5' 4" (1.626 m)     Body mass index is 30.69 kg/m².  Weight: 81.1 kg (178 lb 12.7 oz)   Height: 5' 4" (162.6 cm)   Physical Exam  Constitutional:       General: She is not in acute distress.     Appearance: She is well-developed.   HENT:      Head: Normocephalic and atraumatic.   Eyes:      General: Lids are normal. No scleral icterus.     Conjunctiva/sclera: Conjunctivae normal.      Pupils: Pupils are equal, round, and reactive to light.   Neck:      Thyroid: No thyromegaly.      Vascular: No carotid bruit or JVD.   Cardiovascular:      Rate and Rhythm: Normal rate and regular rhythm.      Pulses: Normal pulses.      Heart sounds: Normal heart sounds. No murmur heard.     No friction rub. No S3 or S4 sounds.   Pulmonary:      Effort: Pulmonary effort is normal.      Breath sounds: Normal breath sounds. No wheezing, rhonchi or rales.   Abdominal:      General: Bowel sounds are normal.      Palpations: Abdomen is soft.      Tenderness: There is no abdominal tenderness.   Musculoskeletal:         General: No tenderness.      Cervical back: Full passive range of motion without pain and neck supple.      Right lower leg: No edema.      Left lower " leg: No edema.   Skin:     General: Skin is warm and dry.      Findings: No rash.   Neurological:      Mental Status: She is alert and oriented to person, place, and time.   Psychiatric:         Speech: Speech normal.         Behavior: Behavior normal.         Thought Content: Thought content normal.

## 2024-04-22 NOTE — PATIENT INSTRUCTIONS
We will plan for you to have some follow up in 6 months    If we need to see you sooner for a preop appointment we can certainly accommodate this for you.  We will wait to hear from your surgeons to see if this is needed

## 2024-04-22 NOTE — ASSESSMENT & PLAN NOTE
The patient is asked to make an attempt to improve diet and exercise patterns to aid in medical management of this problem. Recheck labs

## 2024-04-23 ENCOUNTER — PATIENT MESSAGE (OUTPATIENT)
Dept: INTERNAL MEDICINE | Facility: CLINIC | Age: 64
End: 2024-04-23
Payer: MEDICARE

## 2024-04-23 ENCOUNTER — LAB VISIT (OUTPATIENT)
Dept: LAB | Facility: HOSPITAL | Age: 64
End: 2024-04-23
Attending: INTERNAL MEDICINE
Payer: MEDICARE

## 2024-04-23 DIAGNOSIS — I77.1 TORTUOUS AORTA: Chronic | ICD-10-CM

## 2024-04-23 DIAGNOSIS — R73.03 PREDIABETES: Chronic | ICD-10-CM

## 2024-04-23 DIAGNOSIS — D58.2 HEMOGLOBIN C TRAIT: Chronic | ICD-10-CM

## 2024-04-23 DIAGNOSIS — N18.31 CHRONIC KIDNEY DISEASE, STAGE 3A: Chronic | ICD-10-CM

## 2024-04-23 LAB
25(OH)D3+25(OH)D2 SERPL-MCNC: 60 NG/ML (ref 30–96)
ALBUMIN SERPL BCP-MCNC: 4.1 G/DL (ref 3.5–5.2)
ALBUMIN/CREAT UR: 428.8 UG/MG (ref 0–30)
ALP SERPL-CCNC: 55 U/L (ref 55–135)
ALT SERPL W/O P-5'-P-CCNC: 18 U/L (ref 10–44)
ANION GAP SERPL CALC-SCNC: 8 MMOL/L (ref 8–16)
AST SERPL-CCNC: 20 U/L (ref 10–40)
BASOPHILS # BLD AUTO: 0.05 K/UL (ref 0–0.2)
BASOPHILS NFR BLD: 1.1 % (ref 0–1.9)
BILIRUB SERPL-MCNC: 0.5 MG/DL (ref 0.1–1)
BUN SERPL-MCNC: 18 MG/DL (ref 8–23)
CALCIUM SERPL-MCNC: 10.3 MG/DL (ref 8.7–10.5)
CHLORIDE SERPL-SCNC: 104 MMOL/L (ref 95–110)
CHOLEST SERPL-MCNC: 193 MG/DL (ref 120–199)
CHOLEST/HDLC SERPL: 3.1 {RATIO} (ref 2–5)
CO2 SERPL-SCNC: 27 MMOL/L (ref 23–29)
CREAT SERPL-MCNC: 1.3 MG/DL (ref 0.5–1.4)
CREAT UR-MCNC: 52 MG/DL (ref 15–325)
CREAT UR-MCNC: 52 MG/DL (ref 15–325)
DIFFERENTIAL METHOD BLD: ABNORMAL
EOSINOPHIL # BLD AUTO: 0.1 K/UL (ref 0–0.5)
EOSINOPHIL NFR BLD: 1.8 % (ref 0–8)
ERYTHROCYTE [DISTWIDTH] IN BLOOD BY AUTOMATED COUNT: 14.6 % (ref 11.5–14.5)
EST. GFR  (NO RACE VARIABLE): 45.9 ML/MIN/1.73 M^2
ESTIMATED AVG GLUCOSE: 120 MG/DL (ref 68–131)
GLUCOSE SERPL-MCNC: 85 MG/DL (ref 70–110)
HBA1C MFR BLD: 5.8 % (ref 4–5.6)
HCT VFR BLD AUTO: 36.8 % (ref 37–48.5)
HDLC SERPL-MCNC: 63 MG/DL (ref 40–75)
HDLC SERPL: 32.6 % (ref 20–50)
HGB BLD-MCNC: 12.3 G/DL (ref 12–16)
IMM GRANULOCYTES # BLD AUTO: 0 K/UL (ref 0–0.04)
IMM GRANULOCYTES NFR BLD AUTO: 0 % (ref 0–0.5)
LDLC SERPL CALC-MCNC: 114.6 MG/DL (ref 63–159)
LYMPHOCYTES # BLD AUTO: 1.7 K/UL (ref 1–4.8)
LYMPHOCYTES NFR BLD: 38.5 % (ref 18–48)
MAGNESIUM SERPL-MCNC: 2 MG/DL (ref 1.6–2.6)
MCH RBC QN AUTO: 26.8 PG (ref 27–31)
MCHC RBC AUTO-ENTMCNC: 33.4 G/DL (ref 32–36)
MCV RBC AUTO: 80 FL (ref 82–98)
MICROALBUMIN UR DL<=1MG/L-MCNC: 223 UG/ML
MONOCYTES # BLD AUTO: 0.5 K/UL (ref 0.3–1)
MONOCYTES NFR BLD: 10.3 % (ref 4–15)
NEUTROPHILS # BLD AUTO: 2.1 K/UL (ref 1.8–7.7)
NEUTROPHILS NFR BLD: 48.3 % (ref 38–73)
NONHDLC SERPL-MCNC: 130 MG/DL
NRBC BLD-RTO: 0 /100 WBC
PHOSPHATE SERPL-MCNC: 3.7 MG/DL (ref 2.7–4.5)
PLATELET # BLD AUTO: 198 K/UL (ref 150–450)
PMV BLD AUTO: 11.1 FL (ref 9.2–12.9)
POTASSIUM SERPL-SCNC: 3.6 MMOL/L (ref 3.5–5.1)
PROT SERPL-MCNC: 8.2 G/DL (ref 6–8.4)
PROT UR-MCNC: 32 MG/DL (ref 0–15)
PROT/CREAT UR: 0.62 MG/G{CREAT} (ref 0–0.2)
PTH-INTACT SERPL-MCNC: 105.7 PG/ML (ref 9–77)
RBC # BLD AUTO: 4.59 M/UL (ref 4–5.4)
SODIUM SERPL-SCNC: 139 MMOL/L (ref 136–145)
TRIGL SERPL-MCNC: 77 MG/DL (ref 30–150)
WBC # BLD AUTO: 4.36 K/UL (ref 3.9–12.7)

## 2024-04-23 PROCEDURE — 83036 HEMOGLOBIN GLYCOSYLATED A1C: CPT | Mod: HCNC | Performed by: INTERNAL MEDICINE

## 2024-04-23 PROCEDURE — 80053 COMPREHEN METABOLIC PANEL: CPT | Mod: HCNC | Performed by: INTERNAL MEDICINE

## 2024-04-23 PROCEDURE — 85025 COMPLETE CBC W/AUTO DIFF WBC: CPT | Mod: HCNC | Performed by: INTERNAL MEDICINE

## 2024-04-23 PROCEDURE — 82570 ASSAY OF URINE CREATININE: CPT | Mod: HCNC | Performed by: INTERNAL MEDICINE

## 2024-04-23 PROCEDURE — 82043 UR ALBUMIN QUANTITATIVE: CPT | Mod: HCNC | Performed by: INTERNAL MEDICINE

## 2024-04-23 PROCEDURE — 82306 VITAMIN D 25 HYDROXY: CPT | Mod: HCNC | Performed by: INTERNAL MEDICINE

## 2024-04-23 PROCEDURE — 83735 ASSAY OF MAGNESIUM: CPT | Mod: HCNC | Performed by: INTERNAL MEDICINE

## 2024-04-23 PROCEDURE — 36415 COLL VENOUS BLD VENIPUNCTURE: CPT | Mod: HCNC,PO | Performed by: INTERNAL MEDICINE

## 2024-04-23 PROCEDURE — 83970 ASSAY OF PARATHORMONE: CPT | Mod: HCNC | Performed by: INTERNAL MEDICINE

## 2024-04-23 PROCEDURE — 84100 ASSAY OF PHOSPHORUS: CPT | Mod: HCNC | Performed by: INTERNAL MEDICINE

## 2024-04-23 PROCEDURE — 80061 LIPID PANEL: CPT | Mod: HCNC | Performed by: INTERNAL MEDICINE

## 2024-04-24 ENCOUNTER — PATIENT MESSAGE (OUTPATIENT)
Dept: INTERNAL MEDICINE | Facility: CLINIC | Age: 64
End: 2024-04-24
Payer: MEDICARE

## 2024-04-24 DIAGNOSIS — N18.31 CHRONIC KIDNEY DISEASE, STAGE 3A: Primary | Chronic | ICD-10-CM

## 2024-04-24 RX ORDER — LOSARTAN POTASSIUM 50 MG/1
50 TABLET ORAL DAILY
Qty: 90 TABLET | Refills: 3 | Status: SHIPPED | OUTPATIENT
Start: 2024-04-24 | End: 2025-04-24

## 2024-04-25 DIAGNOSIS — N18.31 CHRONIC KIDNEY DISEASE, STAGE 3A: Chronic | ICD-10-CM

## 2024-04-25 RX ORDER — LOSARTAN POTASSIUM 50 MG/1
50 TABLET ORAL DAILY
Qty: 90 TABLET | Refills: 3 | OUTPATIENT
Start: 2024-04-25 | End: 2025-04-25

## 2024-04-25 NOTE — TELEPHONE ENCOUNTER
No care due was identified.  Health [facility] Embedded Care Due Messages. Reference number: [id number].   4/25/2024 8:23:41 AM CDT

## 2024-04-29 ENCOUNTER — TELEPHONE (OUTPATIENT)
Dept: NEPHROLOGY | Facility: CLINIC | Age: 64
End: 2024-04-29
Payer: MEDICARE

## 2024-05-01 DIAGNOSIS — Z78.0 MENOPAUSE: ICD-10-CM

## 2024-05-14 ENCOUNTER — HOSPITAL ENCOUNTER (OUTPATIENT)
Dept: RADIOLOGY | Facility: CLINIC | Age: 64
Discharge: HOME | End: 2024-05-14
Attending: INTERNAL MEDICINE
Payer: MEDICARE

## 2024-05-14 DIAGNOSIS — Z78.0 MENOPAUSE: ICD-10-CM

## 2024-05-14 PROCEDURE — 77080 DXA BONE DENSITY AXIAL: CPT | Mod: TC,HCNC,PO

## 2024-05-14 PROCEDURE — 77080 DXA BONE DENSITY AXIAL: CPT | Mod: 26,HCNC,, | Performed by: INTERNAL MEDICINE

## 2024-05-17 ENCOUNTER — PATIENT MESSAGE (OUTPATIENT)
Dept: INTERNAL MEDICINE | Facility: CLINIC | Age: 64
End: 2024-05-17
Payer: MEDICARE

## 2024-05-17 ENCOUNTER — TELEPHONE (OUTPATIENT)
Dept: NEPHROLOGY | Facility: CLINIC | Age: 64
End: 2024-05-17
Payer: MEDICARE

## 2024-05-17 DIAGNOSIS — M81.0 AGE-RELATED OSTEOPOROSIS WITHOUT CURRENT PATHOLOGICAL FRACTURE: Primary | Chronic | ICD-10-CM

## 2024-05-17 RX ORDER — ALENDRONATE SODIUM 70 MG/1
70 TABLET ORAL
Qty: 12 TABLET | Refills: 3 | Status: SHIPPED | OUTPATIENT
Start: 2024-05-17

## 2024-05-23 ENCOUNTER — TELEPHONE (OUTPATIENT)
Dept: UROGYNECOLOGY | Facility: CLINIC | Age: 64
End: 2024-05-23
Payer: MEDICARE

## 2024-06-10 ENCOUNTER — PATIENT MESSAGE (OUTPATIENT)
Dept: INTERNAL MEDICINE | Facility: CLINIC | Age: 64
End: 2024-06-10
Payer: MEDICARE

## 2024-06-13 ENCOUNTER — PATIENT MESSAGE (OUTPATIENT)
Facility: CLINIC | Age: 64
End: 2024-06-13
Payer: MEDICARE

## 2024-06-20 ENCOUNTER — TELEPHONE (OUTPATIENT)
Dept: ENDOSCOPY | Facility: HOSPITAL | Age: 64
End: 2024-06-20

## 2024-06-20 ENCOUNTER — CLINICAL SUPPORT (OUTPATIENT)
Dept: ENDOSCOPY | Facility: HOSPITAL | Age: 64
End: 2024-06-20
Payer: MEDICARE

## 2024-06-20 VITALS — WEIGHT: 178.56 LBS | HEIGHT: 64 IN | BODY MASS INDEX: 30.48 KG/M2

## 2024-06-20 DIAGNOSIS — Z12.11 COLON CANCER SCREENING: ICD-10-CM

## 2024-06-20 NOTE — PLAN OF CARE
Attempted to reach Pt's Son to help with Scheduling. ColonoscopyHe did not answer. Left VM message to return call to schedule.

## 2024-06-20 NOTE — TELEPHONE ENCOUNTER
Pt called to informed us to please reach out to her when the November schedule open she does not want to be reach until then

## 2024-07-11 ENCOUNTER — TELEPHONE (OUTPATIENT)
Facility: CLINIC | Age: 64
End: 2024-07-11
Payer: MEDICARE

## 2024-07-11 NOTE — TELEPHONE ENCOUNTER
Cancelling order for CKD Basic Education due to multiple attempts to contact patient. Notified provider. Please resend referral in the future if patient expresses interest in attending.    : Kidney Disease Education History  Order Specific Questions   Reason for Referral:   CKD: Basic Education            CKD Stages:   CKD stages 1-3 (not covered by CMS/INS)            Request History   Action Date and Time User Details      Request Created 04/24/2024 07:50 Ti Hendrickson MD        Patient Called 06/13/2024 13:03 Suzette Greer Message: Details      Patient Called 06/25/2024 14:52 Suzette Greer Left Message: Details      Patient Called 07/11/2024 12:10 Suzette Greer Left Message: Details  LVM to call if interested in CKD education

## 2024-07-15 ENCOUNTER — PATIENT MESSAGE (OUTPATIENT)
Dept: ADMINISTRATIVE | Facility: HOSPITAL | Age: 64
End: 2024-07-15
Payer: MEDICARE

## 2024-08-07 ENCOUNTER — PATIENT MESSAGE (OUTPATIENT)
Dept: RESEARCH | Facility: CLINIC | Age: 64
End: 2024-08-07
Payer: MEDICARE

## 2024-08-21 ENCOUNTER — PATIENT MESSAGE (OUTPATIENT)
Dept: RESEARCH | Facility: CLINIC | Age: 64
End: 2024-08-21
Payer: MEDICARE

## 2024-08-27 ENCOUNTER — TELEPHONE (OUTPATIENT)
Dept: RESEARCH | Facility: CLINIC | Age: 64
End: 2024-08-27
Payer: MEDICARE

## 2024-08-27 ENCOUNTER — PATIENT MESSAGE (OUTPATIENT)
Dept: RESEARCH | Facility: CLINIC | Age: 64
End: 2024-08-27
Payer: MEDICARE

## 2024-08-27 NOTE — TELEPHONE ENCOUNTER
"[employer] HEALTH SURVEY REMINDER    Pre-Call Chart Review: Copy survey link here:     Calling from the [employer] study to remind pt its time to fill out their study surveys.   We recently sent the links to your email and [facility] accounts. Once the health surveys are filled out, which will take about 15 to 30 minutes, we will send the $50 check for completion.   For Voicemail: If you have any questions, please call the [employer] study team at [place]. Their number is  [phone] or email [email or web address].     During Call:  Do you need me to resend the survey links again?   If patient has time, pause to send to [facility] and wait for pt to confirm receipt.   Did not speak to pt/Did not ask  *preferred email:    Share info with pt:   [x] N/A -did not speak with pt  [] [place] will submit a check request for $50 once surveys are complete. Currently it takes the check 3 - 4 weeks to arrive, and may take longer during holidays.   [] Remember to  add [email or web address] to your email contacts because messages may end up in your Junk/Trash/Spam folders     Issues identified  FUp actions:   [x] N/A  [] "Survey has already been completed" (Save & Return)  Ask  [facility] to  create new link  [] Never received check for previous surveys  Confirm mailing address & Ask [facility] to look into it  Enter Preferred Mailing Address:  [] Other issue:     Call Result;   Left voicemail      Follow-Up Procedures: NA  []Share Updated contact info: Send secure (encrypted) email of any issues (e.g. [employer]reed mailing address, best email address)  []Unable to reach/speak to: Send survey reminder message through the portal if unable to speak to pt.   "

## 2024-09-03 ENCOUNTER — HOSPITAL ENCOUNTER (OUTPATIENT)
Dept: RADIOLOGY | Facility: HOSPITAL | Age: 64
Discharge: HOME | End: 2024-09-03
Attending: INTERNAL MEDICINE
Payer: MEDICARE

## 2024-09-03 DIAGNOSIS — Z12.31 ENCOUNTER FOR SCREENING MAMMOGRAM FOR BREAST CANCER: ICD-10-CM

## 2024-09-03 PROCEDURE — 77063 BREAST TOMOSYNTHESIS BI: CPT | Mod: 26,HCNC,, | Performed by: RADIOLOGY

## 2024-09-03 PROCEDURE — 77067 SCR MAMMO BI INCL CAD: CPT | Mod: TC,HCNC,PO

## 2024-09-03 PROCEDURE — 77067 SCR MAMMO BI INCL CAD: CPT | Mod: 26,HCNC,, | Performed by: RADIOLOGY

## 2024-09-09 ENCOUNTER — TELEPHONE (OUTPATIENT)
Dept: ENDOSCOPY | Facility: HOSPITAL | Age: 64
End: 2024-09-09
Payer: MEDICARE

## 2024-09-09 VITALS — BODY MASS INDEX: 30.39 KG/M2 | HEIGHT: 64 IN | WEIGHT: 178 LBS

## 2024-09-09 DIAGNOSIS — Z12.11 SPECIAL SCREENING FOR MALIGNANT NEOPLASMS, COLON: Primary | ICD-10-CM

## 2024-09-09 DIAGNOSIS — Z12.11 COLON CANCER SCREENING: ICD-10-CM

## 2024-09-09 NOTE — TELEPHONE ENCOUNTER
Patient's son calling to schedule his mother's colonoscopy. Patient's son requesting WB only. Offered first available date of 9/16/24. Patient's son declined and requested a date in October.

## 2024-09-09 NOTE — TELEPHONE ENCOUNTER
Spoke to patient to schedule procedure(s) Colonoscopy       Physician to perform procedure(s) Dr. JONNATHAN Chacon   Date of Procedure (s) 10/8/24  Arrival Time 12:00 PM  Time of Procedure(s) 1:00 PM   Location of Procedure(s) [address]   Type of Rx Prep sent to patient: PEG  Instructions provided to patient via [facility]    Patient was informed on the following information and verbalized understanding. Screening questionnaire reviewed with patient and complete. If procedure requires anesthesia, a responsible adult needs to be present to accompany the patient home, patient cannot drive after receiving anesthesia. Appointment details are tentative, especially check-in time. Patient will receive a prep-op call 7 days prior to confirm check-in time for procedure. If applicable the patient should contact their pharmacy to verify Rx for procedure prep is ready for pick-up. Patient was advised to call the scheduling department at [phone] if pharmacy states no Rx is available. Patient was advised to call the endoscopy scheduling department if any questions or concerns arise.      [facility] Scheduling Department

## 2024-10-01 ENCOUNTER — TELEPHONE (OUTPATIENT)
Dept: ENDOSCOPY | Facility: HOSPITAL | Age: 64
End: 2024-10-01
Payer: MEDICARE

## 2024-10-01 NOTE — TELEPHONE ENCOUNTER
Left voicemail and sent portal message for patient to call Endoscopy Scheduling to review instructions and confirm appointment for Colonoscopy on 10/8/24.

## 2024-10-07 ENCOUNTER — ANESTHESIA EVENT (OUTPATIENT)
Dept: ENDOSCOPY | Facility: HOSPITAL | Age: 64
End: 2024-10-07
Payer: MEDICARE

## 2024-10-08 ENCOUNTER — HOSPITAL ENCOUNTER (OUTPATIENT)
Facility: HOSPITAL | Age: 64
Discharge: HOME | End: 2024-10-08
Attending: INTERNAL MEDICINE | Admitting: INTERNAL MEDICINE
Payer: MEDICARE

## 2024-10-08 ENCOUNTER — ANESTHESIA (OUTPATIENT)
Dept: ENDOSCOPY | Facility: HOSPITAL | Age: 64
End: 2024-10-08
Payer: MEDICARE

## 2024-10-08 VITALS
TEMPERATURE: 98 F | DIASTOLIC BLOOD PRESSURE: 98 MMHG | RESPIRATION RATE: 18 BRPM | SYSTOLIC BLOOD PRESSURE: 174 MMHG | OXYGEN SATURATION: 98 % | HEART RATE: 84 BPM

## 2024-10-08 DIAGNOSIS — Z86.0100 HISTORY OF COLON POLYPS: ICD-10-CM

## 2024-10-08 PROCEDURE — 37000008 HC ANESTHESIA 1ST 15 MINUTES: Mod: HCNC | Performed by: INTERNAL MEDICINE

## 2024-10-08 PROCEDURE — 27200997: Mod: HCNC | Performed by: INTERNAL MEDICINE

## 2024-10-08 PROCEDURE — 37000009 HC ANESTHESIA EA ADD 15 MINS: Mod: HCNC | Performed by: INTERNAL MEDICINE

## 2024-10-08 PROCEDURE — 88305 TISSUE EXAM BY PATHOLOGIST: CPT | Mod: 26,HCNC,, | Performed by: PATHOLOGY

## 2024-10-08 PROCEDURE — 27201012 HC FORCEPS, HOT/COLD, DISP: Mod: HCNC | Performed by: INTERNAL MEDICINE

## 2024-10-08 PROCEDURE — 63600175 PHARM REV CODE 636 W HCPCS: Mod: HCNC | Performed by: STUDENT IN AN ORGANIZED HEALTH CARE EDUCATION/TRAINING PROGRAM

## 2024-10-08 PROCEDURE — 45380 COLONOSCOPY AND BIOPSY: CPT | Mod: PT,59,HCNC | Performed by: INTERNAL MEDICINE

## 2024-10-08 PROCEDURE — 45385 COLONOSCOPY W/LESION REMOVAL: CPT | Mod: PT,HCNC,, | Performed by: INTERNAL MEDICINE

## 2024-10-08 PROCEDURE — 25000003 PHARM REV CODE 250: Mod: HCNC | Performed by: ANESTHESIOLOGY

## 2024-10-08 PROCEDURE — 88305 TISSUE EXAM BY PATHOLOGIST: CPT | Mod: 59,HCNC | Performed by: PATHOLOGY

## 2024-10-08 PROCEDURE — 45380 COLONOSCOPY AND BIOPSY: CPT | Mod: PT,59,HCNC, | Performed by: INTERNAL MEDICINE

## 2024-10-08 PROCEDURE — 27201089 HC SNARE, DISP (ANY): Mod: HCNC | Performed by: INTERNAL MEDICINE

## 2024-10-08 PROCEDURE — 45385 COLONOSCOPY W/LESION REMOVAL: CPT | Mod: PT,HCNC | Performed by: INTERNAL MEDICINE

## 2024-10-08 RX ORDER — SODIUM CHLORIDE 9 MG/ML
INJECTION, SOLUTION INTRAVENOUS CONTINUOUS
Status: DISCONTINUED | OUTPATIENT
Start: 2024-10-08 | End: 2024-10-08 | Stop reason: HOSPADM

## 2024-10-08 RX ORDER — PROPOFOL 10 MG/ML
VIAL (ML) INTRAVENOUS
Status: DISCONTINUED | OUTPATIENT
Start: 2024-10-08 | End: 2024-10-08

## 2024-10-08 RX ORDER — PROPOFOL 10 MG/ML
VIAL (ML) INTRAVENOUS
Status: DISCONTINUED
Start: 2024-10-08 | End: 2024-10-08 | Stop reason: HOSPADM

## 2024-10-08 RX ORDER — LIDOCAINE HYDROCHLORIDE 20 MG/ML
INJECTION, SOLUTION EPIDURAL; INFILTRATION; INTRACAUDAL; PERINEURAL
Status: DISCONTINUED
Start: 2024-10-08 | End: 2024-10-08 | Stop reason: HOSPADM

## 2024-10-08 RX ORDER — LIDOCAINE HYDROCHLORIDE 20 MG/ML
INJECTION INTRAVENOUS
Status: DISCONTINUED | OUTPATIENT
Start: 2024-10-08 | End: 2024-10-08

## 2024-10-08 RX ADMIN — PROPOFOL 40 MG: 10 INJECTION, EMULSION INTRAVENOUS at 01:10

## 2024-10-08 RX ADMIN — PROPOFOL 20 MG: 10 INJECTION, EMULSION INTRAVENOUS at 01:10

## 2024-10-08 RX ADMIN — PROPOFOL 30 MG: 10 INJECTION, EMULSION INTRAVENOUS at 01:10

## 2024-10-08 RX ADMIN — PROPOFOL 100 MG: 10 INJECTION, EMULSION INTRAVENOUS at 01:10

## 2024-10-08 RX ADMIN — LIDOCAINE HYDROCHLORIDE 100 MG: 20 INJECTION, SOLUTION INTRAVENOUS at 01:10

## 2024-10-08 RX ADMIN — SODIUM CHLORIDE: 0.9 INJECTION, SOLUTION INTRAVENOUS at 12:10

## 2024-10-08 NOTE — TRANSFER OF CARE
Anesthesia Transfer of Care Note    Patient: Omid Rascon    Procedure(s) Performed: Procedure(s) (LRB):  COLONOSCOPY, SCREENING, HIGH RISK PATIENT (N/A)    Patient location: GI    Anesthesia Type: general    Transport from OR: Transported from OR on room air with adequate spontaneous ventilation    Post pain: adequate analgesia    Post assessment: no apparent anesthetic complications and tolerated procedure well    Post vital signs: stable    Level of consciousness: awake and alert    Nausea/Vomiting: no nausea/vomiting    Complications: none    Transfer of care protocol was followed      Last vitals: Visit Vitals  BP (!) 142/87   Pulse 86   Temp 36.4 °C (97.6 °F) (Oral)   Resp 18   SpO2 96%   Breastfeeding No

## 2024-10-08 NOTE — [employer] PATIENT INSTRUCTIONS
Discharge Summary/Instructions after an Endoscopic Procedure  Patient Name: Omid Rascon  Patient MRN: [id number]  Patient YOB: 1960 Tuesday, October 8, 2024  Amarilis Chacon MD  Dear patient,  As a result of recent federal legislation (The Federal Cures Act), you may   receive lab or pathology results from your procedure in your [facility]   account before your physician is able to contact you. Your physician or   their representative will relay the results to you with their   recommendations at their soonest availability.  Thank you,  RESTRICTIONS:  During your procedure today, you received medications for sedation.  These   medications may affect your judgment, balance and coordination.  Therefore,   for 24 hours, you have the following restrictions:   - DO NOT drive a car, operate machinery, make legal/financial decisions,   sign important papers or drink alcohol.    ACTIVITY:  Today: no heavy lifting, straining or running due to procedural   sedation/anesthesia.  The following day: return to full activity including work.  DIET:  Eat and drink normally unless instructed otherwise.     TREATMENT FOR COMMON SIDE EFFECTS:  - Mild abdominal pain, nausea, belching, bloating or excessive gas:  rest,   eat lightly and use a heating pad.  - Sore Throat: treat with throat lozenges and/or gargle with warm salt   water.  - Because air was used during the procedure, expelling large amounts of air   from your rectum or belching is normal.  - If a bowel prep was taken, you may not have a bowel movement for 1-3 days.    This is normal.  SYMPTOMS TO WATCH FOR AND REPORT TO YOUR PHYSICIAN:  1. Abdominal pain or bloating, other than gas cramps.  2. Chest pain.  3. Back pain.  4. Signs of infection such as: chills or fever occurring within 24 hours   after the procedure.  5. Rectal bleeding, which would show as bright red, maroon, or black stools.   (A tablespoon of blood from the rectum is not serious, especially  if   hemorrhoids are present.)  6. Vomiting.  7. Weakness or dizziness.  GO DIRECTLY TO THE NEAREST EMERGENCY ROOM IF YOU HAVE ANY OF THE FOLLOWING:      Difficulty breathing              Chills and/or fever over 101 F   Persistent vomiting and/or vomiting blood   Severe abdominal pain   Severe chest pain   Black, tarry stools   Bleeding- more than one tablespoon   Any other symptom or condition that you feel may need urgent attention  Your doctor recommends these additional instructions:  If any biopsies were taken, your doctors clinic will contact you in 1 to 2   weeks with any results.  - Discharge patient to home.   - Resume previous diet.   - Continue present medications.   - Await pathology results.   - Repeat colonoscopy in 3 years for surveillance.  For questions, problems or results please call your physician - Amarilis Chacon MD at Work:  [phone].  [facility] can be reached at [phone]     IF A COMPLICATION OR EMERGENCY SITUATION ARISES AND YOU ARE UNABLE TO REACH   YOUR PHYSICIAN - GO DIRECTLY TO THE EMERGENCY ROOM.  Amarilis Chacon MD  10/8/2024 2:05:36 PM  This report has been verified and signed electronically.  Dear patient,  As a result of recent federal legislation (The Federal Cures Act), you may   receive lab or pathology results from your procedure in your [facility]   account before your physician is able to contact you. Your physician or   their representative will relay the results to you with their   recommendations at their soonest availability.  Thank you,  [employer]

## 2024-10-08 NOTE — ANESTHESIA PREPROCEDURE EVALUATION
10/08/2024  Omid Rascon is a 64 y.o., female.  To undergo Procedure(s) (LRB):  COLONOSCOPY, SCREENING, HIGH RISK PATIENT (N/A)     Denies CP/SOB/GERD/MI/CVA/URI symptoms.  METS > 4  NPO > 8    Past Medical History:  Past Medical History:   Diagnosis Date    Anemia     CKD (chronic kidney disease), stage II 5/16/2016    Depression     Hemoglobin C trait     Hypertension     no meds    Impaired hearing     Lumbago 8/10/2015    Monoclonal gammopathy 11/10/2016    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       Past Surgical History:  Past Surgical History:   Procedure Laterality Date    ABLATION  2008    COLONOSCOPY N/A 09/14/2022    Procedure: COLONOSCOPY;  Surgeon: Amarilis Chacon MD;  Location: [facility];  Service: Endoscopy;  Laterality: N/A;  positive FIT.Fully vaccinated EC    HYSTERECTOMY  3/2008    Partial Hysterectomy, ablation of uterus    LASER ABLATION OF THE CERVIX         Social History:  Social History     Socioeconomic History    Marital status: Other   Tobacco Use    Smoking status: Never     Passive exposure: Never    Smokeless tobacco: Never    Tobacco comments:     Pt dont smoke   Substance and Sexual Activity    Alcohol use: No    Drug use: No    Sexual activity: Not Currently     Partners: Male     Birth control/protection: Abstinence, See Surgical Hx, None     Social Drivers of Health     Financial Resource Strain: Low Risk  (1/28/2024)    Overall Financial Resource Strain (CARDIA)     Difficulty of Paying Living Expenses: Not hard at all   Food Insecurity: No Food Insecurity (1/28/2024)    Hunger Vital Sign     Worried About Running Out of Food in the Last Year: Never true     Ran Out of Food in the Last Year: Never true   Transportation Needs: No Transportation Needs (1/28/2024)    PRAPARE - Transportation     Lack of Transportation (Medical): No     Lack of Transportation (Non-Medical): No   Physical Activity: Sufficiently Active (1/28/2024)    Exercise  Vital Sign     Days of Exercise per Week: 7 days     Minutes of Exercise per Session: 60 min   Stress: No Stress Concern Present (1/28/2024)    [employer] - Occupational Stress Questionnaire     Feeling of Stress : Not at all   Housing Stability: Low Risk  (1/28/2024)    Housing Stability Vital Sign     Unable to Pay for Housing in the Last Year: No     Number of Places Lived in the Last Year: 1     Unstable Housing in the Last Year: No       Medications:  No current facility-administered medications on file prior to encounter.     Current Outpatient Medications on File Prior to Encounter   Medication Sig Dispense Refill    alendronate (FOSAMAX) 70 MG tablet Take 1 tablet (70 mg total) by mouth every 7 days. 12 tablet 3    cetirizine (ZYRTEC) 10 MG tablet Take 1 tablet (10 mg total) by mouth daily as needed for Allergies or Rhinitis. 90 tablet 3    ergocalciferol, vitamin D2, (VITAMIN D ORAL) Take by mouth.      losartan (COZAAR) 50 MG tablet Take 1 tablet (50 mg total) by mouth once daily. 90 tablet 3       Allergies:  Review of patient's allergies indicates:   Allergen Reactions    Lactose     Sulfa (sulfonamide antibiotics) Swelling    Latex, natural rubber Rash    Shellfish containing products Rash    Strawberries [strawberry] Rash       Active Problems:  Patient Active Problem List   Diagnosis    White coat syndrome with diagnosis of hypertension    Hemoglobin C trait    Weakness of trunk musculature    Decreased functional mobility and endurance    Poor motor control of trunk    Frontal skull lesion    Tortuous aorta    Renal cyst    Monoclonal gammopathy    Chronic bilateral low back pain without sciatica    Bilateral hearing loss    Presence of hemoglobin delta chain variant    Multinodular thyroid    Bilateral leg weakness    Decreased strength, endurance, and mobility    Recurrent major depressive disorder, in partial remission    Refractive error    Nuclear sclerosis of both  eyes    Vitamin D deficiency    Chronic kidney disease, stage 3a    Secondary hyperparathyroidism    Prediabetes    Research subject    Age-related osteoporosis without current pathological fracture       Diagnostic Studies:   Latest Reference Range & Units 04/23/24 09:10   WBC 3.90 - 12.70 K/uL 4.36   RBC 4.00 - 5.40 M/uL 4.59   Hemoglobin 12.0 - 16.0 g/dL 12.3   Hematocrit 37.0 - 48.5 % 36.8 (L)   MCV 82 - 98 fL 80 (L)   MCH 27.0 - 31.0 pg 26.8 (L)   MCHC 32.0 - 36.0 g/dL 33.4   RDW 11.5 - 14.5 % 14.6 (H)   Platelet Count 150 - 450 K/uL 198   MPV 9.2 - 12.9 fL 11.1   Gran % 38.0 - 73.0 % 48.3   Lymph % 18.0 - 48.0 % 38.5   Mono % 4.0 - 15.0 % 10.3   Eos % 0.0 - 8.0 % 1.8   Basophil % 0.0 - 1.9 % 1.1   Immature Granulocytes 0.0 - 0.5 % 0.0   Gran # (ANC) 1.8 - 7.7 K/uL 2.1   Lymph # 1.0 - 4.8 K/uL 1.7   Mono # 0.3 - 1.0 K/uL 0.5   Eos # 0.0 - 0.5 K/uL 0.1   Baso # 0.00 - 0.20 K/uL 0.05   Immature Grans (Abs) 0.00 - 0.04 K/uL 0.00   nRBC 0 /100 WBC 0   Differential Method  Automated      Latest Reference Range & Units 04/23/24 09:10   Sodium 136 - 145 mmol/L 139   Potassium 3.5 - 5.1 mmol/L 3.6   Chloride 95 - 110 mmol/L 104   CO2 23 - 29 mmol/L 27   Anion Gap 8 - 16 mmol/L 8   BUN 8 - 23 mg/dL 18   Creatinine 0.5 - 1.4 mg/dL 1.3   eGFR >60 mL/min/1.73 m^2 45.9 !     TTE (7/8/22):  The left ventricle is normal in size with eccentric hypertrophy and normal systolic function.  The estimated ejection fraction is 60%.  Normal left ventricular diastolic function.  Normal right ventricular size with normal right ventricular systolic function.  Moderate left atrial enlargement.  Normal central venous pressure (3 mmHg).  The estimated PA systolic pressure is 25 mmHg.  There is no pulmonary hypertension    24 Hour Vitals:      See Nursing Charting For Additional Vitals      Pre-op Assessment    I have reviewed the Patient Summary Reports.     I have reviewed the Nursing Notes.       Review of Systems  Anesthesia Hx:  No  problems with previous Anesthesia               Denies Personal Hx of Anesthesia complications.                    Social:  Non-Smoker, No Alcohol Use       Hematology/Oncology:                   Hematology Comments: Monoclonal gammopathy                    Cardiovascular:  Exercise tolerance: good   Hypertension                                        Pulmonary:  Pulmonary Normal                       Renal/:  Chronic Renal Disease, CKD                Hepatic/GI:  Hepatic/GI Normal                 Musculoskeletal:         Spine Disorders:             Neurological:  Neurology Normal                                      Endocrine:        Obesity / BMI > 30  Psych:    depression                Physical Exam  General: Well nourished and Cooperative    Airway:  Mallampati: II   Mouth Opening: Normal  TM Distance: Normal    Dental:  Partial Dentures    Chest/Lungs:  Clear to auscultation, Normal Respiratory Rate    Heart:  Rate: Normal  Rhythm: Regular Rhythm        Anesthesia Plan  Type of Anesthesia, risks & benefits discussed:    Anesthesia Type: Gen ETT, Gen Natural Airway, MAC  Intra-op Monitoring Plan: Standard ASA Monitors  Post Op Pain Control Plan: multimodal analgesia  Induction:  IV  Informed Consent: Informed consent signed with the Patient and all parties understand the risks and agree with anesthesia plan.  All questions answered.   ASA Score: 3    Ready For Surgery From Anesthesia Perspective.     .

## 2024-10-08 NOTE — H&P
Short Stay Endoscopy History and Physical    PCP - Ti Hendrickson MD    Procedure - Colonoscopy  ASA - per anesthesia  Mallampati - per anesthesia  History of Anesthesia problems - no  Family history Anesthesia problems - no   Plan of anesthesia - General    HPI:  This is a 64 y.o. female here for evaluation of : personal history of colon polyps      ROS:  Constitutional: No fevers, chills, No weight loss  CV: No chest pain  Pulm: No cough, No shortness of breath  GI: see HPI  Derm: No rash    Medical History:  has a past medical history of Anemia, CKD (chronic kidney disease), stage II (5/16/2016), Depression, Hemoglobin C trait, Hypertension, Impaired hearing, Lumbago (8/10/2015), Monoclonal gammopathy (11/10/2016), Nuclear sclerosis of both eyes (10/7/2019), and Tortuous aorta (3/18/2016).    Surgical History:  has a past surgical history that includes Laser ablation of the cervix; Ablation (2008); Colonoscopy (N/A, 09/14/2022); and Hysterectomy (3/2008).    Family History: family history includes Diabetes in her sister; Heart failure in her mother; No Known Problems in her brother, brother, brother, brother, maternal aunt, maternal grandfather, maternal grandmother, maternal uncle, paternal aunt, paternal grandfather, paternal grandmother, paternal uncle, sister, sister, sister, sister, and sister; Stroke in her father.. Otherwise no colon cancer, inflammatory bowel disease, or GI malignancies.    Social History:  reports that she has never smoked. She has never been exposed to tobacco smoke. She has never used smokeless tobacco. She reports that she does not drink alcohol and does not use drugs.    Review of patient's allergies indicates:   Allergen Reactions    Lactose     Sulfa (sulfonamide antibiotics) Swelling    Latex, natural rubber Rash    Shellfish containing products Rash    Strawberries [strawberry] Rash       Medications:   Medications Prior to Admission   Medication Sig Dispense Refill Last  Dose/Taking    alendronate (FOSAMAX) 70 MG tablet Take 1 tablet (70 mg total) by mouth every 7 days. 12 tablet 3     cetirizine (ZYRTEC) 10 MG tablet Take 1 tablet (10 mg total) by mouth daily as needed for Allergies or Rhinitis. 90 tablet 3     ergocalciferol, vitamin D2, (VITAMIN D ORAL) Take by mouth.       losartan (COZAAR) 50 MG tablet Take 1 tablet (50 mg total) by mouth once daily. 90 tablet 3          Physical Exam:    Vital Signs:   Vitals:    10/08/24 1233   BP: (!) 189/120   Pulse: 90   Resp: 18   Temp: 98.5 °F (36.9 °C)       Gen: NAD, lying comfortably  HENT: NCAT, oropharynx clear  Eyes: anicteric sclerae, EOMI grossly  Neck: supple, no visible masses/goiter  Cardiac: RRR  Lungs: non-labored breathing  Abd: soft, NT/ND, normoactive BS  Ext: no LE edema, warm, well perfused  Skin: skin intact on exposed body parts, no visible rashes, lesions  Neuro: A&Ox4, neuro exam grossly intact, moves all extremities  Psych: appropriate mood, affect        Labs:  Lab Results   Component Value Date    WBC 4.36 04/23/2024    HGB 12.3 04/23/2024    HCT 36.8 (L) 04/23/2024     04/23/2024    CHOL 193 04/23/2024    TRIG 77 04/23/2024    HDL 63 04/23/2024    ALT 18 04/23/2024    AST 20 04/23/2024     04/23/2024    K 3.6 04/23/2024     04/23/2024    CREATININE 1.3 04/23/2024    BUN 18 04/23/2024    CO2 27 04/23/2024    TSH 0.719 04/20/2023    INR 1.1 06/24/2022    HGBA1C 5.8 (H) 04/23/2024       Plan:  Colonoscopy for a history of colon polyps.    I have explained the risks and benefits of endoscopy procedures to the patient including but not limited to bleeding, perforation, infection, and death.  The patient was asked if they understand and allowed to ask any further questions to their satisfaction.    Amarilis Chacon MD

## 2024-10-08 NOTE — ANESTHESIA POSTPROCEDURE EVALUATION
Anesthesia Post Evaluation    Patient: Omid Rascon    Procedure(s) Performed: Procedure(s) (LRB):  COLONOSCOPY, SCREENING, HIGH RISK PATIENT (N/A)    Final Anesthesia Type: general      Patient location during evaluation: GI PACU  Patient participation: Yes- Able to Participate  Level of consciousness: awake and alert and oriented  Post-procedure vital signs: reviewed and stable  Pain management: adequate  Airway patency: patent    PONV status at discharge: No PONV  Anesthetic complications: no      Cardiovascular status: hemodynamically stable and blood pressure returned to baseline  Respiratory status: spontaneous ventilation, room air and unassisted  Hydration status: euvolemic  Follow-up not needed.              Vitals Value Taken Time   /87 10/08/24 1347   Temp 36.4 °C (97.6 °F) 10/08/24 1347   Pulse 86 10/08/24 1347   Resp 18 10/08/24 1347   SpO2 96 % 10/08/24 1347         No case tracking events are documented in the log.      Pain/Asad Score: No data recorded

## 2024-10-08 NOTE — OR NURSING
PROCEDURE AND RECOVERY COMPLETED. DR BLACK DISCUSSED FINDINGS AND DISCHARGE INSTRUCTIONS GIVEN ; DR SUAZO DISCUSSED FURTHER INTERVENTIONS FOR B/P; INSTRUCTED PATIENT TO FOLLOW UP WITH PCP FOR B/P MANAGEMENT; PATIENT DENIES SIGNS AND SYMPTOMS OF HEADACHE, BLURRED VISION, N/V OR DIZZINESS; INSTRUCTED TO RETURNED TO ED IF DEVELOP ANY SIGNS AND SYMPTOMS OF STROKE. SON AT BEDSIDE REVIEW DISCHARGE INSTRUCTION AND PLAN OF CARE WITH HIM UNDERSTANDING VERBALIZED;  PATIENT READY FOR DISCHARGE.

## 2024-10-09 LAB
FINAL PATHOLOGIC DIAGNOSIS: NORMAL
GROSS: NORMAL
Lab: NORMAL

## 2024-10-12 ENCOUNTER — PATIENT MESSAGE (OUTPATIENT)
Dept: ADMINISTRATIVE | Facility: OTHER | Age: 64
End: 2024-10-12
Payer: MEDICARE

## 2024-12-16 ENCOUNTER — PATIENT MESSAGE (OUTPATIENT)
Dept: ADMINISTRATIVE | Facility: OTHER | Age: 64
End: 2024-12-16
Payer: MEDICARE

## 2025-02-25 ENCOUNTER — OFFICE VISIT (OUTPATIENT)
Dept: OBSTETRICS AND GYNECOLOGY | Facility: CLINIC | Age: 65
End: 2025-02-25
Payer: MEDICARE

## 2025-02-25 VITALS
SYSTOLIC BLOOD PRESSURE: 152 MMHG | DIASTOLIC BLOOD PRESSURE: 90 MMHG | WEIGHT: 167 LBS | HEIGHT: 64 IN | BODY MASS INDEX: 28.51 KG/M2

## 2025-02-25 DIAGNOSIS — N81.89 OTHER FEMALE GENITAL PROLAPSE: Primary | ICD-10-CM

## 2025-02-25 PROCEDURE — 3288F FALL RISK ASSESSMENT DOCD: CPT | Mod: HCNC,CPTII,S$GLB, | Performed by: STUDENT IN AN ORGANIZED HEALTH CARE EDUCATION/TRAINING PROGRAM

## 2025-02-25 PROCEDURE — 99999 PR PBB SHADOW E&M-EST. PATIENT-LVL IV: CPT | Mod: PBBFAC,HCNC,, | Performed by: STUDENT IN AN ORGANIZED HEALTH CARE EDUCATION/TRAINING PROGRAM

## 2025-02-25 PROCEDURE — 3077F SYST BP >= 140 MM HG: CPT | Mod: HCNC,CPTII,S$GLB, | Performed by: STUDENT IN AN ORGANIZED HEALTH CARE EDUCATION/TRAINING PROGRAM

## 2025-02-25 PROCEDURE — 3080F DIAST BP >= 90 MM HG: CPT | Mod: HCNC,CPTII,S$GLB, | Performed by: STUDENT IN AN ORGANIZED HEALTH CARE EDUCATION/TRAINING PROGRAM

## 2025-02-25 PROCEDURE — 1101F PT FALLS ASSESS-DOCD LE1/YR: CPT | Mod: HCNC,CPTII,S$GLB, | Performed by: STUDENT IN AN ORGANIZED HEALTH CARE EDUCATION/TRAINING PROGRAM

## 2025-02-25 PROCEDURE — 1160F RVW MEDS BY RX/DR IN RCRD: CPT | Mod: HCNC,CPTII,S$GLB, | Performed by: STUDENT IN AN ORGANIZED HEALTH CARE EDUCATION/TRAINING PROGRAM

## 2025-02-25 PROCEDURE — 1159F MED LIST DOCD IN RCRD: CPT | Mod: HCNC,CPTII,S$GLB, | Performed by: STUDENT IN AN ORGANIZED HEALTH CARE EDUCATION/TRAINING PROGRAM

## 2025-02-25 PROCEDURE — 3008F BODY MASS INDEX DOCD: CPT | Mod: HCNC,CPTII,S$GLB, | Performed by: STUDENT IN AN ORGANIZED HEALTH CARE EDUCATION/TRAINING PROGRAM

## 2025-02-25 PROCEDURE — 99213 OFFICE O/P EST LOW 20 MIN: CPT | Mod: HCNC,S$GLB,, | Performed by: STUDENT IN AN ORGANIZED HEALTH CARE EDUCATION/TRAINING PROGRAM

## 2025-02-25 PROCEDURE — 1125F AMNT PAIN NOTED PAIN PRSNT: CPT | Mod: HCNC,CPTII,S$GLB, | Performed by: STUDENT IN AN ORGANIZED HEALTH CARE EDUCATION/TRAINING PROGRAM

## 2025-02-25 NOTE — PROGRESS NOTES
Subjective     Patient ID: Omid Rascon is a 64 y.o. female.    Chief Complaint:  Gynecologic Exam (Vaginal Prolaspe)      History of Present Illness  63 yo presents for evaluation of prolapse    This is not a new problem, she states she was supposed to get surgery for it within the last few years but the day before surgery there was an issue and her case was cancelled, never contacted to reschedule  [place] and prefers to have doctors here  Says she has to urinate every 30 minutes, has not had intercourse with her  in years because of this problem and it is very embarrassing for her      GYN & OB History  No LMP recorded (lmp unknown). Patient has had an ablation.   Date of Last Pap: 2023    OB History    Para Term  AB Living   4 2 2  2    SAB IAB Ectopic Multiple Live Births   2          # Outcome Date GA Lbr Kee/2nd Weight Sex Type Anes PTL Lv   4 Term            3 Term            2 SAB            1 SAB                Review of Systems  Review of Systems   Genitourinary:  Positive for pelvic pain and urinary incontinence.   All other systems reviewed and are negative.         Objective   Physical Exam:   Constitutional: She appears well-developed and well-nourished.    HENT:   Head: Normocephalic and atraumatic.    Eyes: EOM are normal.      Pulmonary/Chest: Effort normal.        Abdominal: Soft.     Genitourinary:    Uterus normal.      Pelvic exam was performed with patient in the lithotomy position.   The external female genitalia was normal.   Genitalia hair distrobution normal .   There is no lesion on the right labia. There is no lesion on the left labia. There is unspecified prolapse of vaginal walls in the vagina.    Genitourinary Comments: Female chaperone present for duration of exam             Musculoskeletal: Normal range of motion.       Neurological: She is alert.    Skin: Skin is warm and dry.    Psychiatric: She has a normal mood and affect.            Assessment and Plan     1. Other female genital prolapse        Plan:  1. Other female genital prolapse (Primary)  - discussed management with pessary and recommendation for surgery with urogynecology.  - easily reduced without discomfort on exam, however these symptoms are contributing to urinary incontinence and inability to work/travel/have intercourse with her   - no pessary kits for fitting in office, will expedite referral to specialist    - Ambulatory referral/consult to Urogynecology; Future  - Ambulatory Referral/Consult to Physical Therapy/Occupational Therapy; Future      Follow up after urogyn evaluation     Chuy Mishra III, MD  [facility] - OB GYN   [address]  [place] [id number] [phone]

## 2025-02-27 ENCOUNTER — OFFICE VISIT (OUTPATIENT)
Dept: UROGYNECOLOGY | Facility: CLINIC | Age: 65
End: 2025-02-27
Payer: MEDICARE

## 2025-02-27 VITALS
WEIGHT: 167.13 LBS | BODY MASS INDEX: 28.53 KG/M2 | DIASTOLIC BLOOD PRESSURE: 100 MMHG | HEIGHT: 64 IN | SYSTOLIC BLOOD PRESSURE: 180 MMHG

## 2025-02-27 DIAGNOSIS — N81.10 PELVIC ORGAN PROLAPSE QUANTIFICATION STAGE 3 CYSTOCELE: ICD-10-CM

## 2025-02-27 DIAGNOSIS — R39.15 URINARY URGENCY: ICD-10-CM

## 2025-02-27 DIAGNOSIS — Z46.89 ENCOUNTER FOR FITTING AND ADJUSTMENT OF PESSARY: Primary | ICD-10-CM

## 2025-02-27 DIAGNOSIS — N95.8 GENITOURINARY SYNDROME OF MENOPAUSE: ICD-10-CM

## 2025-02-27 LAB
BILIRUB SERPL-MCNC: ABNORMAL MG/DL
BLOOD URINE, POC: ABNORMAL
CLARITY, POC UA: CLEAR
COLOR, POC UA: YELLOW
GLUCOSE UR QL STRIP: ABNORMAL
KETONES UR QL STRIP: ABNORMAL
LEUKOCYTE ESTERASE URINE, POC: ABNORMAL
MICROSCOPIC COMMENT: NORMAL
NITRITE, POC UA: ABNORMAL
PH, POC UA: 5
PROTEIN, POC: ABNORMAL
RBC #/AREA URNS AUTO: 2 /HPF (ref 0–4)
SPECIFIC GRAVITY, POC UA: 1.02
SQUAMOUS #/AREA URNS AUTO: 0 /HPF
UROBILINOGEN, POC UA: ABNORMAL
WBC #/AREA URNS AUTO: 0 /HPF (ref 0–5)

## 2025-02-27 PROCEDURE — 3080F DIAST BP >= 90 MM HG: CPT | Mod: HCNC,CPTII,S$GLB, | Performed by: NURSE PRACTITIONER

## 2025-02-27 PROCEDURE — 99215 OFFICE O/P EST HI 40 MIN: CPT | Mod: HCNC,S$GLB,, | Performed by: NURSE PRACTITIONER

## 2025-02-27 PROCEDURE — 81001 URINALYSIS AUTO W/SCOPE: CPT | Mod: HCNC | Performed by: NURSE PRACTITIONER

## 2025-02-27 PROCEDURE — 1159F MED LIST DOCD IN RCRD: CPT | Mod: HCNC,CPTII,S$GLB, | Performed by: NURSE PRACTITIONER

## 2025-02-27 PROCEDURE — 3288F FALL RISK ASSESSMENT DOCD: CPT | Mod: HCNC,CPTII,S$GLB, | Performed by: NURSE PRACTITIONER

## 2025-02-27 PROCEDURE — 3077F SYST BP >= 140 MM HG: CPT | Mod: HCNC,CPTII,S$GLB, | Performed by: NURSE PRACTITIONER

## 2025-02-27 PROCEDURE — 3008F BODY MASS INDEX DOCD: CPT | Mod: HCNC,CPTII,S$GLB, | Performed by: NURSE PRACTITIONER

## 2025-02-27 PROCEDURE — 1101F PT FALLS ASSESS-DOCD LE1/YR: CPT | Mod: HCNC,CPTII,S$GLB, | Performed by: NURSE PRACTITIONER

## 2025-02-27 PROCEDURE — 99999 PR PBB SHADOW E&M-EST. PATIENT-LVL III: CPT | Mod: PBBFAC,HCNC,, | Performed by: NURSE PRACTITIONER

## 2025-02-27 PROCEDURE — 81002 URINALYSIS NONAUTO W/O SCOPE: CPT | Mod: HCNC,S$GLB,, | Performed by: NURSE PRACTITIONER

## 2025-02-27 PROCEDURE — 87086 URINE CULTURE/COLONY COUNT: CPT | Mod: HCNC | Performed by: NURSE PRACTITIONER

## 2025-02-27 RX ORDER — ESTRADIOL 0.1 MG/G
CREAM VAGINAL
Qty: 42.5 G | Refills: 3 | Status: SHIPPED | OUTPATIENT
Start: 2025-02-27

## 2025-02-27 NOTE — PROGRESS NOTES
[facility] - UROGYNECOLOGY  [address] [address]    Omid Rascon  [id number]  1960 February 27, 2025    Consulting Physician: Chuy Mishra III, MD   GYN: Chuy Mishra III, MD   Primary M.D.: Ti Hendrickson MD    Chief Complaint   Patient presents with    pessary fitting     HPI:     1)  UI:  (+) KYLE  no (+) UUI  X 3years.  (+) pads.  Daytime frequency: Q 20-30 minutes. Nocturia: Yes.(--) dysuria,  (--) hematuria,  (--) frequent UTIs.  (--) complete bladder emptying.     2)  POP:  Present. below introitus.  Symptoms:(+).  (--) vaginal bleeding. (--) vaginal discharge. (+) sexually active.  (+)  Vaginal dryness.  (--) vaginal estrogen use.     3)  BM:  (--) constipation/straining.  (--) chronic diarrhea. (--) hematochezia.  (--) fecal incontinence.  (--) fecal smearing/urgency.  (--) incomplete evacuation.      Past Medical History  Past Medical History:   Diagnosis Date    Anemia     CKD (chronic kidney disease), stage II 5/16/2016    Depression     Hemoglobin C trait     Hypertension     no meds    Impaired hearing     Lumbago 8/10/2015    Monoclonal gammopathy 11/10/2016    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        Past Surgical History  Past Surgical History:   Procedure Laterality Date    ABLATION  2008    COLONOSCOPY N/A 09/14/2022    Procedure: COLONOSCOPY;  Surgeon: Amarilis Chacon MD;  Location: [facility];  Service: Endoscopy;  Laterality: N/A;  positive FIT.Fully vaccinated EC    COLONOSCOPY, SCREENING, HIGH RISK PATIENT N/A 10/8/2024    Procedure: COLONOSCOPY, SCREENING, HIGH RISK PATIENT;  Surgeon: Amarilis Chcaon MD;  Location: [facility] ENDO;  Service: Endoscopy;  Laterality: N/A;  previous procedure done by Dr. Chacon  polyps noted on procedure report 9/14/2022 9/9 ref by Jaqueline Christensen, MELECIO, PEG, instr. to portal-st  10/1/24- lvm/portal for pc-- pt returned call, pc complete. DBM    HYSTERECTOMY  3/2008    Partial  Hysterectomy, ablation of uterus    LASER ABLATION OF THE CERVIX          Hysterectomy: NO    OB History          4    Para   2    Term   2            AB   2    Living             SAB   2    IAB        Ectopic        Multiple        Live Births                   Gynecologic History  LMP: No LMP recorded (lmp unknown). Patient has had an ablation.  Last pap: NILM, HPV negative (2023)  Last mammogram: BIRADS 1, Negative (2024)  Colonoscopy: Benign polyps (10/2024), Repeat in 3 years   DEXA: Osteoporosis (2024)    Family History  Family History   Problem Relation Name Age of Onset    Stroke Father Jimpy     Heart failure Mother      No Known Problems Sister      No Known Problems Brother      No Known Problems Brother      No Known Problems Brother      No Known Problems Sister      No Known Problems Sister      No Known Problems Sister      No Known Problems Brother      No Known Problems Sister      Diabetes Sister Oldest     No Known Problems Maternal Aunt      No Known Problems Maternal Uncle      No Known Problems Paternal Aunt      No Known Problems Paternal Uncle      No Known Problems Maternal Grandmother      No Known Problems Maternal Grandfather      No Known Problems Paternal Grandmother      No Known Problems Paternal Grandfather      Amblyopia Neg Hx      Blindness Neg Hx      Cancer Neg Hx      Cataracts Neg Hx      Glaucoma Neg Hx      Hypertension Neg Hx      Macular degeneration Neg Hx      Retinal detachment Neg Hx      Strabismus Neg Hx      Thyroid disease Neg Hx      Breast cancer Neg Hx      Colon cancer Neg Hx      Ovarian cancer Neg Hx        Social History  Tobacco Use History[1].    Social History     Substance and Sexual Activity   Alcohol Use No   .    Social History     Substance and Sexual Activity   Drug Use No   .  The patient is .  Resides in [place] [address].  Employment status: --  Allergies  Review of patient's allergies indicates:   Allergen Reactions  "   Lactose     Sulfa (sulfonamide antibiotics) Swelling    Latex, natural rubber Rash    Shellfish containing products Rash    Strawberries [strawberry] Rash       Medications  Medications Ordered Prior to Encounter[2]    Review of Systems   See HPI    Urogynecologic Exam  BP (!) 180/100   Ht 5' 4" (1.626 m)   Wt 75.8 kg (167 lb 1.7 oz)   LMP  (LMP Unknown)   BMI 28.68 kg/m²     GENERAL APPEARANCE:  The patient is a well-developed, well-nourished  female.    PELVIC:    External genitalia:  Normal Bartholins, Skenes and labia bilaterally.    Urethra:  No caruncle, diverticulum or masses.    Vagina:  Atrophy (+) , no bladder masses or tender, no discharge.    Cervix:  normal appearance  Uterus: not examined  Adnexa: not examined    POP-Q:  Aa +2; Ba +2; C -3; Ap 0; Bp 0; D -4.  Genital hiatus 4, perineal body 2 total vaginal length 8.    COUGH STRESS TEST:  negative  KEGEL: --    RECTAL:    Deferred    PVR: 150 mL w/ pessary in placed. Unable to pass catheter past urethra w/o pessary in place    The patient was fit with #5 ring w/ support pessary.  She was able to tolerate the device comfortabley with bending, squatting, valsalva.  She was not able to demonstrate independent removal and placement.  She tolerated the procedure well.        Impression    1. Encounter for fitting and adjustment of pessary    2. Pelvic organ prolapse quantification stage 3 cystocele    3. Urinary urgency    4. Genitourinary syndrome of menopause      1) Stage 3 pelvic organ prolapse, leading edge anterior wall   --Fit w/ #5 ring w/ support pessary - ordered from [facility]    2)  Urinary urgency//Mixed urinary incontinence, urge > = < stress:    --Stop fluids 2 hours prior to bedtime   --Empty bladder every 2-3 hours even if you don't have the urge.  Empty well: wait a minute, lean forward on toilet.    --Try not to go more frequently than once an hour. When you get the urge to urinate after you have just gone, distract yourself until " the urge passes.   --Avoid dietary irritants (see below).    --KEGELS: do 10 in AM and 10 in PM, holding each x 10 seconds.  When you feel urge to go, STOP, KEGEL, and when urge has passed, then go to bathroom.    --Keep appointment for Pelvic Floor Physical Therapy (PT).   --URGE LEAKAGE: consider anticholinergic.   Takes 2-4 weeks to see if will have effect.  For dry mouth: use Biotene mouthwash, get sour, sugar free lozenge or gum.  May increase constipation.   --STRESS LEAKAGE (leak with laugh/cough/sneeze):  Pessary vs. Mid-urethral sling surgery vs Impressa.   -- PVR (post void residual): 150 mL w/ pessary in place   --A1c 5.8 (04/2024)    3) GSM  --Use 0.5-1 grams of estrogen cream in vagina with applicator or dime-sized amount with finger (as far as can reach internally) nightly x 2 weeks, then twice a week thereafter.  You can also apply a dime-sized amount with your finger around the vaginal opening and inner lips at same frequency.     RTC in 2 weeks for pessary placement     Face to Face time with patient: 45    55 minutes of total time spent on the encounter, which includes face to face time and non-face to face time preparing to see the patient (eg, review of tests), Obtaining and/or reviewing separately obtained history, Documenting clinical information in the electronic or other health record, Independently interpreting results (not separately reported) and communicating results to the patient/family/caregiver, or Care coordination (not separately reported).     Isabel Tello  Female Pelvic Medicine and Reconstructive Surgery  [facility] [place], [place]         [1]   Social History  Tobacco Use   Smoking Status Never    Passive exposure: Never   Smokeless Tobacco Never   Tobacco Comments    Pt dont smoke   [2]   Current Outpatient Medications on File Prior to Visit   Medication Sig Dispense Refill    ergocalciferol, vitamin D2, (VITAMIN D ORAL) Take by mouth.      cetirizine (ZYRTEC) 10  MG tablet Take 1 tablet (10 mg total) by mouth daily as needed for Allergies or Rhinitis. 90 tablet 3     No current facility-administered medications on file prior to visit.

## 2025-02-27 NOTE — Clinical Note
Will you please call pt to schedule her for  1) 2 week f/u with me for pessary placement 2) surgical consult with physician   Isabel Jauregui

## 2025-02-27 NOTE — PATIENT INSTRUCTIONS
1) Prolapse   --Ring with support pessary #5     2) GSM   Use 0.5-1 grams of estrogen cream in vagina with applicator or dime-sized amount with finger (as far as can reach internally) nightly x 2 weeks, then twice a week thereafter.  You can also apply a dime-sized amount with your finger around the vaginal opening and inner lips at same frequency.     3)  Mixed urinary incontinence, urge > = < stress:    --Stop fluids 2 hours prior to bedtime   --Empty bladder every 2-3 hours even if you don't have the urge.  Empty well: wait a minute, lean forward on toilet.    --Try not to go more frequently than once an hour. When you get the urge to urinate after you have just gone, distract yourself until the urge passes.   --Avoid dietary irritants (see below).    --KEGELS: do 10 in AM and 10 in PM, holding each x 10 seconds.  When you feel urge to go, STOP, KEGEL, and when urge has passed, then go to bathroom.    --Consider Pelvic Floor Physical Therapy (PT) in future.    --URGE LEAKAGE: consider anticholinergic.   Takes 2-4 weeks to see if will have effect.  For dry mouth: use Biotene mouthwash, get sour, sugar free lozenge or gum.  May increase constipation.   --STRESS LEAKAGE (leak with laugh/cough/sneeze):  Pessary vs. Mid-urethral sling surgery vs Impressa.   -- PVR (post void residual): 150 mL     Bladder Irritants  Certain foods and drinks have been associated with worsening symptoms of urinary frequency, urgency, urge incontinence, or bladder pain. If you suffer from any of these conditions, you may wish to try eliminating one or more of these foods from your diet and see if your symptoms improve. If bladder symptoms are related to dietary factors, strict adherence to a diet thateliminates the food should bring marked relief in 10 days. Once you are feeling better, you can begin to add foods back into your diet, one at a time. If symptoms return, you will be able to identify the irritant. As you add foods back to  your diet it is very important that you drink significant amounts of water.     -----------------------------------------------------------------------------------------------  List of Common Bladder Irritants*  Alcoholic beverages  Apples and apple juice  Cantaloupe  Carbonated beverages  Chili and spicy foods  Chocolate  Citrus fruit  Coffee (including decaffeinated)  Cranberries and cranberry juice  Grapes  Guava  Milk Products: milk, cheese, cottage cheese, yogurt, ice cream  Peaches  Pineapple  Plums  Strawberries  Sugar especially artificial sweeteners, saccharin, aspartame, corn sweeteners, honey, fructose, sucrose, lactose  Tea  Tomatoes and tomato juice  Vitamin B complex  Vinegar  *Most people are not sensitive to ALL of these products; your goal is to find the foods that make YOUR symptoms worse.  ---------------------------------------------------------------------------------------------------     Low-acid fruit substitutions include apricots, papaya, pears and watermelon. Coffee drinkers can drink Kava or other lowacid instant drinks. Tea drinkers can substitute non-citrus herbal and sun brewed teas. Calcium carbonate co-buffered with calcium ascorbate can be substituted for Vitamin C. Prelief is a dietary supplement that works as an acid blocker for the bladder.

## 2025-02-28 LAB — BACTERIA UR CULT: NO GROWTH

## 2025-03-03 ENCOUNTER — RESULTS FOLLOW-UP (OUTPATIENT)
Dept: OBSTETRICS AND GYNECOLOGY | Facility: CLINIC | Age: 65
End: 2025-03-03

## 2025-04-02 NOTE — PROGRESS NOTES
[facility] - UROGYNECOLOGY  [address] [id number]    Omid Rascon  [id number]  1960 April 7, 2025    Consulting Physician: No ref. provider found   GYN: Chuy Mishra III, MD   Primary M.D.: Ti Hendrickson MD    Chief Complaint   Patient presents with    Pessary placement     02/27/2025  HPI:     1)  UI:  (+) KYLE  no (+) UUI  X 3years.  (+) pads.  Daytime frequency: Q 20-30 minutes. Nocturia: Yes.(--) dysuria,  (--) hematuria,  (--) frequent UTIs.  (--) complete bladder emptying.     2)  POP:  Present. below introitus.  Symptoms:(+).  (--) vaginal bleeding. (--) vaginal discharge. (+) sexually active.  (+)  Vaginal dryness.  (--) vaginal estrogen use.     3)  BM:  (--) constipation/straining.  (--) chronic diarrhea. (--) hematochezia.  (--) fecal incontinence.  (--) fecal smearing/urgency.  (--) incomplete evacuation.      Changes since last visit   Here today for pessary fitting. She reports 1 episode of vaginal bleeding that lasted 1 week. She reports a h/o endometrial ablation. She is not currently using vagina estrogen cream.       Past Medical History  Past Medical History:   Diagnosis Date    Anemia     CKD (chronic kidney disease), stage II 5/16/2016    Depression     Hemoglobin C trait     Hypertension     no meds    Impaired hearing     Lumbago 8/10/2015    Monoclonal gammopathy 11/10/2016    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        Past Surgical History  Past Surgical History:   Procedure Laterality Date    ABLATION  2008    COLONOSCOPY N/A 09/14/2022    Procedure: COLONOSCOPY;  Surgeon: Amarilis Chacon MD;  Location: [facility] ENDO;  Service: Endoscopy;  Laterality: N/A;  positive FIT.Fully vaccinated EC    COLONOSCOPY, SCREENING, HIGH RISK PATIENT N/A 10/8/2024    Procedure: COLONOSCOPY, SCREENING, HIGH RISK PATIENT;  Surgeon: Amarilis Chacon MD;  Location: [facility] ENDO;  Service: Endoscopy;  Laterality: N/A;  previous procedure  done by Dr. Chacon  polyps noted on procedure report 2022 ref by Jaqueline Christensen, MELECIO, PEG, instr. to portal-  10/1/24- [facility]/portal for pc-- pt returned call, pc complete. DBM    HYSTERECTOMY  3/2008    Partial Hysterectomy, ablation of uterus    LASER ABLATION OF THE CERVIX          Hysterectomy: NO    OB History          4    Para   2    Term   2            AB   2    Living             SAB   2    IAB        Ectopic        Multiple        Live Births                   Gynecologic History  LMP: No LMP recorded. Patient has had an ablation.  Last pap: NILM, HPV negative (2023)  Last mammogram: BIRADS 1, Negative (2024)  Colonoscopy: Benign polyps (10/2024), Repeat in 3 years   DEXA: Osteoporosis (2024)    Family History  Family History   Problem Relation Name Age of Onset    Stroke Father Jimpy     Heart failure Mother      No Known Problems Sister      No Known Problems Brother      No Known Problems Brother      No Known Problems Brother      No Known Problems Sister      No Known Problems Sister      No Known Problems Sister      No Known Problems Brother      No Known Problems Sister      Diabetes Sister Oldest     No Known Problems Maternal Aunt      No Known Problems Maternal Uncle      No Known Problems Paternal Aunt      No Known Problems Paternal Uncle      No Known Problems Maternal Grandmother      No Known Problems Maternal Grandfather      No Known Problems Paternal Grandmother      No Known Problems Paternal Grandfather      Amblyopia Neg Hx      Blindness Neg Hx      Cancer Neg Hx      Cataracts Neg Hx      Glaucoma Neg Hx      Hypertension Neg Hx      Macular degeneration Neg Hx      Retinal detachment Neg Hx      Strabismus Neg Hx      Thyroid disease Neg Hx      Breast cancer Neg Hx      Colon cancer Neg Hx      Ovarian cancer Neg Hx        Social History  Tobacco Use History[1].    Social History     Substance and Sexual Activity   Alcohol Use No   .    Social  "History     Substance and Sexual Activity   Drug Use No   .  The patient is .  Resides in [place] [id number].  Employment status: --  Allergies  Review of patient's allergies indicates:   Allergen Reactions    Lactose     Sulfa (sulfonamide antibiotics) Swelling    Latex, natural rubber Rash    Shellfish containing products Rash    Strawberries [strawberry] Rash       Medications  Medications Ordered Prior to Encounter[2]    Review of Systems   See HPI    Urogynecologic Exam  BP (!) 181/130 (BP Location: Left arm, Patient Position: Sitting)   Pulse 96   Ht 5' 4" (1.626 m)   Wt 75.9 kg (167 lb 5.3 oz)   BMI 28.72 kg/m²     GENERAL APPEARANCE:  The patient is a well-developed, well-nourished  female.    PELVIC:    External genitalia:  Normal Bartholins, Skenes and labia bilaterally.    Urethra:  No caruncle, diverticulum or masses.  (+) hypermobility.    Vagina:  Atrophy (+) , no bladder masses or tender, no discharge.    Cervix:  normal appearance  Uterus: normal  Adnexa: Not palpable.    RECTAL:    Deferred    The patient was fit with #5 ring w/ support pessary.  She was able to tolerate the device comfortabley with bending, squatting, valsalva.  She was not able to demonstrate independent removal and placement.  She tolerated the procedure well.        Impression    1. Encounter for fitting and adjustment of pessary    2. Pelvic organ prolapse quantification stage 3 cystocele    3. Urinary urgency    4. Genitourinary syndrome of menopause    5. PMB (postmenopausal bleeding)        1) Stage 3 pelvic organ prolapse, leading edge anterior wall   --Fit w/ #5 ring w/ support pessary    2)  Urinary urgency//Mixed urinary incontinence, urge > = < stress:    --Stop fluids 2 hours prior to bedtime   --Empty bladder every 2-3 hours even if you don't have the urge.  Empty well: wait a minute, lean forward on toilet.    --Try not to go more frequently than once an hour. When you get the urge to urinate after you have " just gone, distract yourself until the urge passes.   --Avoid dietary irritants (see below).    --KEGELS: do 10 in AM and 10 in PM, holding each x 10 seconds.  When you feel urge to go, STOP, KEGEL, and when urge has passed, then go to bathroom.    --Keep appointment for Pelvic Floor Physical Therapy (PT).   --URGE LEAKAGE: consider anticholinergic.   Takes 2-4 weeks to see if will have effect.  For dry mouth: use Biotene mouthwash, get sour, sugar free lozenge or gum.  May increase constipation.   --STRESS LEAKAGE (leak with laugh/cough/sneeze):  Pessary vs. Mid-urethral sling surgery vs Impressa.   -- PVR (post void residual): 150 mL w/ pessary in place   --A1c 5.8 (04/2024)    3) GSM  --Use 0.5-1 grams of estrogen cream in vagina with applicator or dime-sized amount with finger (as far as can reach internally) nightly x 2 weeks, then twice a week thereafter.  You can also apply a dime-sized amount with your finger around the vaginal opening and inner lips at same frequency.     4) PMB  --Pelvic ultrasound ordered for further evaluation  --Will remove pessary prior to ultrasound appointment     RTC for pelvic ultrasound follow-up    Face to Face time with patient: 20    25 minutes of total time spent on the encounter, which includes face to face time and non-face to face time preparing to see the patient (eg, review of tests), Obtaining and/or reviewing separately obtained history, Documenting clinical information in the electronic or other health record, Independently interpreting results (not separately reported) and communicating results to the patient/family/caregiver, or Care coordination (not separately reported).       Isabel Tello  Female Pelvic Medicine and Reconstructive Surgery  [facility] [place], [place]           [1]   Social History  Tobacco Use   Smoking Status Never    Passive exposure: Never   Smokeless Tobacco Never   Tobacco Comments    Pt dont smoke   [2]   Current Outpatient  Medications on File Prior to Visit   Medication Sig Dispense Refill    ergocalciferol, vitamin D2, (VITAMIN D ORAL) Take by mouth.      estradioL (ESTRACE) 0.01 % (0.1 mg/gram) vaginal cream Use 0.5-1 grams vaginally every night for 2 weeks, then twice weekly 42.5 g 3    cetirizine (ZYRTEC) 10 MG tablet Take 1 tablet (10 mg total) by mouth daily as needed for Allergies or Rhinitis. 90 tablet 3     No current facility-administered medications on file prior to visit.

## 2025-04-03 ENCOUNTER — OFFICE VISIT (OUTPATIENT)
Dept: UROGYNECOLOGY | Facility: CLINIC | Age: 65
End: 2025-04-03
Payer: MEDICARE

## 2025-04-03 VITALS
SYSTOLIC BLOOD PRESSURE: 181 MMHG | BODY MASS INDEX: 28.56 KG/M2 | DIASTOLIC BLOOD PRESSURE: 130 MMHG | HEIGHT: 64 IN | WEIGHT: 167.31 LBS | HEART RATE: 96 BPM

## 2025-04-03 DIAGNOSIS — Z46.89 ENCOUNTER FOR FITTING AND ADJUSTMENT OF PESSARY: Primary | ICD-10-CM

## 2025-04-03 DIAGNOSIS — N95.8 GENITOURINARY SYNDROME OF MENOPAUSE: ICD-10-CM

## 2025-04-03 DIAGNOSIS — R39.15 URINARY URGENCY: ICD-10-CM

## 2025-04-03 DIAGNOSIS — N95.0 PMB (POSTMENOPAUSAL BLEEDING): ICD-10-CM

## 2025-04-03 DIAGNOSIS — N81.10 PELVIC ORGAN PROLAPSE QUANTIFICATION STAGE 3 CYSTOCELE: ICD-10-CM

## 2025-04-03 PROCEDURE — 1159F MED LIST DOCD IN RCRD: CPT | Mod: CPTII,S$GLB,, | Performed by: NURSE PRACTITIONER

## 2025-04-03 PROCEDURE — 3080F DIAST BP >= 90 MM HG: CPT | Mod: CPTII,S$GLB,, | Performed by: NURSE PRACTITIONER

## 2025-04-03 PROCEDURE — 99999 PR PBB SHADOW E&M-EST. PATIENT-LVL III: CPT | Mod: PBBFAC,,, | Performed by: NURSE PRACTITIONER

## 2025-04-03 PROCEDURE — 1126F AMNT PAIN NOTED NONE PRSNT: CPT | Mod: CPTII,S$GLB,, | Performed by: NURSE PRACTITIONER

## 2025-04-03 PROCEDURE — 3077F SYST BP >= 140 MM HG: CPT | Mod: CPTII,S$GLB,, | Performed by: NURSE PRACTITIONER

## 2025-04-03 PROCEDURE — 3288F FALL RISK ASSESSMENT DOCD: CPT | Mod: CPTII,S$GLB,, | Performed by: NURSE PRACTITIONER

## 2025-04-03 PROCEDURE — 3008F BODY MASS INDEX DOCD: CPT | Mod: CPTII,S$GLB,, | Performed by: NURSE PRACTITIONER

## 2025-04-03 PROCEDURE — 99213 OFFICE O/P EST LOW 20 MIN: CPT | Mod: S$GLB,,, | Performed by: NURSE PRACTITIONER

## 2025-04-03 PROCEDURE — 1101F PT FALLS ASSESS-DOCD LE1/YR: CPT | Mod: CPTII,S$GLB,, | Performed by: NURSE PRACTITIONER

## 2025-04-07 NOTE — PATIENT INSTRUCTIONS
1) Stage 3 pelvic organ prolapse, leading edge anterior wall   --Fit w/ #5 ring w/ support pessary    2)  Urinary urgency//Mixed urinary incontinence, urge > = < stress:    --Stop fluids 2 hours prior to bedtime   --Empty bladder every 2-3 hours even if you don't have the urge.  Empty well: wait a minute, lean forward on toilet.    --Try not to go more frequently than once an hour. When you get the urge to urinate after you have just gone, distract yourself until the urge passes.   --Avoid dietary irritants (see below).    --KEGELS: do 10 in AM and 10 in PM, holding each x 10 seconds.  When you feel urge to go, STOP, KEGEL, and when urge has passed, then go to bathroom.    --Keep appointment for Pelvic Floor Physical Therapy (PT).   --URGE LEAKAGE: consider anticholinergic.   Takes 2-4 weeks to see if will have effect.  For dry mouth: use Biotene mouthwash, get sour, sugar free lozenge or gum.  May increase constipation.   --STRESS LEAKAGE (leak with laugh/cough/sneeze):  Pessary vs. Mid-urethral sling surgery vs Impressa.   -- PVR (post void residual): 150 mL w/ pessary in place   --A1c 5.8 (04/2024)    3) GSM  --Use 0.5-1 grams of estrogen cream in vagina with applicator or dime-sized amount with finger (as far as can reach internally) nightly x 2 weeks, then twice a week thereafter.  You can also apply a dime-sized amount with your finger around the vaginal opening and inner lips at same frequency.     4) PMB  --Pelvic ultrasound ordered for further evaluation  --Will remove pessary prior to ultrasound appointment

## 2025-04-09 ENCOUNTER — CLINICAL SUPPORT (OUTPATIENT)
Dept: REHABILITATION | Facility: OTHER | Age: 65
End: 2025-04-09
Payer: MEDICARE

## 2025-04-09 DIAGNOSIS — N81.89 OTHER FEMALE GENITAL PROLAPSE: ICD-10-CM

## 2025-04-09 PROCEDURE — 97530 THERAPEUTIC ACTIVITIES: CPT

## 2025-04-09 PROCEDURE — 97112 NEUROMUSCULAR REEDUCATION: CPT

## 2025-04-09 PROCEDURE — 97162 PT EVAL MOD COMPLEX 30 MIN: CPT

## 2025-04-09 NOTE — PATIENT INSTRUCTIONS
Long Holds  Hold each squeeze for 3 seconds. Rest 3 seconds.   Repeat 10 times. Perform 3 sets/day.     Quick Flicks  Hold 1 second. Rest 1 second (or to complete relaxation)  Repeat 10 times. Perform 3 sets/day.

## 2025-04-09 NOTE — PROGRESS NOTES
Outpatient Rehab    Physical Therapy Evaluation    Patient Name: Omid Rascon  MRN: [id number]  YOB: 1960  Encounter Date: 4/9/2025    Therapy Diagnosis:   Encounter Diagnosis   Name Primary?    Other female genital prolapse      Physician: Chuy Mishra III, MD    Physician Orders: Eval and Treat  Medical Diagnosis: Other female genital prolapse    Visit # / Visits Authorized:  1 / 1  Insurance Authorization Period: 2/25/2025 to 2/25/2026  Date of Evaluation: 4/9/2025  Plan of Care Certification: 4/9/2025 to 7/9/2025     Time In: 1150   Time Out: 1245  Total Time: 55   Total Billable Time: 55    Intake Outcome Measure for FOTO Survey    Therapist reviewed FOTO scores for Omid Rascon on 4/9/2025.   FOTO report - see Media section or FOTO account episode details.     Intake Score:  %         Subjective   History of Present Illness  Omid is a 64 y.o. female who reports to physical therapy with a chief concern of Pelvic Organ Prolapse.                 History of Present Condition/Illness: Grade 3 anterior vaginal prolapse noted in 2022 post fall on back from ladder. Was scheduled for surgery with Dr. Cabrales , but it was cancelled. She is scheduled for surgery consultation with Dr. Solitario in May. Is currently using pessary, limiting irritants and practicing delayed urination. Has noted significant improvements in urinary function this week. Long history of chronic low back pain. Increased pain noted with prolonged sitting, standing > 15-30 minutes.    Pain  No Pain Reported: Yes                 Bladder/Bowel Habits and Symptoms  Bladder Habits  Urine Stream: Normal  Bladder Emptying: Complete  Frequency of Bladder Urgency: Frequently  Bladder Urge Triggers: Other (Comment)  Other Bladder Urge Triggers: when stressed  Urinary Urge/Sensation: Sudden  Ability to Delay Urination: More than 1 hour  Daytime Frequency of Urination: every 2 hours  Nighttime Frequency of Urination: 2x  Just  in Case Voiding: Yes  Estimated Fluid Intake: limits to 36 oz of a combination of water, juice    Urinary Symptoms  Urine Leakage Amount: Few drops  Frequency of Urinary Incontinence: Once per day  Urinary Incontinence Aggravating Factors: Coughing, Sneezing  Post Void Dribble: No  Able to Stop the Flow of Urine: Yes    Bowel Habits  [place] Stool Form Scale: Type 3-4  Frequency of Bowel Movements: 1 time per day  Frequency of Bowel Urgency: Never  Bowel Straining/Pushing: Never  Bowel Incomplete Emptying: Never  Abdominal Fullness/Bloating: Never  Pain with Bowel Movements: Never  Splinting During Bowel Movements: Never  Constipation: Never  Additional Bowel Habit Details: Uses prune juice to maintain bowel regularity     Bowel Symptoms  Bowel Incontinence Issues: None    Bladder or Bowel Leakage Protection  Protection for Leakage: Disposable underwear  Number of Disposable Underwear Per Day: 1        Sexual Function  Sexually Active: No  Additional Sexual Function Details: Not currently sexually active with  due to prolapse    Treatment History  Treatments  Previously Received Treatments: No  Currently Receiving Treatments: Yes  Current Treatments: Other (Comment)  Other Current Treatments: pessary    Personal Factors  Details on the patient's current diet include: DASH diet, increased fiber The patient's physical activity or sport participation includes: gardening, walking limited until pessary insertion  On average, the length of time per day that the patient engages in moderate to strenuous exercise is 30 min.   Level of stress was reported to be Very much.          Living Arrangements  Living Situation  Housing: Home independently  Living Arrangements: Spouse/significant other, Family members        Employment  Employment Status: Retired   Retired from teaching       Past Medical History/Physical Systems Review:   Margarethca Colleen Rascon  has a past medical history of Anemia, CKD (chronic kidney disease),  stage II, Depression, Hemoglobin C trait, Hypertension, Impaired hearing, Lumbago, Monoclonal gammopathy, Nuclear sclerosis of both eyes, and Tortuous aorta.    Omid Rascon  has a past surgical history that includes Laser ablation of the cervix; Ablation (2008); Colonoscopy (N/A, 09/14/2022); Hysterectomy (3/2008); and colonoscopy, screening, high risk patient (N/A, 10/8/2024).    Omid has a current medication list which includes the following prescription(s): cetirizine, ergocalciferol (vitamin d2), and estradiol.    Review of patient's allergies indicates:   Allergen Reactions    Lactose     Sulfa (sulfonamide antibiotics) Swelling    Latex, natural rubber Rash    Shellfish containing products Rash    Strawberries [strawberry] Rash        Objective   Pelvic External Observations  Consent for Examination: Yes, Chaperone Present: No    Abdominal Assessment  Additional Abdominal Assessment Details: Limited lower rib expansion with deep inhalation; improved following tactile and verbal cueing    Pelvic Assessment  Perineal Skin Quality: Unremarkable  Perineal Descent Bearing Down: Present  Volitional Contraction: Present  Volitional Relaxation: Present  Volitional Bearing Down: Present          Pelvic Floor Palpation  Pelvic Floor Exams Performed: External Pelvic and Internal Vaginal  Pelvic Floor Exams Not Performed: Internal Rectal  External Pelvic Palpation Details: WNL  Internal Vaginal Palpation Details: pessary in place                                        Pelvic Floor Special Tests  Single Digit Intravaginal Assessment  Intravaginal Pelvic Floor Strength: 3  Intravaginal Pelvic Floor Endurance (sec): 4  Intravaginal Pelvic Floor Repetitions: 2  Intravaginal Pelvic Floor Quick Flicks: 5  Intravaginal Pelvic Floor Co-Contraction Substitution: Absent  Intravaginal Pelvic Floor Relaxation Response: Normal  Right Vaginal Sensation: Intact  Left Vaginal Sensation: Intact              Treatment:  Balance/Neuromuscular Re-Education  NMR 1: daphragmatic breathing  NMR 2: coordinated kegel + breathing  Therapeutic Activity  TA 1: role of PT, plan of care, involved anatomy and pathology,  goals, rational for treatment and exercise, scheduling limitations    Time Entry(in minutes):  PT Evaluation (Moderate) Time Entry: 30  Neuromuscular Re-Education Time Entry: 10  Therapeutic Activity Time Entry: 15    Assessment & Plan   Assessment  Omid presents with a condition of Moderate complexity.   Presentation of Symptoms: Changing       Functional Limitations: Completing work/school activities, Functional mobility, Participating in leisure activities, Sexual function, Activity tolerance  Impairments: Abnormal coordination, Abnormal muscle tone, Abnormal or restricted range of motion, Activity intolerance, Impaired physical strength, Lack of appropriate home exercise program    Patient Goal for Therapy (PT): improve prolapse, prevent return  Prognosis: Good  Prognosis Details: Omid  presents with poor trunk stability, decreased pelvic muscle strength, decreased endurance of the pelvic muscles, poor quality of pelvic muscle contraction, and poor coordination of pelvic floor muscles. Based on presentation, these deficits have likely contributed to reported urinary and fecal leakage as well as pelvic organ prolapse. Omid  would benefit from continued pelvic floor physical therapy.     Plan  From a physical therapy perspective, the patient would benefit from: Skilled Rehab Services    Planned therapy interventions include: Therapeutic exercise, Therapeutic activities, Neuromuscular re-education, and Manual therapy.    Planned modalities to include: Biofeedback, Electrical stimulation - attended, Ultrasound, and Other (Comment). Dry Needling      Visit Frequency: 1 times Per Week for 10 Weeks.       This plan was discussed with Patient.   Discussion participants: Agreed Upon Plan of Care              Patient's spiritual, cultural, and educational needs considered and patient agreeable to plan of care and goals.           Goals:   Active       Long Term Goals       Pt to report elimination of incontinence with ADLs to demonstrate improved pelvic floor muscle strength and coordination         Start:  04/09/25    Expected End:  07/02/25            Pt to report a decrease in pelvic pressure and fullness to no more than 20% of the time to demonstrate improving pelvic support of pelvic structures.        Start:  04/09/25    Expected End:  07/02/25            Pt to increase pelvic floor strength to at least 4/5 to demonstrate improved strength needed for continence with ADLs.         Start:  04/09/25    Expected End:  07/02/25               Short Term Goals       Pt to be able to perform a 5  second kegel x 10 reps to demonstrate improving strength and endurance needed for continence.        Start:  04/09/25    Expected End:  06/04/25             Pt to report being able to sneeze without incontinence demonstrating improved pelvic floor strength and coordination to improve confidence in social situations         Start:  04/09/25    Expected End:  06/04/25             Pt to report a decrease in pelvic pressure and fullness to no more than 20% of the time to demonstrate improving pelvic support of pelvic structures.       Start:  04/09/25    Expected End:  06/04/25                RISHABH CHAUDHRY, PT

## 2025-04-10 ENCOUNTER — OFFICE VISIT (OUTPATIENT)
Dept: OPTOMETRY | Facility: CLINIC | Age: 65
End: 2025-04-10
Payer: MEDICARE

## 2025-04-10 DIAGNOSIS — Z01.00 ROUTINE EYE EXAM: Primary | ICD-10-CM

## 2025-04-10 DIAGNOSIS — H52.7 REFRACTIVE ERROR: ICD-10-CM

## 2025-04-10 DIAGNOSIS — H25.13 NUCLEAR SCLEROSIS OF BOTH EYES: ICD-10-CM

## 2025-04-10 PROCEDURE — 92014 COMPRE OPH EXAM EST PT 1/>: CPT | Mod: S$GLB,,, | Performed by: OPTOMETRIST

## 2025-04-10 PROCEDURE — 92015 DETERMINE REFRACTIVE STATE: CPT | Mod: S$GLB,,, | Performed by: OPTOMETRIST

## 2025-04-10 PROCEDURE — 1160F RVW MEDS BY RX/DR IN RCRD: CPT | Mod: CPTII,S$GLB,, | Performed by: OPTOMETRIST

## 2025-04-10 PROCEDURE — 1159F MED LIST DOCD IN RCRD: CPT | Mod: CPTII,S$GLB,, | Performed by: OPTOMETRIST

## 2025-04-10 PROCEDURE — 99999 PR PBB SHADOW E&M-EST. PATIENT-LVL II: CPT | Mod: PBBFAC,,, | Performed by: OPTOMETRIST

## 2025-04-10 NOTE — PROGRESS NOTES
Subjective:       Patient ID: Omid Rascon is a 64 y.o. female      Chief Complaint   Patient presents with    Concerns About Ocular Health     History of Present Illness  Dls: 1/31/24 Dr. Sandoval     65 y/o female presents today with no complaints   Pt wears pal's.     No tearing   No itching   No burning   No pain   No ha's   No floaters   No flashes     Eye meds   None     Pohx:   None     Fohx:   None       Assessment/Plan:     1. Routine eye exam (Primary)  Human VCP    2. Refractive error  Optional change. Educated patient on refractive error and discussed lens options. Dispensed updated spectacle Rx. Educated about adaptation period to new specs.    Eyeglass Final Rx       Eyeglass Final Rx         Sphere Cylinder Axis Add    Right +1.75 +0.75 015 +2.50    Left +1.75 +0.75 165 +2.50      Expiration Date: 4/10/2026                      3. Nuclear sclerosis of both eyes  Educated pt on presence of cataracts and effects on vision. No surgery at this time. Recheck in one year, sooner PRN.      Follow up in about 1 year (around 4/10/2026).

## 2025-04-14 ENCOUNTER — PATIENT MESSAGE (OUTPATIENT)
Dept: ADMINISTRATIVE | Facility: OTHER | Age: 65
End: 2025-04-14
Payer: MEDICARE

## 2025-05-01 ENCOUNTER — HOSPITAL ENCOUNTER (OUTPATIENT)
Dept: RADIOLOGY | Facility: OTHER | Age: 65
Discharge: HOME | End: 2025-05-01
Attending: NURSE PRACTITIONER
Payer: MEDICARE

## 2025-05-01 ENCOUNTER — OFFICE VISIT (OUTPATIENT)
Dept: UROGYNECOLOGY | Facility: CLINIC | Age: 65
End: 2025-05-01
Payer: MEDICARE

## 2025-05-01 VITALS
SYSTOLIC BLOOD PRESSURE: 180 MMHG | BODY MASS INDEX: 28.76 KG/M2 | DIASTOLIC BLOOD PRESSURE: 120 MMHG | WEIGHT: 168.44 LBS | HEIGHT: 64 IN

## 2025-05-01 DIAGNOSIS — R39.15 URINARY URGENCY: ICD-10-CM

## 2025-05-01 DIAGNOSIS — N95.0 PMB (POSTMENOPAUSAL BLEEDING): ICD-10-CM

## 2025-05-01 DIAGNOSIS — N95.8 GENITOURINARY SYNDROME OF MENOPAUSE: ICD-10-CM

## 2025-05-01 DIAGNOSIS — Z46.89 PESSARY MAINTENANCE: Primary | ICD-10-CM

## 2025-05-01 DIAGNOSIS — N81.10 PELVIC ORGAN PROLAPSE QUANTIFICATION STAGE 3 CYSTOCELE: ICD-10-CM

## 2025-05-01 PROCEDURE — 76856 US EXAM PELVIC COMPLETE: CPT | Mod: TC,HCNC

## 2025-05-01 PROCEDURE — 3008F BODY MASS INDEX DOCD: CPT | Mod: CPTII,HCNC,S$GLB, | Performed by: NURSE PRACTITIONER

## 2025-05-01 PROCEDURE — 1126F AMNT PAIN NOTED NONE PRSNT: CPT | Mod: CPTII,HCNC,S$GLB, | Performed by: NURSE PRACTITIONER

## 2025-05-01 PROCEDURE — 1159F MED LIST DOCD IN RCRD: CPT | Mod: CPTII,HCNC,S$GLB, | Performed by: NURSE PRACTITIONER

## 2025-05-01 PROCEDURE — 76856 US EXAM PELVIC COMPLETE: CPT | Mod: 26,HCNC,, | Performed by: RADIOLOGY

## 2025-05-01 PROCEDURE — 3077F SYST BP >= 140 MM HG: CPT | Mod: CPTII,HCNC,S$GLB, | Performed by: NURSE PRACTITIONER

## 2025-05-01 PROCEDURE — 3288F FALL RISK ASSESSMENT DOCD: CPT | Mod: CPTII,HCNC,S$GLB, | Performed by: NURSE PRACTITIONER

## 2025-05-01 PROCEDURE — 3080F DIAST BP >= 90 MM HG: CPT | Mod: CPTII,HCNC,S$GLB, | Performed by: NURSE PRACTITIONER

## 2025-05-01 PROCEDURE — 1101F PT FALLS ASSESS-DOCD LE1/YR: CPT | Mod: CPTII,HCNC,S$GLB, | Performed by: NURSE PRACTITIONER

## 2025-05-01 PROCEDURE — 99214 OFFICE O/P EST MOD 30 MIN: CPT | Mod: HCNC,S$GLB,, | Performed by: NURSE PRACTITIONER

## 2025-05-01 PROCEDURE — 76830 TRANSVAGINAL US NON-OB: CPT | Mod: 26,HCNC,, | Performed by: RADIOLOGY

## 2025-05-01 PROCEDURE — 99999 PR PBB SHADOW E&M-EST. PATIENT-LVL III: CPT | Mod: PBBFAC,HCNC,, | Performed by: NURSE PRACTITIONER

## 2025-05-01 NOTE — PROGRESS NOTES
[facility] - UROGYNECOLOGY  [address] [id number]    Omid Rascon  [id number]  1960  May 1, 2025    Consulting Physician: No ref. provider found   GYN: Chuy Mishra III, MD   Primary M.D.: Ti Hendrickson MD    Chief Complaint   Patient presents with    Follow-up     02/27/2025  HPI:     1)  UI:  (+) KYLE  no (+) UUI  X 3years.  (+) pads.  Daytime frequency: Q 20-30 minutes. Nocturia: Yes.(--) dysuria,  (--) hematuria,  (--) frequent UTIs.  (--) complete bladder emptying.     2)  POP:  Present. below introitus.  Symptoms:(+).  (--) vaginal bleeding. (--) vaginal discharge. (+) sexually active.  (+)  Vaginal dryness.  (--) vaginal estrogen use.     3)  BM:  (--) constipation/straining.  (--) chronic diarrhea. (--) hematochezia.  (--) fecal incontinence.  (--) fecal smearing/urgency.  (--) incomplete evacuation.      POP-Q:  Aa +2; Ba +2; C -3; Ap 0; Bp 0; D -4.  Genital hiatus 4, perineal body 2 total vaginal length 8.     PVR: 150 mL w/ pessary in place. Unable to pass catheter past urethra w/o pessary in place     04/2025  Here today for pessary fitting. She reports 1 episode of vaginal bleeding that lasted 1 week. She reports a h/o endometrial ablation. She is not currently using vagina estrogen cream.       Changes since last visit   Here today for pessary removal/reinsertion for pelvic ultrasound. She has been doing well with pessary and has noticed an  improvement in her urgency/frequency and emptying of her bladder. She has been going to pelvic floor PT. She is still interested in discussing surgical management of her prolapse. She denies any further vaginal bleeding since her last visit.       Past Medical History  Past Medical History:   Diagnosis Date    Anemia     CKD (chronic kidney disease), stage II 5/16/2016    Depression     Hemoglobin C trait     Hypertension     no meds    Impaired hearing     Lumbago 8/10/2015    Monoclonal gammopathy 11/10/2016    Nuclear  sclerosis of both eyes 10/7/2019    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        Past Surgical History  Past Surgical History:   Procedure Laterality Date    ABLATION      COLONOSCOPY N/A 2022    Procedure: COLONOSCOPY;  Surgeon: Amarilis Chacon MD;  Location: [facility] ENDO;  Service: Endoscopy;  Laterality: N/A;  positive FIT.Fully vaccinated EC    COLONOSCOPY, SCREENING, HIGH RISK PATIENT N/A 10/8/2024    Procedure: COLONOSCOPY, SCREENING, HIGH RISK PATIENT;  Surgeon: Amarilis Chacon MD;  Location: [facility] ENDO;  Service: Endoscopy;  Laterality: N/A;  previous procedure done by Dr. Chacon  polyps noted on procedure report 2022 ref by Jaqueline Christensen DNP, PEG, instr. to portal-  10/1/24- [facility]/portal for pc-- pt returned call, pc complete. DBM    HYSTERECTOMY  3/2008    Partial Hysterectomy, ablation of uterus    LASER ABLATION OF THE CERVIX          Hysterectomy: NO    OB History          4    Para   2    Term   2            AB   2    Living             SAB   2    IAB        Ectopic        Multiple        Live Births                   Gynecologic History  LMP: No LMP recorded. Patient has had an ablation.  Last pap: NILM, HPV negative (2023)  Last mammogram: BIRADS 1, Negative (2024)  Colonoscopy: Benign polyps (10/2024), Repeat in 3 years   DEXA: Osteoporosis (2024)    Family History  Family History   Problem Relation Name Age of Onset    Stroke Father Jimpy     Heart failure Mother      No Known Problems Sister      No Known Problems Brother      No Known Problems Brother      No Known Problems Brother      No Known Problems Sister      No Known Problems Sister      No Known Problems Sister      No Known Problems Brother      No Known Problems Sister      Diabetes Sister Oldest     No Known Problems Maternal Aunt      No Known Problems Maternal Uncle      No Known Problems Paternal Aunt      No Known Problems Paternal Uncle      No Known Problems Maternal Grandmother      No Known  "Problems Maternal Grandfather      No Known Problems Paternal Grandmother      No Known Problems Paternal Grandfather      Amblyopia Neg Hx      Blindness Neg Hx      Cancer Neg Hx      Cataracts Neg Hx      Glaucoma Neg Hx      Hypertension Neg Hx      Macular degeneration Neg Hx      Retinal detachment Neg Hx      Strabismus Neg Hx      Thyroid disease Neg Hx      Breast cancer Neg Hx      Colon cancer Neg Hx      Ovarian cancer Neg Hx        Social History  Tobacco Use History[1].    Social History     Substance and Sexual Activity   Alcohol Use No   .    Social History     Substance and Sexual Activity   Drug Use No   .  The patient is .  Resides in [place] [address].  Employment status: --  Allergies  Review of patient's allergies indicates:   Allergen Reactions    Lactose     Sulfa (sulfonamide antibiotics) Swelling    Latex, natural rubber Rash    Shellfish containing products Rash    Strawberries [strawberry] Rash       Medications  Medications Ordered Prior to Encounter[2]    Review of Systems   See HPI    Urogynecologic Exam  BP (!) 180/120 (BP Location: Right arm, Patient Position: Sitting)   Ht 5' 4" (1.626 m)   Wt 76.4 kg (168 lb 6.9 oz)   BMI 28.91 kg/m²     GENERAL APPEARANCE:  The patient is a well-developed, well-nourished  female.    PELVIC:    External genitalia:  Normal Bartholins, Skenes and labia bilaterally.    Urethra:  No caruncle, diverticulum or masses.  (+) hypermobility.    Vagina:  Atrophy (+) , no bladder masses or tender, no discharge.   #5 ring w/ support pessary removed cleaned and reinserted after her pelvic ultrasound  Cervix:  normal appearance  Uterus: normal  Adnexa: Not palpable.    RECTAL:    Deferred    PVR:      Impression    1. Pessary maintenance    2. Pelvic organ prolapse quantification stage 3 cystocele    3. Urinary urgency    4. Genitourinary syndrome of menopause    5. PMB (postmenopausal bleeding)        1) Stage 3 pelvic organ prolapse, leading edge " anterior wall   --Fit w/ #5 ring w/ support pessary; advised to remove 2-3 days prior to consult with Dr. Solitario     2)  Urinary urgency//Mixed urinary incontinence, urge > = < stress -IMPROVED WITH PESSARY   --Stop fluids 2 hours prior to bedtime   --Empty bladder every 2-3 hours even if you don't have the urge.  Empty well: wait a minute, lean forward on toilet.    --Try not to go more frequently than once an hour. When you get the urge to urinate after you have just gone, distract yourself until the urge passes.   --Avoid dietary irritants (see sheet).    --KEGELS: do 10 in AM and 10 in PM, holding each x 10 seconds.  When you feel urge to go, STOP, KEGEL, and when urge has passed, then go to bathroom.    --Continue Pelvic Floor Physical Therapy (PT).   --URGE LEAKAGE: consider anticholinergic.   Takes 2-4 weeks to see if will have effect.  For dry mouth: use Biotene mouthwash, get sour, sugar free lozenge or gum.  May increase constipation.   --STRESS LEAKAGE (leak with laugh/cough/sneeze):  Pessary vs. Mid-urethral sling surgery vs Impressa.   -- PVR (post void residual): 80 mL w/ pessary in place  --A1c 5.8 (04/2024)    3) Vaginal dryness   --Use 0.5-1 grams of estrogen cream in vagina with applicator or dime-sized amount with finger (as far as can reach internally) nightly twice a week thereafter.  You can also apply a dime-sized amount with your finger around the vaginal opening and inner lips at same frequency.     4) PMB  --Will call with pelvic ultrasound results     RTC for surgical consult with Dr. Solitario    Face to Face time with patient: 30    35 minutes of total time spent on the encounter, which includes face to face time and non-face to face time preparing to see the patient (eg, review of tests), Obtaining and/or reviewing separately obtained history, Documenting clinical information in the electronic or other health record, Independently interpreting results (not separately reported) and  communicating results to the patient/family/caregiver, or Care coordination (not separately reported).     Isabel Tello  Female Pelvic Medicine and Reconstructive Surgery  [facility] [place], [place]             [1]   Social History  Tobacco Use   Smoking Status Never    Passive exposure: Never   Smokeless Tobacco Never   Tobacco Comments    Pt dont smoke   [2]   Current Outpatient Medications on File Prior to Visit   Medication Sig Dispense Refill    ergocalciferol, vitamin D2, (VITAMIN D ORAL) Take by mouth.      estradioL (ESTRACE) 0.01 % (0.1 mg/gram) vaginal cream Use 0.5-1 grams vaginally every night for 2 weeks, then twice weekly 42.5 g 3    cetirizine (ZYRTEC) 10 MG tablet Take 1 tablet (10 mg total) by mouth daily as needed for Allergies or Rhinitis. 90 tablet 3     No current facility-administered medications on file prior to visit.

## 2025-05-01 NOTE — PATIENT INSTRUCTIONS
TAKE YOUR PESSARY OUT 2-3 DAYS PRIOR TO YOUR APPT WITH DR. SOARES     1) Stage 3 pelvic organ prolapse, leading edge anterior wall   --Fit w/ #5 ring w/ support pessary    2)  Urinary urgency//Mixed urinary incontinence, urge > = < stress:  IMPROVED WITH PESSARY   --Stop fluids 2 hours prior to bedtime   --Empty bladder every 2-3 hours even if you don't have the urge.  Empty well: wait a minute, lean forward on toilet.    --Try not to go more frequently than once an hour. When you get the urge to urinate after you have just gone, distract yourself until the urge passes.   --Avoid dietary irritants (see below).    --KEGELS: do 10 in AM and 10 in PM, holding each x 10 seconds.  When you feel urge to go, STOP, KEGEL, and when urge has passed, then go to bathroom.    --Continue Pelvic Floor Physical Therapy (PT).   --URGE LEAKAGE: consider anticholinergic.   Takes 2-4 weeks to see if will have effect.  For dry mouth: use Biotene mouthwash, get sour, sugar free lozenge or gum.  May increase constipation.   --STRESS LEAKAGE (leak with laugh/cough/sneeze):  Pessary vs. Mid-urethral sling surgery vs Impressa.   -- PVR (post void residual): 80 mL w/   --A1c 5.8 (04/2024)    3) Vaginal dryness   --Use 0.5-1 grams of estrogen cream in vagina with applicator or dime-sized amount with finger (as far as can reach internally) nightly x 2 weeks, then twice a week thereafter.  You can also apply a dime-sized amount with your finger around the vaginal opening and inner lips at same frequency.     4) PMB  --Will call with pelvic ultrasound results

## 2025-05-01 NOTE — PROGRESS NOTES
[facility] - UROGYNECOLOGY  [address] [address]    Omid Rascon  [id number]  1960  May 1, 2025    Consulting Physician: No ref. provider found   GYN: Chuy Mishra III, MD   Primary M.D.: Ti Hendrickson MD    Chief Complaint   Patient presents with    Follow-up     02/27/2025  HPI:     1)  UI:  (+) KYLE  no (+) UUI  X 3years.  (+) pads.  Daytime frequency: Q 20-30 minutes. Nocturia: Yes.(--) dysuria,  (--) hematuria,  (--) frequent UTIs.  (--) complete bladder emptying.     2)  POP:  Present. below introitus.  Symptoms:(+).  (--) vaginal bleeding. (--) vaginal discharge. (+) sexually active.  (+)  Vaginal dryness.  (--) vaginal estrogen use.     3)  BM:  (--) constipation/straining.  (--) chronic diarrhea. (--) hematochezia.  (--) fecal incontinence.  (--) fecal smearing/urgency.  (--) incomplete evacuation.      Changes since last visit   Here today for pessary fitting. She reports 1 episode of vaginal bleeding that lasted 1 week. She reports a h/o endometrial ablation. She is not currently using vagina estrogen cream.       Past Medical History  Past Medical History:   Diagnosis Date    Anemia     CKD (chronic kidney disease), stage II 5/16/2016    Depression     Hemoglobin C trait     Hypertension     no meds    Impaired hearing     Lumbago 8/10/2015    Monoclonal gammopathy 11/10/2016    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        Past Surgical History  Past Surgical History:   Procedure Laterality Date    ABLATION  2008    COLONOSCOPY N/A 09/14/2022    Procedure: COLONOSCOPY;  Surgeon: Amarilis Chacon MD;  Location: [facility] ENDO;  Service: Endoscopy;  Laterality: N/A;  positive FIT.Fully vaccinated EC    COLONOSCOPY, SCREENING, HIGH RISK PATIENT N/A 10/8/2024    Procedure: COLONOSCOPY, SCREENING, HIGH RISK PATIENT;  Surgeon: Amarilis Chacon MD;  Location: [facility] ENDO;  Service: Endoscopy;  Laterality: N/A;  previous procedure done by  Dr. Chacon  polyps noted on procedure report 2022 ref by Jaqueline Christensen, MELECIO, PEG, instr. to portal-  10/1/24- [facility]/portal for pc-- pt returned call, pc complete. DBM    HYSTERECTOMY  3/2008    Partial Hysterectomy, ablation of uterus    LASER ABLATION OF THE CERVIX          Hysterectomy: NO    OB History          4    Para   2    Term   2            AB   2    Living             SAB   2    IAB        Ectopic        Multiple        Live Births                   Gynecologic History  LMP: No LMP recorded. Patient has had an ablation.  Last pap: NILM, HPV negative (2023)  Last mammogram: BIRADS 1, Negative (2024)  Colonoscopy: Benign polyps (10/2024), Repeat in 3 years   DEXA: Osteoporosis (2024)    Family History  Family History   Problem Relation Name Age of Onset    Stroke Father Jimpy     Heart failure Mother      No Known Problems Sister      No Known Problems Brother      No Known Problems Brother      No Known Problems Brother      No Known Problems Sister      No Known Problems Sister      No Known Problems Sister      No Known Problems Brother      No Known Problems Sister      Diabetes Sister Oldest     No Known Problems Maternal Aunt      No Known Problems Maternal Uncle      No Known Problems Paternal Aunt      No Known Problems Paternal Uncle      No Known Problems Maternal Grandmother      No Known Problems Maternal Grandfather      No Known Problems Paternal Grandmother      No Known Problems Paternal Grandfather      Amblyopia Neg Hx      Blindness Neg Hx      Cancer Neg Hx      Cataracts Neg Hx      Glaucoma Neg Hx      Hypertension Neg Hx      Macular degeneration Neg Hx      Retinal detachment Neg Hx      Strabismus Neg Hx      Thyroid disease Neg Hx      Breast cancer Neg Hx      Colon cancer Neg Hx      Ovarian cancer Neg Hx        Social History  Tobacco Use History[1].    Social History     Substance and Sexual Activity   Alcohol Use No   .    Social History  "    Substance and Sexual Activity   Drug Use No   .  The patient is .  Resides in [place] [address].  Employment status: --  Allergies  Review of patient's allergies indicates:   Allergen Reactions    Lactose     Sulfa (sulfonamide antibiotics) Swelling    Latex, natural rubber Rash    Shellfish containing products Rash    Strawberries [strawberry] Rash       Medications  Medications Ordered Prior to Encounter[2]    Review of Systems   See HPI    Urogynecologic Exam  BP (!) 180/120 (BP Location: Right arm, Patient Position: Sitting)   Ht 5' 4" (1.626 m)   Wt 76.4 kg (168 lb 6.9 oz)   BMI 28.91 kg/m²     GENERAL APPEARANCE:  The patient is a well-developed, well-nourished  female.    PELVIC:    External genitalia:  Normal Bartholins, Skenes and labia bilaterally.    Urethra:  No caruncle, diverticulum or masses.  (+) hypermobility.    Vagina:  Atrophy (+) , no bladder masses or tender, no discharge.    Cervix:  normal appearance  Uterus: normal  Adnexa: Not palpable.    RECTAL:    Deferred    The patient was fit with #5 ring w/ support pessary.  She was able to tolerate the device comfortabley with bending, squatting, valsalva.  She was not able to demonstrate independent removal and placement.  She tolerated the procedure well.        Impression    No diagnosis found.      1) Stage 3 pelvic organ prolapse, leading edge anterior wall   --Fit w/ #5 ring w/ support pessary    2)  Urinary urgency//Mixed urinary incontinence, urge > = < stress:    --Stop fluids 2 hours prior to bedtime   --Empty bladder every 2-3 hours even if you don't have the urge.  Empty well: wait a minute, lean forward on toilet.    --Try not to go more frequently than once an hour. When you get the urge to urinate after you have just gone, distract yourself until the urge passes.   --Avoid dietary irritants (see below).    --KEGELS: do 10 in AM and 10 in PM, holding each x 10 seconds.  When you feel urge to go, STOP, KEGEL, and when urge " has passed, then go to bathroom.    --Keep appointment for Pelvic Floor Physical Therapy (PT).   --URGE LEAKAGE: consider anticholinergic.   Takes 2-4 weeks to see if will have effect.  For dry mouth: use Biotene mouthwash, get sour, sugar free lozenge or gum.  May increase constipation.   --STRESS LEAKAGE (leak with laugh/cough/sneeze):  Pessary vs. Mid-urethral sling surgery vs Impressa.   -- PVR (post void residual): 150 mL w/ pessary in place   --A1c 5.8 (04/2024)    3) GSM  --Use 0.5-1 grams of estrogen cream in vagina with applicator or dime-sized amount with finger (as far as can reach internally) nightly x 2 weeks, then twice a week thereafter.  You can also apply a dime-sized amount with your finger around the vaginal opening and inner lips at same frequency.     4) PMB  --Pelvic ultrasound ordered for further evaluation  --Will remove pessary prior to ultrasound appointment     RTC for pelvic ultrasound follow-up    Face to Face time with patient: 20    25 minutes of total time spent on the encounter, which includes face to face time and non-face to face time preparing to see the patient (eg, review of tests), Obtaining and/or reviewing separately obtained history, Documenting clinical information in the electronic or other health record, Independently interpreting results (not separately reported) and communicating results to the patient/family/caregiver, or Care coordination (not separately reported).       Isabel Tello  Female Pelvic Medicine and Reconstructive Surgery  [facility] [place], [place]             [1]   Social History  Tobacco Use   Smoking Status Never    Passive exposure: Never   Smokeless Tobacco Never   Tobacco Comments    Pt dont smoke   [2]   Current Outpatient Medications on File Prior to Visit   Medication Sig Dispense Refill    ergocalciferol, vitamin D2, (VITAMIN D ORAL) Take by mouth.      estradioL (ESTRACE) 0.01 % (0.1 mg/gram) vaginal cream Use 0.5-1 grams  vaginally every night for 2 weeks, then twice weekly 42.5 g 3    cetirizine (ZYRTEC) 10 MG tablet Take 1 tablet (10 mg total) by mouth daily as needed for Allergies or Rhinitis. 90 tablet 3     No current facility-administered medications on file prior to visit.

## 2025-05-07 ENCOUNTER — PATIENT MESSAGE (OUTPATIENT)
Dept: OBSTETRICS AND GYNECOLOGY | Facility: CLINIC | Age: 65
End: 2025-05-07
Payer: MEDICARE

## 2025-05-07 NOTE — TELEPHONE ENCOUNTER
Call to pt. Discussed pelvic u/s results. ET 3 mm. Discussed recommendation for EMB despite normal ET. Agreeable to plan. Will attempt EMB after her appt with Dr. Solitario tomorrow.     Results for orders placed during the hospital encounter of 05/01/25    US Pelvis Comp with Transvag NON-OB (xpd    Impression  Endometrial thickness estimated at 0.3 cm, within normal range for age.  Further evaluation as warranted clinically.      Electronically signed by: Caroline Queen  Date:    05/02/2025  Time:    08:53

## 2025-05-08 ENCOUNTER — OFFICE VISIT (OUTPATIENT)
Dept: UROGYNECOLOGY | Facility: CLINIC | Age: 65
End: 2025-05-08
Payer: MEDICARE

## 2025-05-08 ENCOUNTER — PROCEDURE VISIT (OUTPATIENT)
Dept: UROGYNECOLOGY | Facility: CLINIC | Age: 65
End: 2025-05-08
Payer: MEDICARE

## 2025-05-08 VITALS
SYSTOLIC BLOOD PRESSURE: 178 MMHG | HEART RATE: 89 BPM | HEIGHT: 64 IN | WEIGHT: 165.81 LBS | BODY MASS INDEX: 28.31 KG/M2 | DIASTOLIC BLOOD PRESSURE: 86 MMHG

## 2025-05-08 DIAGNOSIS — N95.0 PMB (POSTMENOPAUSAL BLEEDING): Primary | ICD-10-CM

## 2025-05-08 DIAGNOSIS — N81.11 CYSTOCELE, MIDLINE: ICD-10-CM

## 2025-05-08 DIAGNOSIS — R39.15 URINARY URGENCY: Primary | ICD-10-CM

## 2025-05-08 DIAGNOSIS — R35.0 URINARY FREQUENCY: ICD-10-CM

## 2025-05-08 DIAGNOSIS — N81.2 INCOMPLETE UTEROVAGINAL PROLAPSE: ICD-10-CM

## 2025-05-08 LAB — POC RESIDUAL URINE VOLUME: 61 ML (ref 0–100)

## 2025-05-08 PROCEDURE — 3079F DIAST BP 80-89 MM HG: CPT | Mod: CPTII,HCNC,S$GLB, | Performed by: OBSTETRICS & GYNECOLOGY

## 2025-05-08 PROCEDURE — 3288F FALL RISK ASSESSMENT DOCD: CPT | Mod: CPTII,HCNC,S$GLB, | Performed by: OBSTETRICS & GYNECOLOGY

## 2025-05-08 PROCEDURE — 1159F MED LIST DOCD IN RCRD: CPT | Mod: CPTII,HCNC,S$GLB, | Performed by: OBSTETRICS & GYNECOLOGY

## 2025-05-08 PROCEDURE — 3077F SYST BP >= 140 MM HG: CPT | Mod: CPTII,HCNC,S$GLB, | Performed by: OBSTETRICS & GYNECOLOGY

## 2025-05-08 PROCEDURE — G2211 COMPLEX E/M VISIT ADD ON: HCPCS | Mod: HCNC,S$GLB,, | Performed by: OBSTETRICS & GYNECOLOGY

## 2025-05-08 PROCEDURE — 1101F PT FALLS ASSESS-DOCD LE1/YR: CPT | Mod: CPTII,HCNC,S$GLB, | Performed by: OBSTETRICS & GYNECOLOGY

## 2025-05-08 PROCEDURE — 99499 UNLISTED E&M SERVICE: CPT | Mod: HCNC,S$GLB,, | Performed by: NURSE PRACTITIONER

## 2025-05-08 PROCEDURE — 99215 OFFICE O/P EST HI 40 MIN: CPT | Mod: HCNC,S$GLB,, | Performed by: OBSTETRICS & GYNECOLOGY

## 2025-05-08 PROCEDURE — 1126F AMNT PAIN NOTED NONE PRSNT: CPT | Mod: CPTII,HCNC,S$GLB, | Performed by: OBSTETRICS & GYNECOLOGY

## 2025-05-08 PROCEDURE — 3008F BODY MASS INDEX DOCD: CPT | Mod: CPTII,HCNC,S$GLB, | Performed by: OBSTETRICS & GYNECOLOGY

## 2025-05-08 PROCEDURE — 58100 BIOPSY OF UTERUS LINING: CPT | Mod: HCNC,S$GLB,, | Performed by: NURSE PRACTITIONER

## 2025-05-08 PROCEDURE — 99999 PR PBB SHADOW E&M-EST. PATIENT-LVL III: CPT | Mod: PBBFAC,HCNC,, | Performed by: OBSTETRICS & GYNECOLOGY

## 2025-05-08 PROCEDURE — 1160F RVW MEDS BY RX/DR IN RCRD: CPT | Mod: CPTII,HCNC,S$GLB, | Performed by: OBSTETRICS & GYNECOLOGY

## 2025-05-08 PROCEDURE — 51798 US URINE CAPACITY MEASURE: CPT | Mod: HCNC,S$GLB,, | Performed by: OBSTETRICS & GYNECOLOGY

## 2025-05-08 NOTE — PROCEDURES
Chief Complaint: EMB     Subjective:      Omid Rascon is a 65 y.o.  who presents today for endometrial biopsy.  No LMP recorded. Patient has had an ablation.    The risks and benefits of endometrial biopsy were reviewed.  All of Ms. Rascon's questions were answered. She voiced her understanding and agreed to proceed with endometrial biopsy.      Objective:        There were no vitals filed for this visit.  GENERAL: Well nourished, well developed, in no acute distress.    Biopsy (Gynecological)    Date/Time: 2025 2:30 PM    Performed by: Isabel Tello NP  Authorized by: Isabel Tello NP    Consent obtained:  Prior to procedure the appropriate consent was completed and verified   Patient was prepped and draped in the normal sterile fashion.  Local anesthesia used?: No      Biopsy Location:  Uterus  Estimated blood loss (cc):  0     Cervix cleaned x 3 with hibiclens  Tenaculum applied to anterior aspect of cervix  Endometrial pipelle inserted to 8 cm   1 scant sample collected; pt unable to tolerate a second pass   Hemostasis noted after removing tenaculum      Assessment/Plan:     PMB (postmenopausal bleeding)  -     Biopsy (Gynecological)  -     Specimen to Pathology Gynecology and Obstetrics      Final plan and follow up to be based on biopsy results.    The importance of keeping follow-up appointments was discussed.For cramping NSAIDs or Tylenol were recommended.  Advised that she may resume normal activities, including tampon use and intercourse, as soon as she feels ready.   Informed that spotting to mild bleeding is to be expected following endometrial biopsy.  Instructed to call the office for heavy bleeding, significant pain, or a  foul-smelling vaginal discharge.

## 2025-05-08 NOTE — PATIENT INSTRUCTIONS
PESSARY CARE: Remove pessary as frequently as nightly and as infrequently as every 2-3 weeks. Remove before intercourse. May need to remove before bowel movements if straining dislodges. Wash with soap and water (no ). Replace using small amount of water-based lubricant (like KY jelly) at end being introduced into vagina.     Inserting the pessary  Wash your hands.  The notches inside the open ring and the openings in the ring-with-support are the flexible points. Grasp the device midway between these points and fold the pessary in half. The curved part should be facing the ceiling, like a taco. Put a small amount of water-soluble lubricant, such as KY Jelly, on the insertion edge.   Hold the folded pessary in one hand and spread the lips of your vagina with the other hand. Gently push the pessary as far back into the vagina as it will go. You can do this squatting, standing with one foot propped on the tub or toilet, or sitting with your feet propped up.    Removing the pessary  Wash your hands.  Find the rim of the pessary just under the pubic bone at the front of your vagina. Locate the notch or opening and hook your finger under or over the rim.  Tilt the pessary slightly, to about a 30 degree angle, and gently pull down and out of the vagina. If you can fold the pessary somewhat, it will ease the removal.  Bearing down as if you are having a bowel movement can help push the rim of the pessary forward so you can grasp it more easily.\

## 2025-05-08 NOTE — PROGRESS NOTES
Chief Complaint   Patient presents with    Surgical Consult    Urinary Urgency    Urinary Incontinence        HPI: Patient is a 65 y.o. female  who presents today for consultation for prolapse. Referred by Isabel Tello NP.   Patient reports that she is interested in knwoing surgical options for vaginal prolapse. States that she was scheduled for surgery in  with Dr. Cabrales. Was informed by Dr. Cabrales that her surgery could not be performed at [facility] [place] and would have to move the case to [place]. The surgery did not happen and she states that she she was seen by another gyn who referred her again to the practice.      Patient states that she fell off of a ladder and after the fall she had severe pain and following this she noticed a vaginal protrusion. This occurred in 2022. The bulge is about the size of a golf ball. Saw Isabel Tello who fit her with a pessary, #5 ring with support. Also started PFPT.    States that she is not urinating as often with the pessary and since starting PT.   She is voiding every 3-4 hours when the pessary is in place. With her pessary out her urinary frequency returns, voiding every hour. The prolapse is also out. Wakes to void every 1.5 hours when her pessary is out. When her pessary is in place she can sleep at least every 5 hours.   She denies loss of urine with a cough, sneeze or laugh. Denies urinary urgency and urgency urinary incontinence when the pessary is in place.     She denies constipation.      Review of Systems   Constitutional:  Negative for activity change, appetite change, chills, fatigue, fever and unexpected weight change.   HENT:  Negative for nasal congestion, dental problem, hearing loss, mouth dryness and sore throat.    Eyes:  Negative for pain and eye dryness.   Respiratory:  Negative for cough, shortness of breath and wheezing.    Cardiovascular:  Negative for chest pain, palpitations and leg swelling.   Gastrointestinal:  Negative for  abdominal distention, abdominal pain, blood in stool, constipation and rectal pain.   Endocrine: Negative for cold intolerance and heat intolerance.   Genitourinary:  Negative for dyspareunia, hot flashes and vaginal dryness.   Musculoskeletal:  Negative for arthralgias, back pain, gait problem, joint swelling, myalgias, neck pain and neck stiffness.   Integumentary:  Negative for rash.   Neurological:  Negative for dizziness, tremors, seizures, speech difficulty, weakness, light-headedness, numbness and headaches.   Psychiatric/Behavioral:  Negative for confusion, dysphoric mood and sleep disturbance. The patient is not nervous/anxious.         Past Medical History:   Diagnosis Date    Anemia     CKD (chronic kidney disease), stage II 05/16/2016    Depression     Hemoglobin C trait     History of blood transfusion     Hypertension     no meds    Impaired hearing     Lumbago 08/10/2015    Monoclonal gammopathy 11/10/2016    Nuclear sclerosis of both eyes 10/07/2019    Tortuous aorta 03/18/2016       Past Surgical History:   Procedure Laterality Date    ABLATION  2008    COLONOSCOPY N/A 09/14/2022    Procedure: COLONOSCOPY;  Surgeon: Amarilis Chacon MD;  Location: [facility];  Service: Endoscopy;  Laterality: N/A;  positive FIT.Fully vaccinated EC    COLONOSCOPY, SCREENING, HIGH RISK PATIENT N/A 10/8/2024    Procedure: COLONOSCOPY, SCREENING, HIGH RISK PATIENT;  Surgeon: Amarilis Chacon MD;  Location: [facility];  Service: Endoscopy;  Laterality: N/A;  previous procedure done by Dr. Chacon  polyps noted on procedure report 9/14/2022 9/9 ref by Jaqueline Christensen, MELECIO, PEG, instr. to portal-  10/1/24- [facility]/portal for pc-- pt returned call, pc complete. DBM    HYSTERECTOMY  3/2008    Partial Hysterectomy, ablation of uterus    LASER ABLATION OF THE CERVIX         Current Medications[1]    Review of patient's allergies indicates:   Allergen Reactions    Lactose     Sulfa (sulfonamide antibiotics) Swelling    Latex,  natural rubber Rash    Shellfish containing products Rash    Strawberries [strawberry] Rash       Family History   Problem Relation Name Age of Onset    Stroke Father Jimpy     Heart failure Mother      No Known Problems Sister      No Known Problems Brother      No Known Problems Brother      No Known Problems Brother      No Known Problems Sister      No Known Problems Sister      No Known Problems Sister      No Known Problems Brother      No Known Problems Sister      Diabetes Sister Oldest     No Known Problems Maternal Aunt      No Known Problems Maternal Uncle      No Known Problems Paternal Aunt      No Known Problems Paternal Uncle      No Known Problems Maternal Grandmother      No Known Problems Maternal Grandfather      No Known Problems Paternal Grandmother      No Known Problems Paternal Grandfather      Amblyopia Neg Hx      Blindness Neg Hx      Cancer Neg Hx      Cataracts Neg Hx      Glaucoma Neg Hx      Hypertension Neg Hx      Macular degeneration Neg Hx      Retinal detachment Neg Hx      Strabismus Neg Hx      Thyroid disease Neg Hx      Breast cancer Neg Hx      Colon cancer Neg Hx      Ovarian cancer Neg Hx         Social History     Socioeconomic History    Marital status: Other   Tobacco Use    Smoking status: Never     Passive exposure: Never    Smokeless tobacco: Never    Tobacco comments:     Pt dont smoke   Substance and Sexual Activity    Alcohol use: No    Drug use: No    Sexual activity: Not Currently     Partners: Male     Birth control/protection: Abstinence, See Surgical Hx, None   Social History Narrative    Lives with spouse.      Social Drivers of Health     Financial Resource Strain: Medium Risk (2/18/2025)    Overall Financial Resource Strain (CARDIA)     Difficulty of Paying Living Expenses: Somewhat hard   Food Insecurity: Food Insecurity Present (2/18/2025)    Hunger Vital Sign     Worried About Running Out of Food in the Last Year: Sometimes true     Ran Out of Food in  the Last Year: Sometimes true   Transportation Needs: No Transportation Needs (2025)    PRAPARE - Transportation     Lack of Transportation (Medical): No     Lack of Transportation (Non-Medical): No   Physical Activity: Sufficiently Active (2025)    Exercise Vital Sign     Days of Exercise per Week: 5 days     Minutes of Exercise per Session: 30 min   Stress: Stress Concern Present (2025)    [employer] - Occupational Stress Questionnaire     Feeling of Stress : Very much   Housing Stability: Low Risk  (2025)    Housing Stability Vital Sign     Unable to Pay for Housing in the Last Year: No     Number of Times Moved in the Last Year: 0     Homeless in the Last Year: No       OB History          4    Para   2    Term   2            AB   2    Living   2         SAB   2    IAB        Ectopic        Multiple        Live Births                     Gyn History    Mammogram: 9/3/24 BIRADS 1b negative, scattered fibbroglandular density    Pap smear: 23 negative, negative HPV  LMP: No LMP recorded. Patient has had an ablation.   Postmenopausal bleeding: yes  PV Ultrasound from 4/3/25: Uterus:     Size: 5.5 x 3.2 x 5.1 cm     The parenchyma is mildly heterogeneous with no discrete lesion.  Endometrium is normal caliber measuring 0.3 cm. Right ovary: Size: 2.2 x 1.1 x 2.3 cm. Left ovary: Not visualized.  No suspicious adnexal mass. Free Fluid: None.         INITIAL PHYSICAL EXAMINATION    Vitals:    25 1439   BP: (!) 178/86   Pulse: 89      General: Healthy in appearance, Well nourished, Affect Normal, NAD.  Neck: Supple, No masses, Trachea midline, Thyroid normal size,  non-tender, no masses or nodules.  Nodes: No clavicular/cervical adenopathy.  Skin: Normal temperature, No atypical lesions or rash.  Heart: Normal sounds, no murmurs  Lungs: Normal respiratory effort.  Gastrointestinal: Non tender, Non distended, No masses, guarding or  rebound, No  hepatosplenomegally, No hernia.  Ext: No clubbing, cyanosis, edema or varicosities.  DTR's: 2+ bilaterally  Strength 5/5 bilateral upper and lower extremities    Genitourinary- (Verbal consent obtained; Female chaperone present during the pelvic exam)    Vulva: normal without lesions, masses, atrophy or pain  Urethra: meatus central and normal in appearance, no prolapse/caruncle, no masses or discharge. Empty supine stress test was negative.  Bladder: non-tender, no masses  Vagina: No discharge or lesions, moderate atrophy, no masses appreciated.  [See POP-Q]  Cervix: no lesions, no discharge  Uterus:  non-tender, anteverted, approximately 5 weeks size  Adnexa: no masses, non tender.  Rectal: deferred   Neuro: S2-4- pin prick and light touch intact, Anal wink present  Levator strength: 3/5  Levator tenderness: left 0/10 and right 0/10    POP-Q Exam- pelvic organ prolapse quantitative    Aa +3  Anterior Wall Ba +3  Anterior wall C +1  Cervix or cuff   Gh 5  Genital hiatus pb 3  perineal body tvl 9.5  Total vaginal length   Ap -2  Posterior wall Bp -2  Posterior wall D -3  Posterior fornix     with Uterus    POP-Q Stage:3      Office Visit on 05/08/2025   Component Date Value Ref Range Status    POC Residual Urine Volume 05/08/2025 61  0 - 100 mL Final   Procedure visit on 05/08/2025   Component Date Value Ref Range Status    Case Report 05/08/2025    Final                    Value:[facility]                           Case: [id number]                                Authorizing Provider:  Isabel Tello NP        Collected:           05/08/2025 04:22 PM          Ordering Location:     [facility] - Uro[facility]necology    Received:            05/08/2025 04:23 PM          Pathologist:           Red Manuel MD                                                         Specimen:    Endometrium, EMBX                                                                          Clinical Information 05/08/2025    Final  "                   Value:post menopausal bleeding    Final Diagnosis 05/08/2025    Final                    Value:Small fragments of inactive endometrium, no atypical hyperplasia or malignancy identified.      Gross Description 05/08/2025    Final                    Value:1. Endometrium: EMBX  MRN # on Epic Label:  [id number]  MRN # on Pathology Label:  [id number]    The specimen is received in formalin labeled "EMB".  The specimen consists of multiple tan fragments of soft tissue measuring 1.3 x 0.4 x 0.1 cm in aggregate.  The specimen is submitted entirely in cassette 1A.    Grossing was completed by Adolfo Guardado on 5/9/2025.        Report Footnotes 05/08/2025    Final                    Value:Unless the case is a "gross only" or additional testing only, the final diagnosis for each specimen is based on a microscopic examination of appropriate tissue sections.      Performing Location 05/08/2025    Final                    Value:Grossing performed at [facility], [address] [place], [address]    Sign out performed at [facility], [address] [place] [id number]        Dx Category 05/08/2025 Benign    Final        ASSESSMENT & PLAN:    Urinary urgency  -     POCT Bladder Scan    Urinary frequency    Incomplete uterovaginal prolapse    Cystocele, midline       Exam findings and treatment options were discussed in detail with the patient. Her symptoms and exam findings are consistent with stage 3 pelvic organ prolapse. We discussed various surgical and nonsurgical management options including pessary use, pelvic floor rehabilitation, and reconstructive pelvic surgery. At this time the patient states she would like to try the pessary for 6 months. She has been happy with it thus far. She was shown how to insert and remove the pessary.   She is undergoing an EMB today due to c/o PMB.   Reviewed that if she proceeds with surgery we would likely " perform a TLH and SCP.  [facility]GA handouts on pessary and SCP were provided to the patient.       All questions were answered today. The patient was encouraged to contact the office as needed with any additional questions or concerns.     Total time spent on visit was 40 minutes.  This includes face to face time and non-face to face time preparing to see the patient (eg, review of tests), Obtaining and/or reviewing separately obtained history, Documenting clinical information in the electronic or other health record, Independently interpreting resultsand communicating results to the patient/family/caregiver, or Care coordination.    Angella Solitario MD             [1]   Current Outpatient Medications:     ergocalciferol, vitamin D2, (VITAMIN D ORAL), Take by mouth., Disp: , Rfl:     estradioL (ESTRACE) 0.01 % (0.1 mg/gram) vaginal cream, Use 0.5-1 grams vaginally every night for 2 weeks, then twice weekly, Disp: 42.5 g, Rfl: 3    cetirizine (ZYRTEC) 10 MG tablet, Take 1 tablet (10 mg total) by mouth daily as needed for Allergies or Rhinitis., Disp: 90 tablet, Rfl: 3

## 2025-05-12 ENCOUNTER — RESULTS FOLLOW-UP (OUTPATIENT)
Dept: OBSTETRICS AND GYNECOLOGY | Facility: CLINIC | Age: 65
End: 2025-05-12

## 2025-05-12 ENCOUNTER — TELEPHONE (OUTPATIENT)
Dept: INTERNAL MEDICINE | Facility: CLINIC | Age: 65
End: 2025-05-12
Payer: MEDICARE

## 2025-05-12 NOTE — TELEPHONE ENCOUNTER
LVM for pt to reschedule her upcoming appointment. Dr. Hendrickson will not be in the office on 5/14.

## 2025-05-13 ENCOUNTER — CLINICAL SUPPORT (OUTPATIENT)
Dept: REHABILITATION | Facility: OTHER | Age: 65
End: 2025-05-13
Payer: MEDICARE

## 2025-05-13 DIAGNOSIS — N81.89 PELVIC FLOOR WEAKNESS: ICD-10-CM

## 2025-05-13 DIAGNOSIS — M62.81 WEAKNESS OF TRUNK MUSCULATURE: Primary | ICD-10-CM

## 2025-05-13 PROCEDURE — 97112 NEUROMUSCULAR REEDUCATION: CPT | Mod: HCNC

## 2025-05-14 ENCOUNTER — TELEPHONE (OUTPATIENT)
Dept: OBSTETRICS AND GYNECOLOGY | Facility: CLINIC | Age: 65
End: 2025-05-14
Payer: MEDICARE

## 2025-05-14 NOTE — PROGRESS NOTES
Outpatient Rehab    Physical Therapy Visit    Patient Name: Omid Rascon  MRN: [id number]  YOB: 1960  Encounter Date: 5/13/2025    Therapy Diagnosis:   Encounter Diagnoses   Name Primary?    Weakness of trunk musculature Yes    Pelvic floor weakness      Physician: Chuy Mishra III, MD    Physician Orders: Eval and Treat  Medical Diagnosis: Other female genital prolapse    Visit # / Visits Authorized:  1 / 20  Insurance Authorization Period: 4/9/2025 to 7/13/2025  Date of Evaluation: 4/9/2025  Plan of Care Certification: 4/9/2025 to 7/9/2025      PT/PTA:     Number of PTA visits since last PT visit:   Time In: 1520   Time Out: 1600  Total Time (in minutes): 40   Total Billable Time (in minutes): 40    FOTO:  Intake Score:  %  Survey Score 2:  %  Survey Score 3:  %    Precautions:       Subjective   Exercises have been going well. Plan to use these + pessary for 6 months before re-evaluating with Dr. Solitario..         Objective            Treatment:  Balance/Neuromuscular Re-Education  NMR 1: coordinated breathing  + kegel - focus on decreased breath holding  NMR 2: kegel + adductor set - supine, sitting  NMR 3: kegel + TA set - supine, sitting  NMR 4: kegel + bridge  NMR 5: kegel + sit <> stand    Time Entry(in minutes):  Neuromuscular Re-Education Time Entry: 40    Assessment & Plan   Assessment: Strength, endurance and coordination training progressed this session to include co-activation of glutes, abdominals, and adductors. Most difficulty noted with TA recruitment and isolation for rectus abdominus and obliques. Verbal, tactile cueing and demonstration utilized, though performance remained inconsistent. Additionally significant cueing required for decreased breathing holding. Based on performance, plan to review NV prior to progressing.  Evaluation/Treatment Tolerance: Patient tolerated treatment well    Patient will continue to benefit from skilled outpatient physical therapy to address  the deficits listed in the problem list box on initial evaluation, provide pt/family education and to maximize pt's level of independence in the home and community environment.     Patient's spiritual, cultural, and educational needs considered and patient agreeable to plan of care and goals.           Plan: review overflow - consider + TA series    Goals:   Active       Long Term Goals       Pt to report elimination of incontinence with ADLs to demonstrate improved pelvic floor muscle strength and coordination   (Progressing)       Start:  04/09/25    Expected End:  07/02/25            Pt to report a decrease in pelvic pressure and fullness to no more than 20% of the time to demonstrate improving pelvic support of pelvic structures.  (Progressing)       Start:  04/09/25    Expected End:  07/02/25            Pt to increase pelvic floor strength to at least 4/5 to demonstrate improved strength needed for continence with ADLs.   (Progressing)       Start:  04/09/25    Expected End:  07/02/25               Short Term Goals       Pt to be able to perform a 5  second kegel x 10 reps to demonstrate improving strength and endurance needed for continence.  (Progressing)       Start:  04/09/25    Expected End:  06/04/25             Pt to report being able to sneeze without incontinence demonstrating improved pelvic floor strength and coordination to improve confidence in social situations   (Progressing)       Start:  04/09/25    Expected End:  06/04/25             Pt to report a decrease in pelvic pressure and fullness to no more than 20% of the time to demonstrate improving pelvic support of pelvic structures. (Progressing)       Start:  04/09/25    Expected End:  06/04/25                RISHABH CHAUDHRY, PT

## 2025-05-21 ENCOUNTER — CLINICAL SUPPORT (OUTPATIENT)
Dept: REHABILITATION | Facility: OTHER | Age: 65
End: 2025-05-21
Payer: MEDICARE

## 2025-05-21 DIAGNOSIS — N81.89 PELVIC FLOOR WEAKNESS: Primary | ICD-10-CM

## 2025-05-21 PROCEDURE — 97112 NEUROMUSCULAR REEDUCATION: CPT | Mod: HCNC

## 2025-05-22 NOTE — PROGRESS NOTES
Outpatient Rehab    Physical Therapy Visit    Patient Name: Omid Rascon  MRN: [id number]  YOB: 1960  Encounter Date: 5/21/2025    Therapy Diagnosis:   Encounter Diagnosis   Name Primary?    Pelvic floor weakness Yes     Physician: Chuy Mishra III, MD    Physician Orders: Eval and Treat  Medical Diagnosis: Other female genital prolapse    Visit # / Visits Authorized:  2 / 20  Insurance Authorization Period: 4/9/2025 to 7/13/2025  Date of Evaluation: 4/9/2025  Plan of Care Certification: 4/9/2025 to 7/9/2025      PT/PTA:     Number of PTA visits since last PT visit:   Time In: 1515   Time Out: 1600  Total Time (in minutes): 45   Total Billable Time (in minutes): 45    FOTO:  Intake Score:  %  Survey Score 2:  %  Survey Score 3:  %    Precautions:       Subjective   Has not used exercises completely due to limited privacy in home..         Objective            Treatment:  Balance/Neuromuscular Re-Education  NMR 1: coordinated breathing  + kegel - focus on decreased breath holding  NMR 2: kegel + adductor set - supine, sitting  NMR 3: kegel + TA set - supine, sitting  NMR 4: kegel + bridge  NMR 5: kegel + sit <> stand    Time Entry(in minutes):  Neuromuscular Re-Education Time Entry: 45    Assessment & Plan   Assessment: Based on poor use of HEP, significant review performed today. Omid continues to require significant tactile and verbal cueing as well as demonstration for proper performance. Based on performance, plan to review prior to progressing NV.  Evaluation/Treatment Tolerance: Patient tolerated treatment well    Patient will continue to benefit from skilled outpatient physical therapy to address the deficits listed in the problem list box on initial evaluation, provide pt/family education and to maximize pt's level of independence in the home and community environment.     Patient's spiritual, cultural, and educational needs considered and patient agreeable to plan of care and  goals.           Plan: review overflow - consider + TA series for improved pressure management    Goals:   Active       Long Term Goals       Pt to report elimination of incontinence with ADLs to demonstrate improved pelvic floor muscle strength and coordination   (Progressing)       Start:  04/09/25    Expected End:  07/02/25            Pt to report a decrease in pelvic pressure and fullness to no more than 20% of the time to demonstrate improving pelvic support of pelvic structures.  (Progressing)       Start:  04/09/25    Expected End:  07/02/25            Pt to increase pelvic floor strength to at least 4/5 to demonstrate improved strength needed for continence with ADLs.   (Progressing)       Start:  04/09/25    Expected End:  07/02/25               Short Term Goals       Pt to be able to perform a 5  second kegel x 10 reps to demonstrate improving strength and endurance needed for continence.  (Progressing)       Start:  04/09/25    Expected End:  06/04/25             Pt to report being able to sneeze without incontinence demonstrating improved pelvic floor strength and coordination to improve confidence in social situations   (Progressing)       Start:  04/09/25    Expected End:  06/04/25             Pt to report a decrease in pelvic pressure and fullness to no more than 20% of the time to demonstrate improving pelvic support of pelvic structures. (Progressing)       Start:  04/09/25    Expected End:  06/04/25                RISHABH CHAUDHRY, PT

## 2025-05-28 ENCOUNTER — CLINICAL SUPPORT (OUTPATIENT)
Dept: REHABILITATION | Facility: OTHER | Age: 65
End: 2025-05-28
Payer: MEDICARE

## 2025-05-28 ENCOUNTER — OFFICE VISIT (OUTPATIENT)
Dept: INTERNAL MEDICINE | Facility: CLINIC | Age: 65
End: 2025-05-28
Payer: MEDICARE

## 2025-05-28 VITALS
HEIGHT: 64 IN | SYSTOLIC BLOOD PRESSURE: 196 MMHG | BODY MASS INDEX: 28.38 KG/M2 | OXYGEN SATURATION: 99 % | DIASTOLIC BLOOD PRESSURE: 110 MMHG | WEIGHT: 166.25 LBS | HEART RATE: 88 BPM

## 2025-05-28 DIAGNOSIS — N81.2 INCOMPLETE UTEROVAGINAL PROLAPSE: ICD-10-CM

## 2025-05-28 DIAGNOSIS — N18.31 CHRONIC KIDNEY DISEASE, STAGE 3A: ICD-10-CM

## 2025-05-28 DIAGNOSIS — N81.89 PELVIC FLOOR WEAKNESS: Primary | ICD-10-CM

## 2025-05-28 DIAGNOSIS — Z00.00 ROUTINE MEDICAL EXAM: Primary | ICD-10-CM

## 2025-05-28 DIAGNOSIS — N25.81 SECONDARY HYPERPARATHYROIDISM: Chronic | ICD-10-CM

## 2025-05-28 DIAGNOSIS — R73.03 PREDIABETES: Chronic | ICD-10-CM

## 2025-05-28 DIAGNOSIS — I10 WHITE COAT SYNDROME WITH DIAGNOSIS OF HYPERTENSION: Chronic | ICD-10-CM

## 2025-05-28 DIAGNOSIS — I77.1 TORTUOUS AORTA: Chronic | ICD-10-CM

## 2025-05-28 PROCEDURE — 99999 PR PBB SHADOW E&M-EST. PATIENT-LVL IV: CPT | Mod: PBBFAC,,, | Performed by: INTERNAL MEDICINE

## 2025-05-28 PROCEDURE — 99214 OFFICE O/P EST MOD 30 MIN: CPT | Mod: 25,S$GLB,, | Performed by: INTERNAL MEDICINE

## 2025-05-28 PROCEDURE — 1159F MED LIST DOCD IN RCRD: CPT | Mod: CPTII,S$GLB,, | Performed by: INTERNAL MEDICINE

## 2025-05-28 PROCEDURE — 97112 NEUROMUSCULAR REEDUCATION: CPT

## 2025-05-28 PROCEDURE — 1160F RVW MEDS BY RX/DR IN RCRD: CPT | Mod: CPTII,S$GLB,, | Performed by: INTERNAL MEDICINE

## 2025-05-28 PROCEDURE — 3288F FALL RISK ASSESSMENT DOCD: CPT | Mod: CPTII,S$GLB,, | Performed by: INTERNAL MEDICINE

## 2025-05-28 PROCEDURE — 99397 PER PM REEVAL EST PAT 65+ YR: CPT | Mod: S$GLB,,, | Performed by: INTERNAL MEDICINE

## 2025-05-28 PROCEDURE — 3008F BODY MASS INDEX DOCD: CPT | Mod: CPTII,S$GLB,, | Performed by: INTERNAL MEDICINE

## 2025-05-28 PROCEDURE — 1126F AMNT PAIN NOTED NONE PRSNT: CPT | Mod: CPTII,S$GLB,, | Performed by: INTERNAL MEDICINE

## 2025-05-28 PROCEDURE — 1101F PT FALLS ASSESS-DOCD LE1/YR: CPT | Mod: CPTII,S$GLB,, | Performed by: INTERNAL MEDICINE

## 2025-05-28 PROCEDURE — 3080F DIAST BP >= 90 MM HG: CPT | Mod: CPTII,S$GLB,, | Performed by: INTERNAL MEDICINE

## 2025-05-28 PROCEDURE — 4010F ACE/ARB THERAPY RXD/TAKEN: CPT | Mod: CPTII,S$GLB,, | Performed by: INTERNAL MEDICINE

## 2025-05-28 PROCEDURE — 3077F SYST BP >= 140 MM HG: CPT | Mod: CPTII,S$GLB,, | Performed by: INTERNAL MEDICINE

## 2025-05-28 RX ORDER — FOSINOPIRL SODIUM 10 MG/1
TABLET ORAL
Qty: 90 TABLET | Refills: 0 | Status: SHIPPED | OUTPATIENT
Start: 2025-05-28 | End: 2025-07-27

## 2025-05-28 NOTE — PROGRESS NOTES
Outpatient Rehab    Physical Therapy Visit    Patient Name: Omid Rascon  MRN: [id number]  YOB: 1960  Encounter Date: 5/28/2025    Therapy Diagnosis:   Encounter Diagnosis   Name Primary?    Pelvic floor weakness Yes     Physician: Chuy Mishra III, MD    Physician Orders: Eval and Treat  Medical Diagnosis: Other female genital prolapse    Visit # / Visits Authorized:  3 / 20  Insurance Authorization Period: 4/9/2025 to 7/13/2025  Date of Evaluation: 4/9/2025  Plan of Care Certification: 4/9/2025 to 7/9/2025      PT/PTA:     Number of PTA visits since last PT visit:   Time In: 1520   Time Out: 1600  Total Time (in minutes): 40   Total Billable Time (in minutes): 40    FOTO:  Intake Score:  %  Survey Score 2:  %  Survey Score 3:  %    Precautions:       Subjective   Feels better able to hold urine.         Objective            Treatment:  Balance/Neuromuscular Re-Education  NMR 1: kegel + step up  NMR 2: kegel + squat tap  NMR 3: kegel + deadlift  NMR 4: kegel + asymmetrical lift    Time Entry(in minutes):  Neuromuscular Re-Education Time Entry: 40    Assessment & Plan   Assessment: Strengthening and coordination training progressed this session, with Omid demonstrating fairly good form. Poor posterior weight shifting noted with lifting, so chair added for butt tap. SIgnificant improvement in posterior weight shifting noted. Based on performance, plan to reassess PF NV.  Evaluation/Treatment Tolerance: Patient tolerated treatment well    Patient will continue to benefit from skilled outpatient physical therapy to address the deficits listed in the problem list box on initial evaluation, provide pt/family education and to maximize pt's level of independence in the home and community environment.     Patient's spiritual, cultural, and educational needs considered and patient agreeable to plan of care and goals.           Plan: reassess PF, + lifting training    Goals:   Active       Long Term  Goals       Pt to report elimination of incontinence with ADLs to demonstrate improved pelvic floor muscle strength and coordination   (Progressing)       Start:  04/09/25    Expected End:  07/02/25            Pt to report a decrease in pelvic pressure and fullness to no more than 20% of the time to demonstrate improving pelvic support of pelvic structures.  (Progressing)       Start:  04/09/25    Expected End:  07/02/25            Pt to increase pelvic floor strength to at least 4/5 to demonstrate improved strength needed for continence with ADLs.   (Progressing)       Start:  04/09/25    Expected End:  07/02/25               Short Term Goals       Pt to be able to perform a 5  second kegel x 10 reps to demonstrate improving strength and endurance needed for continence.  (Progressing)       Start:  04/09/25    Expected End:  06/04/25             Pt to report being able to sneeze without incontinence demonstrating improved pelvic floor strength and coordination to improve confidence in social situations   (Progressing)       Start:  04/09/25    Expected End:  06/04/25             Pt to report a decrease in pelvic pressure and fullness to no more than 20% of the time to demonstrate improving pelvic support of pelvic structures. (Progressing)       Start:  04/09/25    Expected End:  06/04/25                RISHABH CHAUDHRY, PT

## 2025-05-28 NOTE — ASSESSMENT & PLAN NOTE
I suspect her BP has been high for some time.  Will start a slow approach to getting her under control with fosinopril.  F/u in 2 months for further titration of meds.

## 2025-05-28 NOTE — ASSESSMENT & PLAN NOTE
Reports today that she did not have any issues with taking fosinopril in the past.  It was stopped due to cost when she was without insurance.  Restart fosinopril. Continue ACE-I/ARB, sodium restriction, and avoidance of nephrotoxic agents.

## 2025-05-28 NOTE — PATIENT INSTRUCTIONS
STEP UP + KEGEL  Start with one foot elevated on step/stool.  Inhale to prepare  Exhale as you kegel (tighten and lift the vagina), then lift back leg up to meet front foot on the step/stool.   Relax pelvic floor, then step back down to start position.         LIFT + KEGEL  Begin standing with weights in hand.   Lower butt down and back, as if you were lowering to sit in a chair.  Exhale as you kegel (tighten and lift the vagina), then hold has you stand back up.   Relax pelvic floor, then return to squat.     *You can place a chair behind you and tap your butt on the chair if you would like.         ONE-SIDED LIFT + KEGEL  Begin standing with weight in one hand.   Lower butt down and back, as if you were lowering to sit in a chair.  Exhale as you kegel (tighten and lift the vagina), then hold has you stand back up.   Relax pelvic floor, then return to squat.     *You can place a chair behind you and tap your butt on the chair if you would like.

## 2025-05-28 NOTE — PROGRESS NOTES
Assessment & Plan  Assessment & Plan    IMPRESSION:  - BP significantly elevated at home (180/120), indicating need for antihypertensive medication despite previous white coat HTN diagnosis.  - Noted low renal function, potentially related to previous Meloxicam use or persistent HTN.  - Chose Fosinopril for its potential kidney-protective effects in addition to BP control.    PLAN SUMMARY:  - [place] [facility] [place]  - Prescribed Fosinopril 1 pill daily for 1 month, then increase to 2 pills daily for another month  - Continue using pessary until November  - Arranged consultation with new surgeon for end of April or May  - Follow-up in 2 months for blood pressure evaluation    CHRONIC KIDNEY DISEASE, STAGE 3A:  - Monitored patient's persistently low kidney function since 2022, which was due for recheck in October.  - Discussed possible causes including prior Meloxicam use and hypertension.  - Prescribed Fosinopril to protect kidneys and manage blood pressure.  - Ordered fasting labs and urine test to be completed tomorrow morning at [facility].    HYPERTENSION:  - Monitored the patient's white coat hypertension, noting extremely high blood pressure readings at home (reaching 180/120) indicating poor control.  - Discussed the necessity to initiate medication, explaining that while garlic may have some antihypertensive effects, it is not sufficient as a sole treatment.  - Prescribed Fosinopril 1 pill daily for 1 month, then to increase to 2 pills daily for another month, using a gradual dose approach to minimize potential side effects.  - Scheduled follow up in 2 months for blood pressure evaluation.    FEMALE GENITAL PROLAPSE:  - Monitored the patient's vaginal prolapse, present since 2020 without surgical intervention.  - Ms. Rascon is experiencing pelvic pain associated with the prolapse.  - Arranged consultation with a new surgeon at the end of April or  May for evaluation.  - Advised to continue using pessary until November for management.    WEIGHT LOSS:  - Monitored the patient's weight loss of approximately 20 lbs, which was assessed as beneficial for overall health.    DRUG ALLERGIES:  - Monitored the patient's allergies to sulfur-containing products and noted history of allergic reactions to various medications.  - Prescribed Fosinopril due to previous tolerance and lack of allergic reactions, using a gradual dose increase approach to minimize potential side effects.         1. Routine medical exam  -    Discussed healthy diet, regular exercise, necessary labs, age appropriate cancer screening, and routine vaccinations.       2. Chronic kidney disease, stage 3a  Assessment & Plan:  Reports today that she did not have any issues with taking fosinopril in the past.  It was stopped due to cost when she was without insurance.  Restart fosinopril. Continue ACE-I/ARB, sodium restriction, and avoidance of nephrotoxic agents.     Orders:  -     CBC Without Differential; Future; Expected date: 05/28/2025  -     Comprehensive Metabolic Panel; Future; Expected date: 05/28/2025  -     Lipid Panel; Future; Expected date: 05/28/2025  -     Magnesium; Future; Expected date: 05/28/2025  -     Phosphorus; Future; Expected date: 05/28/2025  -     PTH, Intact; Future; Expected date: 05/28/2025  -     Protein/Creatinine Ratio, Urine; Future; Expected date: 05/28/2025  -     Microalbumin/Creatinine Ratio, Urine; Future; Expected date: 05/28/2025    3. White coat syndrome with diagnosis of hypertension  Overview:  In Digital HTN program with excellent control.     If persistent elevation melissa-operatively could consider using IV Labetalol PRN while monitoring her pulse.  If bradycardic, hydralazine can be used    Assessment & Plan:  I suspect her BP has been high for some time.  Will start a slow approach to getting her under control with fosinopril.  F/u in 2 months for further  titration of meds.     Orders:  -     TSH; Future; Expected date: 05/28/2025  -     fosinopriL (MONOPRIL) 10 MG Tab; Take 1 tablet (10 mg total) by mouth once daily for 30 days, THEN 2 tablets (20 mg total) once daily.  Dispense: 90 tablet; Refill: 0    4. Tortuous aorta  Overview:  Stable, asymptomatic chronic condition.  Will continue to maximize risk factor reduction and adjust medication as needed.       5. Prediabetes -   Recheck A1c  -     Hemoglobin A1C; Future; Expected date: 05/28/2025    6. Secondary hyperparathyroidism -   Stable, asymptomatic chronic condition.  Will continue to maximize risk factor reduction and adjust medication as needed.     7. Incomplete uterovaginal prolapse           Follow-up: Follow up in about 2 months (around 7/28/2025) for follow up for chronic conditions, with [facility].    This note was generated with the assistance of ambient listening technology. Verbal consent was obtained by the patient and accompanying visitor(s) for the recording of patient appointment to facilitate this note. I attest to having reviewed and edited the generated note for accuracy, though some syntax or spelling errors may persist. Please contact the author of this note for any clarification.      ______________________________________________________________________    Chief Complaint  Chief Complaint   Patient presents with    Annual Exam       History of Present Illness    CHIEF COMPLAINT:  Ms. Rascon presents today for a routine physical exam    HYPERTENSION:  She has a history of white coat hypertension with consistently elevated blood pressure readings in office settings. Since March of this year, she reports elevated home blood pressure measurements as high as 180/120, confirmed with multiple devices including a new and manual device. This represents a significant change from her previously well-controlled home readings. She previously used multiple antihypertensive medications including ACE inhibitor,  Losartan, and Fosinopril in 2010 which were well tolerated. She reports a concerning reaction to amlodipine in the past.    GENITOURINARY:  She currently uses a pessary for management of vaginal prolapse. She had surgical consultation in April/May 2022 for vaginal prolapse repair, but surgery was deferred due to elevated blood pressure. She plans to continue pessary use until November of this year.    WEIGHT:  She reports unintentional weight loss of approximately 20 lbs over an unspecified time period.    PAST MEDICAL HISTORY:  She has a history of kidney function concerns which led to discontinuation of Meloxicam.            The 10-year ASCVD risk score (Davy HALL, et al., 2019) is: 17.3%    Values used to calculate the score:      Age: 65 years      Sex: Female      Is Non- : No      Diabetic: No      Tobacco smoker: No      Systolic Blood Pressure: 196 mmHg      Is BP treated: Yes      HDL Cholesterol: 63 mg/dL      Total Cholesterol: 193 mg/dL      Computed KFRE 5-Year unavailable. One or more values for this score either were not found within the given timeframe or did not fit some other criterion.     ROS  Review of Systems   Constitutional:  Negative for activity change and unexpected weight change.   HENT:  Negative for hearing loss, rhinorrhea and trouble swallowing.    Eyes:  Negative for discharge and visual disturbance.   Respiratory:  Negative for chest tightness and wheezing.    Cardiovascular:  Negative for chest pain and palpitations.   Gastrointestinal:  Negative for blood in stool, constipation, diarrhea and vomiting.   Endocrine: Positive for polyuria. Negative for polydipsia.   Genitourinary:  Negative for difficulty urinating, dysuria, hematuria and menstrual problem.   Musculoskeletal:  Negative for arthralgias, joint swelling and neck pain.   Neurological:  Negative for weakness and headaches.   Psychiatric/Behavioral:  Negative for confusion and dysphoric mood.   "          Physical Exam  Vitals:    05/28/25 1344 05/28/25 1415   BP: (!) 202/108 (!) 196/110   BP Location: Right arm    Patient Position: Sitting    Pulse: 88    SpO2: 99%    Weight: 75.4 kg (166 lb 3.6 oz)    Height: 5' 4" (1.626 m)     Body mass index is 28.53 kg/m².  Weight: 75.4 kg (166 lb 3.6 oz)   Height: 5' 4" (162.6 cm)   Physical Exam  Constitutional:       General: She is not in acute distress.     Appearance: She is well-developed.   HENT:      Head: Normocephalic and atraumatic.   Eyes:      General: Lids are normal. No scleral icterus.     Conjunctiva/sclera: Conjunctivae normal.      Pupils: Pupils are equal, round, and reactive to light.   Neck:      Thyroid: No thyromegaly.      Vascular: No carotid bruit or JVD.   Cardiovascular:      Rate and Rhythm: Normal rate and regular rhythm.      Pulses: Normal pulses.      Heart sounds: Normal heart sounds. No murmur heard.     No friction rub. No S3 or S4 sounds.   Pulmonary:      Effort: Pulmonary effort is normal.      Breath sounds: Normal breath sounds. No wheezing, rhonchi or rales.   Abdominal:      General: Bowel sounds are normal.      Palpations: Abdomen is soft.      Tenderness: There is no guarding.   Musculoskeletal:      Cervical back: Full passive range of motion without pain and neck supple.      Right lower leg: No edema.      Left lower leg: No edema.   Skin:     General: Skin is warm and dry.      Findings: No rash.   Neurological:      Mental Status: She is alert and oriented to person, place, and time.   Psychiatric:         Speech: Speech normal.         Behavior: Behavior normal.         Thought Content: Thought content normal.               "

## 2025-05-29 ENCOUNTER — RESULTS FOLLOW-UP (OUTPATIENT)
Dept: INTERNAL MEDICINE | Facility: CLINIC | Age: 65
End: 2025-05-29

## 2025-05-29 ENCOUNTER — TELEPHONE (OUTPATIENT)
Dept: INTERNAL MEDICINE | Facility: CLINIC | Age: 65
End: 2025-05-29
Payer: MEDICARE

## 2025-05-29 ENCOUNTER — LAB VISIT (OUTPATIENT)
Dept: LAB | Facility: HOSPITAL | Age: 65
End: 2025-05-29
Attending: INTERNAL MEDICINE
Payer: MEDICARE

## 2025-05-29 DIAGNOSIS — I10 WHITE COAT SYNDROME WITH DIAGNOSIS OF HYPERTENSION: Chronic | ICD-10-CM

## 2025-05-29 DIAGNOSIS — R73.03 PREDIABETES: Chronic | ICD-10-CM

## 2025-05-29 DIAGNOSIS — N18.31 CHRONIC KIDNEY DISEASE, STAGE 3A: ICD-10-CM

## 2025-05-29 LAB
ALBUMIN SERPL BCP-MCNC: 4.1 G/DL (ref 3.5–5.2)
ALBUMIN/CREAT UR: 226.7 UG/MG
ALP SERPL-CCNC: 55 UNIT/L (ref 40–150)
ALT SERPL W/O P-5'-P-CCNC: 16 UNIT/L (ref 10–44)
ANION GAP (OHS): 8 MMOL/L (ref 8–16)
AST SERPL-CCNC: 17 UNIT/L (ref 11–45)
BILIRUB SERPL-MCNC: 0.5 MG/DL (ref 0.1–1)
BUN SERPL-MCNC: 15 MG/DL (ref 8–23)
CALCIUM SERPL-MCNC: 9.4 MG/DL (ref 8.7–10.5)
CHLORIDE SERPL-SCNC: 107 MMOL/L (ref 95–110)
CHOLEST SERPL-MCNC: 181 MG/DL (ref 120–199)
CHOLEST/HDLC SERPL: 2.7 {RATIO} (ref 2–5)
CO2 SERPL-SCNC: 27 MMOL/L (ref 23–29)
CREAT SERPL-MCNC: 1.2 MG/DL (ref 0.5–1.4)
CREAT UR-MCNC: 59 MG/DL (ref 15–325)
CREAT UR-MCNC: 60 MG/DL (ref 15–325)
EAG (OHS): 117 MG/DL (ref 68–131)
ERYTHROCYTE [DISTWIDTH] IN BLOOD BY AUTOMATED COUNT: 14.4 % (ref 11.5–14.5)
GFR SERPLBLD CREATININE-BSD FMLA CKD-EPI: 50 ML/MIN/1.73/M2
GLUCOSE SERPL-MCNC: 88 MG/DL (ref 70–110)
HBA1C MFR BLD: 5.7 % (ref 4–5.6)
HCT VFR BLD AUTO: 36.9 % (ref 37–48.5)
HDLC SERPL-MCNC: 66 MG/DL (ref 40–75)
HDLC SERPL: 36.5 % (ref 20–50)
HGB BLD-MCNC: 12.3 GM/DL (ref 12–16)
LDLC SERPL CALC-MCNC: 100.4 MG/DL (ref 63–159)
MAGNESIUM SERPL-MCNC: 1.9 MG/DL (ref 1.6–2.6)
MCH RBC QN AUTO: 26.2 PG (ref 27–31)
MCHC RBC AUTO-ENTMCNC: 33.3 G/DL (ref 32–36)
MCV RBC AUTO: 79 FL (ref 82–98)
MICROALBUMIN UR-MCNC: 136 UG/ML (ref ?–5000)
NONHDLC SERPL-MCNC: 115 MG/DL
PHOSPHATE SERPL-MCNC: 3 MG/DL (ref 2.7–4.5)
PLATELET # BLD AUTO: 197 K/UL (ref 150–450)
PMV BLD AUTO: 11 FL (ref 9.2–12.9)
POTASSIUM SERPL-SCNC: 4.3 MMOL/L (ref 3.5–5.1)
PROT SERPL-MCNC: 8.2 GM/DL (ref 6–8.4)
PROT UR-MCNC: 24 MG/DL
PROT/CREAT UR: 0.41 MG/G{CREAT}
PTH-INTACT SERPL-MCNC: 145.6 PG/ML (ref 9–77)
RBC # BLD AUTO: 4.7 M/UL (ref 4–5.4)
SODIUM SERPL-SCNC: 142 MMOL/L (ref 136–145)
TRIGL SERPL-MCNC: 73 MG/DL (ref 30–150)
TSH SERPL-ACNC: 1.18 UIU/ML (ref 0.4–4)
WBC # BLD AUTO: 4.38 K/UL (ref 3.9–12.7)

## 2025-05-29 PROCEDURE — 83036 HEMOGLOBIN GLYCOSYLATED A1C: CPT

## 2025-05-29 PROCEDURE — 85027 COMPLETE CBC AUTOMATED: CPT

## 2025-05-29 PROCEDURE — 83970 ASSAY OF PARATHORMONE: CPT

## 2025-05-29 PROCEDURE — 82465 ASSAY BLD/SERUM CHOLESTEROL: CPT

## 2025-05-29 PROCEDURE — 84156 ASSAY OF PROTEIN URINE: CPT

## 2025-05-29 PROCEDURE — 84443 ASSAY THYROID STIM HORMONE: CPT

## 2025-05-29 PROCEDURE — 83735 ASSAY OF MAGNESIUM: CPT

## 2025-05-29 PROCEDURE — 84100 ASSAY OF PHOSPHORUS: CPT

## 2025-05-29 PROCEDURE — 82570 ASSAY OF URINE CREATININE: CPT | Mod: 59

## 2025-05-29 PROCEDURE — 36415 COLL VENOUS BLD VENIPUNCTURE: CPT | Mod: PO

## 2025-05-29 PROCEDURE — 80053 COMPREHEN METABOLIC PANEL: CPT

## 2025-05-29 NOTE — TELEPHONE ENCOUNTER
Please let the patient know that her labs are overall stable. Was she able to start the Fosinopril? Has she checked her blood pressure?    We will plan to see her as scheduled with me end of July for follow-up.    Thanks!  Lorri HSU

## 2025-07-30 ENCOUNTER — PATIENT MESSAGE (OUTPATIENT)
Dept: RESEARCH | Facility: CLINIC | Age: 65
End: 2025-07-30
Payer: MEDICARE

## 2025-08-07 ENCOUNTER — PATIENT MESSAGE (OUTPATIENT)
Dept: RESEARCH | Facility: CLINIC | Age: 65
End: 2025-08-07
Payer: MEDICARE